# Patient Record
Sex: FEMALE | Race: WHITE | NOT HISPANIC OR LATINO | Employment: UNEMPLOYED | URBAN - METROPOLITAN AREA
[De-identification: names, ages, dates, MRNs, and addresses within clinical notes are randomized per-mention and may not be internally consistent; named-entity substitution may affect disease eponyms.]

---

## 2017-05-02 ENCOUNTER — INITIAL PRENATAL (OUTPATIENT)
Dept: OBSTETRICS AND GYNECOLOGY | Facility: CLINIC | Age: 22
End: 2017-05-02

## 2017-05-02 ENCOUNTER — LAB (OUTPATIENT)
Dept: LAB | Facility: HOSPITAL | Age: 22
End: 2017-05-02

## 2017-05-02 VITALS — WEIGHT: 143 LBS

## 2017-05-02 DIAGNOSIS — Z3A.01 LESS THAN 8 WEEKS GESTATION OF PREGNANCY: Primary | ICD-10-CM

## 2017-05-02 DIAGNOSIS — Z3A.01 LESS THAN 8 WEEKS GESTATION OF PREGNANCY: ICD-10-CM

## 2017-05-02 LAB
ABO GROUP BLD: NORMAL
BASOPHILS # BLD AUTO: 0.05 10*3/MM3 (ref 0–0.2)
BASOPHILS NFR BLD AUTO: 0.5 % (ref 0–1)
BLD GP AB SCN SERPL QL: NEGATIVE
DEPRECATED RDW RBC AUTO: 43.4 FL (ref 37–54)
EOSINOPHIL # BLD AUTO: 0.14 10*3/MM3 (ref 0.1–0.3)
EOSINOPHIL NFR BLD AUTO: 1.3 % (ref 0–3)
ERYTHROCYTE [DISTWIDTH] IN BLOOD BY AUTOMATED COUNT: 13 % (ref 11.3–14.5)
EXTERNAL HEPATITIS B SURFACE ANTIGEN: NEGATIVE
EXTERNAL RUBELLA QUALITATIVE: NORMAL
EXTERNAL SYPHILIS RPR SCREEN: NORMAL
HBV SURFACE AG SERPL QL IA: NORMAL
HCT VFR BLD AUTO: 38.1 % (ref 34.5–44)
HGB BLD-MCNC: 13.1 G/DL (ref 11.5–15.5)
HIV1 P24 AG SERPL QL IA: NEGATIVE
HIV1+2 AB SER QL: NORMAL
IMM GRANULOCYTES # BLD: 0.03 10*3/MM3 (ref 0–0.03)
IMM GRANULOCYTES NFR BLD: 0.3 % (ref 0–0.6)
LYMPHOCYTES # BLD AUTO: 2.23 10*3/MM3 (ref 0.6–4.8)
LYMPHOCYTES NFR BLD AUTO: 20.6 % (ref 24–44)
MCH RBC QN AUTO: 31.6 PG (ref 27–31)
MCHC RBC AUTO-ENTMCNC: 34.4 G/DL (ref 32–36)
MCV RBC AUTO: 92 FL (ref 80–99)
MONOCYTES # BLD AUTO: 0.61 10*3/MM3 (ref 0–1)
MONOCYTES NFR BLD AUTO: 5.6 % (ref 0–12)
NEUTROPHILS # BLD AUTO: 7.74 10*3/MM3 (ref 1.5–8.3)
NEUTROPHILS NFR BLD AUTO: 71.7 % (ref 41–71)
PLATELET # BLD AUTO: 358 10*3/MM3 (ref 150–450)
PMV BLD AUTO: 9.2 FL (ref 6–12)
RBC # BLD AUTO: 4.14 10*6/MM3 (ref 3.89–5.14)
RH BLD: POSITIVE
RUBV IGG SER QL: NORMAL
RUBV IGG SER-ACNC: 123.9 IU/ML
WBC NRBC COR # BLD: 10.8 10*3/MM3 (ref 4.5–13.5)

## 2017-05-02 PROCEDURE — 36415 COLL VENOUS BLD VENIPUNCTURE: CPT

## 2017-05-02 PROCEDURE — 99204 OFFICE O/P NEW MOD 45 MIN: CPT | Performed by: OBSTETRICS & GYNECOLOGY

## 2017-05-02 PROCEDURE — 80081 OBSTETRIC PANEL INC HIV TSTG: CPT | Performed by: OBSTETRICS & GYNECOLOGY

## 2017-05-02 RX ORDER — PROMETHAZINE HYDROCHLORIDE 25 MG/1
25 SUPPOSITORY RECTAL EVERY 6 HOURS PRN
Qty: 12 SUPPOSITORY | Refills: 1 | Status: SHIPPED | OUTPATIENT
Start: 2017-05-02 | End: 2017-10-23

## 2017-05-03 LAB — RPR SER QL: NORMAL

## 2017-06-16 ENCOUNTER — ROUTINE PRENATAL (OUTPATIENT)
Dept: OBSTETRICS AND GYNECOLOGY | Facility: CLINIC | Age: 22
End: 2017-06-16

## 2017-06-16 VITALS — DIASTOLIC BLOOD PRESSURE: 60 MMHG | SYSTOLIC BLOOD PRESSURE: 100 MMHG | WEIGHT: 143.4 LBS

## 2017-06-16 DIAGNOSIS — Z3A.13 13 WEEKS GESTATION OF PREGNANCY: Primary | ICD-10-CM

## 2017-06-16 PROCEDURE — 99213 OFFICE O/P EST LOW 20 MIN: CPT | Performed by: OBSTETRICS & GYNECOLOGY

## 2017-06-16 RX ORDER — VALACYCLOVIR HYDROCHLORIDE 1 G/1
TABLET, FILM COATED ORAL
Refills: 0 | COMMUNITY
Start: 2017-05-10 | End: 2022-05-25

## 2017-06-16 NOTE — PROGRESS NOTES
Chief Complaint   Patient presents with   • Routine Prenatal Visit     No complaints       HPI: Glen is a  currently at 13w2d who today reports the following:  Nausea - No; Vaginal bleeding -  No; Heartburn - No.    ROS:  GI: Constipation - No; Diarrhea - No    Neuro: Headache - No; Visual change - No      EXAM:  Vitals: See prenatal flowsheet   Abdomen: See prenatal flowsheet   Urine glucose/protein: See prenatal flowsheet   Pelvic: See prenatal flowsheet     Prenatal Labs  Lab Results   Component Value Date    HGB 13.1 2017    RUBELLAIGGIN Immune 2017    HEPBSAG Non-Reactive 2017    ABO AB 2017    RH Positive 2017    ABSCRN Negative 2017    [id number] Non-Reactive 2017       MDM:  Impression: 1. Supervision of pregnancy   Tests done today: 1. none   Topics discussed: 1. Continue with PNV's  2. Prenatal labs reviewed  3. none - she had no major complaints,questions or concerns   Tests next visit: none   Next visit: See prenatal flowsheet

## 2017-06-27 RX ORDER — AZITHROMYCIN 500 MG/1
1000 TABLET, FILM COATED ORAL ONCE
Qty: 2 TABLET | Refills: 0 | Status: SHIPPED | OUTPATIENT
Start: 2017-06-27 | End: 2017-06-27

## 2017-06-27 RX ORDER — ONDANSETRON 4 MG/1
4 TABLET, ORALLY DISINTEGRATING ORAL EVERY 6 HOURS PRN
Qty: 20 TABLET | Refills: 2 | Status: SHIPPED | OUTPATIENT
Start: 2017-06-27 | End: 2017-10-23

## 2017-07-21 ENCOUNTER — ROUTINE PRENATAL (OUTPATIENT)
Dept: OBSTETRICS AND GYNECOLOGY | Facility: CLINIC | Age: 22
End: 2017-07-21

## 2017-07-21 VITALS — SYSTOLIC BLOOD PRESSURE: 100 MMHG | DIASTOLIC BLOOD PRESSURE: 66 MMHG | WEIGHT: 144 LBS

## 2017-07-21 DIAGNOSIS — Z34.02 ENCOUNTER FOR SUPERVISION OF NORMAL FIRST PREGNANCY IN SECOND TRIMESTER: Primary | ICD-10-CM

## 2017-07-21 PROCEDURE — 99213 OFFICE O/P EST LOW 20 MIN: CPT | Performed by: OBSTETRICS & GYNECOLOGY

## 2017-07-21 RX ORDER — NITROFURANTOIN 25; 75 MG/1; MG/1
100 CAPSULE ORAL 2 TIMES DAILY
COMMUNITY
End: 2017-07-31

## 2017-07-21 NOTE — PROGRESS NOTES
Chief Complaint   Patient presents with   • Pregnancy Problem     Seen in the ER @ [facility] for cramping and body aches was diagnosed with Bladder infection       HPI: Glen is a  currently at 18w2d who today reports the following:  Contractions - No; Leaking - No; Vaginal bleeding -  No; Heartburn - No.    ROS:  GI: Nausea - No ; Constipation - No; Diarrhea - No    Neuro: Headache - No ; Visual change - No      EXAM:  Vitals: See prenatal flowsheet   Abdomen: See prenatal flowsheet   Urine glucose/protein: See prenatal flowsheet   Pelvic: See prenatal flowsheet     Lab Results   Component Value Date    ABO AB 2017    RH Positive 2017    ABSCRN Negative 2017       MDM:  Impression: 1. Supervision of pregnancy   Tests done today: 1. none   Topics discussed: 1. Continue with PNV's  2. Prenatal labs reviewed  3. UTI in pregnancy   Tests next visit: U/S for anatomic screening  Test of cure for chlamydia   Next visit: See prenatal flowsheet

## 2017-07-31 ENCOUNTER — ROUTINE PRENATAL (OUTPATIENT)
Dept: OBSTETRICS AND GYNECOLOGY | Facility: CLINIC | Age: 22
End: 2017-07-31

## 2017-07-31 VITALS — SYSTOLIC BLOOD PRESSURE: 98 MMHG | WEIGHT: 146.8 LBS | DIASTOLIC BLOOD PRESSURE: 68 MMHG

## 2017-07-31 DIAGNOSIS — O98.312 CHLAMYDIA TRACHOMATIS INFECTION DURING PREGNANCY IN SECOND TRIMESTER: Primary | ICD-10-CM

## 2017-07-31 DIAGNOSIS — B37.31 VAGINAL YEAST INFECTION: ICD-10-CM

## 2017-07-31 DIAGNOSIS — A56.8 CHLAMYDIA TRACHOMATIS INFECTION DURING PREGNANCY IN SECOND TRIMESTER: Primary | ICD-10-CM

## 2017-07-31 DIAGNOSIS — Z3A.19 19 WEEKS GESTATION OF PREGNANCY: ICD-10-CM

## 2017-07-31 LAB
EXTERNAL CHLAMYDIA SCREEN: NEGATIVE
EXTERNAL GONORRHEA SCREEN: NEGATIVE

## 2017-07-31 PROCEDURE — 99213 OFFICE O/P EST LOW 20 MIN: CPT | Performed by: OBSTETRICS & GYNECOLOGY

## 2017-07-31 RX ORDER — TERCONAZOLE 80 MG/1
80 SUPPOSITORY VAGINAL NIGHTLY
Qty: 3 SUPPOSITORY | Refills: 1 | Status: SHIPPED | OUTPATIENT
Start: 2017-07-31 | End: 2017-08-28

## 2017-07-31 NOTE — PROGRESS NOTES
Chief Complaint   Patient presents with   • Routine Prenatal Visit     ADEOLA: Chlamydia       HPI: Glen is a  currently at 19w5d who today reports the following:  Contractions - No; Leaking - No; Vaginal bleeding -  No; Heartburn - No.    ROS:  GI: Nausea - No ; Constipation - No; Diarrhea - No    Neuro: Headache - No ; Visual change - No      EXAM:  Vitals: See prenatal flowsheet   Abdomen: See prenatal flowsheet   Urine glucose/protein: See prenatal flowsheet   Pelvic: See prenatal flowsheet     Lab Results   Component Value Date    ABO AB 2017    RH Positive 2017    ABSCRN Negative 2017       MDM:  Impression: 1. Supervision of pregnancy   Tests done today: 1. GC/Chlamydia testing  2. U/S for anatomic screening - anatomy completely seen today   Topics discussed: 1. Continue with PNV's  2. Prenatal labs reviewed  3. none - she had no major complaints,questions or concerns   Tests next visit: none   Next visit: See prenatal flowsheet

## 2017-08-16 ENCOUNTER — TELEPHONE (OUTPATIENT)
Dept: OBSTETRICS AND GYNECOLOGY | Facility: CLINIC | Age: 22
End: 2017-08-16

## 2017-08-16 NOTE — TELEPHONE ENCOUNTER
----- Message from Ofelia Cano sent at 8/16/2017 12:40 PM EDT -----  Patient is needing something for a cold or sinius.  Left message for patient to try OTC meds that are on her OTC med list.

## 2017-08-28 ENCOUNTER — ROUTINE PRENATAL (OUTPATIENT)
Dept: OBSTETRICS AND GYNECOLOGY | Facility: CLINIC | Age: 22
End: 2017-08-28

## 2017-08-28 VITALS — WEIGHT: 151 LBS | DIASTOLIC BLOOD PRESSURE: 64 MMHG | SYSTOLIC BLOOD PRESSURE: 102 MMHG

## 2017-08-28 DIAGNOSIS — Z34.02 ENCOUNTER FOR SUPERVISION OF NORMAL FIRST PREGNANCY IN SECOND TRIMESTER: Primary | ICD-10-CM

## 2017-08-28 PROCEDURE — 99213 OFFICE O/P EST LOW 20 MIN: CPT | Performed by: OBSTETRICS & GYNECOLOGY

## 2017-08-28 NOTE — PROGRESS NOTES
Chief Complaint   Patient presents with   • Routine Prenatal Visit     No complaints       HPI: Glen is a  currently at 23w5d who today reports the following:  Contractions - No; Leaking - No; Vaginal bleeding -  No; Heartburn - No.    ROS:  GI: Nausea - No ; Constipation - No; Diarrhea - No    Neuro: Headache - No ; Visual change - No      EXAM:  Vitals: See prenatal flowsheet   Abdomen: See prenatal flowsheet   Urine glucose/protein: See prenatal flowsheet   Pelvic: See prenatal flowsheet     Lab Results   Component Value Date    ABO AB 2017    RH Positive 2017    ABSCRN Negative 2017       MDM:  Impression: 1. Supervision of pregnancy   Tests done today: 1. none   Topics discussed: 1. Continue with PNV's  2. Prenatal labs reviewed  3. none - she had no major complaints,questions or concerns   Tests next visit: none   Next visit: See prenatal flowsheet

## 2017-09-11 ENCOUNTER — ROUTINE PRENATAL (OUTPATIENT)
Dept: OBSTETRICS AND GYNECOLOGY | Facility: CLINIC | Age: 22
End: 2017-09-11

## 2017-09-11 VITALS — DIASTOLIC BLOOD PRESSURE: 68 MMHG | SYSTOLIC BLOOD PRESSURE: 110 MMHG | WEIGHT: 158 LBS

## 2017-09-11 DIAGNOSIS — Z34.02 ENCOUNTER FOR SUPERVISION OF NORMAL FIRST PREGNANCY IN SECOND TRIMESTER: Primary | ICD-10-CM

## 2017-09-11 PROCEDURE — 99213 OFFICE O/P EST LOW 20 MIN: CPT | Performed by: OBSTETRICS & GYNECOLOGY

## 2017-09-11 NOTE — PROGRESS NOTES
Chief Complaint   Patient presents with   • Routine Prenatal Visit     No complaints       HPI: Glen is a  currently at 25w5d who today reports the following:  Contractions - No; Leaking - No; Vaginal bleeding -  No; Heartburn - No.    ROS:  GI: Nausea - No ; Constipation - No; Diarrhea - No    Neuro: Headache - No ; Visual change - No      EXAM:  Vitals: See prenatal flowsheet   Abdomen: See prenatal flowsheet   Urine glucose/protein: See prenatal flowsheet   Pelvic: See prenatal flowsheet     Lab Results   Component Value Date    ABO AB 2017    RH Positive 2017    ABSCRN Negative 2017       MDM:  Impression: 1. Supervision of pregnancy   Tests done today: 1. none   Topics discussed: 1. Continue with PNV's  2. Prenatal labs reviewed  3. none - she had no major complaints,questions or concerns   Tests next visit: GCT  HgB  ABS   Next visit: See prenatal flowsheet

## 2017-09-25 ENCOUNTER — LAB (OUTPATIENT)
Dept: LAB | Facility: HOSPITAL | Age: 22
End: 2017-09-25

## 2017-09-25 ENCOUNTER — ROUTINE PRENATAL (OUTPATIENT)
Dept: OBSTETRICS AND GYNECOLOGY | Facility: CLINIC | Age: 22
End: 2017-09-25

## 2017-09-25 VITALS — WEIGHT: 162.2 LBS | SYSTOLIC BLOOD PRESSURE: 102 MMHG | DIASTOLIC BLOOD PRESSURE: 72 MMHG

## 2017-09-25 DIAGNOSIS — Z34.02 ENCOUNTER FOR SUPERVISION OF NORMAL FIRST PREGNANCY IN SECOND TRIMESTER: ICD-10-CM

## 2017-09-25 DIAGNOSIS — Z34.03 ENCOUNTER FOR SUPERVISION OF NORMAL FIRST PREGNANCY IN THIRD TRIMESTER: Primary | ICD-10-CM

## 2017-09-25 LAB
BLD GP AB SCN SERPL QL: NEGATIVE
EXTERNAL GTT 1 HOUR: 73
GLUCOSE 1H P 100 G GLC PO SERPL-MCNC: 73 MG/DL (ref 65–199)
GLUCOSE BLDC-MCNC: 75 MG/DL (ref 75–125)
HCT VFR BLD AUTO: 32.7 % (ref 34.5–44)
HGB BLD-MCNC: 11.2 G/DL (ref 11.5–15.5)
TSH SERPL DL<=0.05 MIU/L-ACNC: 1.47 MIU/ML (ref 0.35–5.35)

## 2017-09-25 PROCEDURE — 84443 ASSAY THYROID STIM HORMONE: CPT | Performed by: OBSTETRICS & GYNECOLOGY

## 2017-09-25 PROCEDURE — 36415 COLL VENOUS BLD VENIPUNCTURE: CPT

## 2017-09-25 PROCEDURE — 82962 GLUCOSE BLOOD TEST: CPT | Performed by: OBSTETRICS & GYNECOLOGY

## 2017-09-25 PROCEDURE — 85018 HEMOGLOBIN: CPT | Performed by: OBSTETRICS & GYNECOLOGY

## 2017-09-25 PROCEDURE — 85014 HEMATOCRIT: CPT | Performed by: OBSTETRICS & GYNECOLOGY

## 2017-09-25 PROCEDURE — 82950 GLUCOSE TEST: CPT | Performed by: OBSTETRICS & GYNECOLOGY

## 2017-09-25 PROCEDURE — 99213 OFFICE O/P EST LOW 20 MIN: CPT | Performed by: OBSTETRICS & GYNECOLOGY

## 2017-09-25 PROCEDURE — 86850 RBC ANTIBODY SCREEN: CPT | Performed by: OBSTETRICS & GYNECOLOGY

## 2017-09-25 NOTE — PROGRESS NOTES
Chief Complaint   Patient presents with   • Routine Prenatal Visit     No complaints       HPI: Glen is a  currently at 27w5d who today reports the following:  Contractions - No; Leaking - No; Vaginal bleeding -  No; Heartburn - No.    ROS:  GI: Nausea - No ; Constipation - No; Diarrhea - No    Neuro: Headache - No ; Visual change - No      EXAM:  Vitals: See prenatal flowsheet   Abdomen: See prenatal flowsheet   Urine glucose/protein: See prenatal flowsheet   Pelvic: See prenatal flowsheet     Lab Results   Component Value Date    ABO AB 2017    RH Positive 2017    ABSCRN Negative 2017       MDM:  Impression: 1. Supervision of low risk pregnancy   Tests done today: 1. antibody screen  2. GCT  3. HgB  4. TSH   Topics discussed: 1. Continue with PNV's  2. Prenatal labs reviewed  3. none - she had no major complaints,questions or concerns   Tests next visit: none   Next visit: See prenatal flowsheet

## 2017-10-09 ENCOUNTER — ROUTINE PRENATAL (OUTPATIENT)
Dept: OBSTETRICS AND GYNECOLOGY | Facility: CLINIC | Age: 22
End: 2017-10-09

## 2017-10-09 VITALS — SYSTOLIC BLOOD PRESSURE: 100 MMHG | DIASTOLIC BLOOD PRESSURE: 74 MMHG | WEIGHT: 154.6 LBS

## 2017-10-09 DIAGNOSIS — Z34.03 ENCOUNTER FOR SUPERVISION OF NORMAL FIRST PREGNANCY IN THIRD TRIMESTER: Primary | ICD-10-CM

## 2017-10-09 PROCEDURE — 99213 OFFICE O/P EST LOW 20 MIN: CPT | Performed by: OBSTETRICS & GYNECOLOGY

## 2017-10-14 ENCOUNTER — HOSPITAL ENCOUNTER (OUTPATIENT)
Facility: HOSPITAL | Age: 22
Setting detail: OBSERVATION
Discharge: HOME | End: 2017-10-14
Attending: OBSTETRICS & GYNECOLOGY | Admitting: OBSTETRICS & GYNECOLOGY

## 2017-10-14 VITALS
BODY MASS INDEX: 25.66 KG/M2 | HEART RATE: 88 BPM | HEIGHT: 65 IN | DIASTOLIC BLOOD PRESSURE: 68 MMHG | SYSTOLIC BLOOD PRESSURE: 113 MMHG | TEMPERATURE: 98.2 F | RESPIRATION RATE: 16 BRPM | WEIGHT: 154 LBS

## 2017-10-14 PROBLEM — Z34.90 PREGNANCY: Status: ACTIVE | Noted: 2017-10-14

## 2017-10-14 LAB
BACTERIA UR QL AUTO: ABNORMAL /HPF
BILIRUB UR QL STRIP: NEGATIVE
CLARITY UR: ABNORMAL
COLOR UR: YELLOW
DEPRECATED RDW RBC AUTO: 40.7 FL (ref 37–54)
ERYTHROCYTE [DISTWIDTH] IN BLOOD BY AUTOMATED COUNT: 12.1 % (ref 11.3–14.5)
GLUCOSE UR STRIP-MCNC: NEGATIVE MG/DL
HCT VFR BLD AUTO: 32.1 % (ref 34.5–44)
HGB BLD-MCNC: 11.5 G/DL (ref 11.5–15.5)
HGB UR QL STRIP.AUTO: NEGATIVE
HYALINE CASTS UR QL AUTO: ABNORMAL /LPF
KETONES UR QL STRIP: ABNORMAL
LEUKOCYTE ESTERASE UR QL STRIP.AUTO: ABNORMAL
MCH RBC QN AUTO: 32.9 PG (ref 27–31)
MCHC RBC AUTO-ENTMCNC: 35.8 G/DL (ref 32–36)
MCV RBC AUTO: 91.7 FL (ref 80–99)
MUCOUS THREADS URNS QL MICRO: ABNORMAL /HPF
NITRITE UR QL STRIP: NEGATIVE
PH UR STRIP.AUTO: 6.5 [PH] (ref 5–8)
PLATELET # BLD AUTO: 297 10*3/MM3 (ref 150–450)
PMV BLD AUTO: 9.5 FL (ref 6–12)
PROT UR QL STRIP: NEGATIVE
RBC # BLD AUTO: 3.5 10*6/MM3 (ref 3.89–5.14)
RBC # UR: ABNORMAL /HPF
REF LAB TEST METHOD: ABNORMAL
SP GR UR STRIP: 1.01 (ref 1–1.03)
SQUAMOUS #/AREA URNS HPF: ABNORMAL /HPF
UROBILINOGEN UR QL STRIP: ABNORMAL
WBC NRBC COR # BLD: 11.76 10*3/MM3 (ref 4.5–13.5)
WBC UR QL AUTO: ABNORMAL /HPF

## 2017-10-14 PROCEDURE — 85027 COMPLETE CBC AUTOMATED: CPT | Performed by: OBSTETRICS & GYNECOLOGY

## 2017-10-14 PROCEDURE — 99218 PR INITIAL OBSERVATION CARE/DAY 30 MINUTES: CPT | Performed by: OBSTETRICS & GYNECOLOGY

## 2017-10-14 PROCEDURE — 59025 FETAL NON-STRESS TEST: CPT | Performed by: OBSTETRICS & GYNECOLOGY

## 2017-10-14 PROCEDURE — 81001 URINALYSIS AUTO W/SCOPE: CPT | Performed by: OBSTETRICS & GYNECOLOGY

## 2017-10-14 PROCEDURE — G0378 HOSPITAL OBSERVATION PER HR: HCPCS

## 2017-10-14 PROCEDURE — 59025 FETAL NON-STRESS TEST: CPT

## 2017-10-14 PROCEDURE — 87086 URINE CULTURE/COLONY COUNT: CPT | Performed by: OBSTETRICS & GYNECOLOGY

## 2017-10-14 NOTE — PROGRESS NOTES
Demetria  Obstetric History and Physical    No chief complaint on file.      Subjective     Patient is a 22 y.o. female  currently at 30w3d, who presents with c/o dizziness.  She states had transient episode of dizziness while doing housework and  without associated shortness of breath or chest pain.  Denied leaking of fluid, vaginal bleeding, contractions, reports normal fetal activity.  Patient denies any other associated symptoms.  Patient's pre-and course has been uncomplicated with Dr. Romero.    none.  Her previous obstetric/gynecological history is noted for is remarkable for .    The following portions of the patients history were reviewed and updated as appropriate: current medications, allergies, past medical history, past surgical history, past family history, past social history and problem list .       Prenatal Information:   Maternal Prenatal Labs  Blood Type No results found for: ABO   Rh Status No results found for: RH   Antibody Screen No results found for: ABSCRN   Gonnorhea No results found for: GCCX   Chlamydia No results found for: CLAMYDCU   RPR No results found for: RPR   Syphilis Antibody No results found for: SYPHILIS   Rubella No results found for: RUBELLAIGGIN   Hepatitis B Surface Antigen No results found for: HEPBSAG   HIV-1 Antibody No results found for: LABHIV1   Hepatitis C Antibody No results found for: HEPCAB   Rapid Urin Drug Screen No results found for: AMPMETHU, BARBITSCNUR, LABBENZSCN, LABMETHSCN, LABOPIASCN, THCURSCR, COCAINEUR, AMPHETSCREEN, PROPOXSCN, BUPRENORSCNU, METAMPSCNUR, OXYCODONESCN, TRICYCLICSCN   Group B Strep Culture No results found for: GBSANTIGEN                 Past OB History:       Obstetric History       T0      L0     SAB0   TAB0   Ectopic0   Multiple0   Live Births0       # Outcome Date GA Lbr Kyle/2nd Weight Sex Delivery Anes PTL Lv   1 Current                   Past Medical History: No past medical history on file.   Past Surgical  History Past Surgical History:   Procedure Laterality Date   • APPENDECTOMY  2005      Family History: Family History   Problem Relation Age of Onset   • Hypertension Mother    • Hypertension Father    • Heart disease Maternal Grandmother    • Diabetes Maternal Grandmother    • Heart disease Maternal Grandfather    • Hypertension Paternal Grandmother    • Hypertension Paternal Grandfather       Social History:  reports that she has never smoked. She has never used smokeless tobacco.   reports that she does not drink alcohol.   reports that she does not use illicit drugs.                   General ROS Negative Findings:Headaches, Visual Changes, Epigastric pain, Anorexia, Nausia/Vomiting, ROM and Vaginal Bleeding    Review of Systems   Constitution: Positive for fever. Negative for decreased appetite.   Eyes: Negative for blurred vision.   Cardiovascular: Negative for dyspnea on exertion and leg swelling.   Genitourinary: Negative for dysuria.         Objective       Vital Signs Range for the last 24 hours  Temperature: Temp:  [98 °F (36.7 °C)-98.2 °F (36.8 °C)] 98.2 °F (36.8 °C)   Temp Source: Temp src: Oral   BP: BP: (110-113)/(66-68) 113/68   Pulse: Heart Rate:  [88-90] 88   Respirations: Resp:  [16] 16   SPO2:     O2 Amount (l/min):     O2 Devices     Weight: Weight:  [69.9 kg (154 lb)] 69.9 kg (154 lb)     Physical Examination:   General:   alert, appears stated age and cooperative   Skin:   normal   HEENT:     Lungs:   clear to auscultation bilaterally   Heart:   regular rate and rhythm, S1, S2 normal, no murmur, click, rub or gallop   Abdomen:  soft,Gravid uterus nontender, no guarding, rebound, negative CVA tenderness.     Lower Extremeties    no edema, no calf tenderness, DTRs 2+ over 4 no clonus    Pelvis:  Exam deferred.         Presentation:    Cervix: Exam by:     Dilation:     Effacement:     Station:         Fetal Heart Rate Assessment   Method: Fetal HR Assessment Method: external, other (see  comments) (poor tracing, US adjusted)   Beats/min: Fetal HR (Beats/Min): 150   Baseline: Fetal HR Baseline: normal range (110-160 bpm)   Varibility: Fetal HR Variability: moderate (amplitude range 6 to 25 bpm)   Accels: Fetal HR Accelerations: greater than/equal to 15 bpm, lasting at least 15 seconds   Decels: Fetal HR Decelerations: other (see comments) (poor tracing, US adjusted)   Tracing Category:     NST ind dizziness, reactive, mod v, onset 1340  Offset 1515  +accel 15x15, occ variable decel, no ctx  Uterine Assessment   Method: Method: TOCO (external toco transducer)   Frequency (min):     Ctx Count in 10 min:     Duration:     Intensity: Contraction Intensity: no contractions   Intensity by IUPC:     Resting Tone:     Resting Tone by IUPC:     Kasbeer Units:       Laboratory Results: @vnajgecs75@ UA dirty catch, CBC WNL  Radiology Review:@lastrad@  Other Studies:    Assessment/Plan     Active Problems:    Pregnancy        Assessment:  1.  Intrauterine pregnancy at 30w3d weeks gestation with reactive fetal status.    2.  Transient episode of dizziness  3.  Normal exam  4.  No evidence of labor or rupture membranes    Plan:  1. D/C to home.f/u ob routine or prn, instructions given  2. Plan of care has been reviewed with patient.  3.  Risks, benefits of treatment plan have been discussed.  4.  All questions have been answered.  5      Nick Beltre DO  10/14/2017  4:53 PM

## 2017-10-16 LAB — BACTERIA SPEC AEROBE CULT: NORMAL

## 2017-10-23 ENCOUNTER — ROUTINE PRENATAL (OUTPATIENT)
Dept: OBSTETRICS AND GYNECOLOGY | Facility: CLINIC | Age: 22
End: 2017-10-23

## 2017-10-23 VITALS — BODY MASS INDEX: 26.63 KG/M2 | DIASTOLIC BLOOD PRESSURE: 66 MMHG | WEIGHT: 160 LBS | SYSTOLIC BLOOD PRESSURE: 100 MMHG

## 2017-10-23 DIAGNOSIS — Z34.03 ENCOUNTER FOR SUPERVISION OF NORMAL FIRST PREGNANCY IN THIRD TRIMESTER: Primary | ICD-10-CM

## 2017-10-23 PROCEDURE — 99213 OFFICE O/P EST LOW 20 MIN: CPT | Performed by: OBSTETRICS & GYNECOLOGY

## 2017-10-23 NOTE — PROGRESS NOTES
Chief Complaint   Patient presents with   • Routine Prenatal Visit     No complaints       HPI: Glen is a  currently at 31w5d who today reports the following:  Contractions - No; Leaking - No; Vaginal bleeding -  No; Swelling of extremities - No.    ROS:  GI: Nausea - No; Constipation - No; Diarrhea - No    Neuro: Headache - No; Visual change - No      EXAM:  Vitals: See prenatal flowsheet   Abdomen: See prenatal flowsheet   Urine glucose/protein: See prenatal flowsheet   Pelvic: See prenatal flowsheet   MDM:  Impression: 1. Supervision of low risk pregnancy   Tests done today: 1. none   Topics discussed: 1. Continue with PNV's  2. Prenatal labs reviewed  3. none - she had no major complaints,questions or concerns   Tests next visit: none   Next visit: See prenatal flowsheet

## 2017-11-06 ENCOUNTER — ROUTINE PRENATAL (OUTPATIENT)
Dept: OBSTETRICS AND GYNECOLOGY | Facility: CLINIC | Age: 22
End: 2017-11-06

## 2017-11-06 VITALS — BODY MASS INDEX: 26.56 KG/M2 | SYSTOLIC BLOOD PRESSURE: 118 MMHG | DIASTOLIC BLOOD PRESSURE: 74 MMHG | WEIGHT: 159.6 LBS

## 2017-11-06 DIAGNOSIS — Z34.03 ENCOUNTER FOR SUPERVISION OF NORMAL FIRST PREGNANCY IN THIRD TRIMESTER: Primary | ICD-10-CM

## 2017-11-06 PROCEDURE — 99213 OFFICE O/P EST LOW 20 MIN: CPT | Performed by: OBSTETRICS & GYNECOLOGY

## 2017-11-06 NOTE — PROGRESS NOTES
Chief Complaint   Patient presents with   • Routine Prenatal Visit     No complaints       HPI: Glen is a  currently at 33w5d who today reports the following:  Contractions - No; Leaking - No; Vaginal bleeding -  No; Swelling of extremities - No.    ROS:  GI: Nausea - No; Constipation - No; Diarrhea - No    Neuro: Headache - No; Visual change - No      EXAM:  Vitals: See prenatal flowsheet   Abdomen: See prenatal flowsheet   Urine glucose/protein: See prenatal flowsheet   Pelvic: See prenatal flowsheet   MDM:  Impression: 1. Supervision of low risk pregnancy   Tests done today: 1. none   Topics discussed: 1. Continue with PNV's  2. Prenatal labs reviewed  3. none - she had no major complaints,questions or concerns   Tests next visit: GBS testing  U/S for EFW   Next visit: See prenatal flowsheet

## 2017-11-20 ENCOUNTER — ROUTINE PRENATAL (OUTPATIENT)
Dept: OBSTETRICS AND GYNECOLOGY | Facility: CLINIC | Age: 22
End: 2017-11-20

## 2017-11-20 VITALS — BODY MASS INDEX: 27.17 KG/M2 | SYSTOLIC BLOOD PRESSURE: 104 MMHG | DIASTOLIC BLOOD PRESSURE: 80 MMHG | WEIGHT: 163.3 LBS

## 2017-11-20 DIAGNOSIS — Z3A.35 35 WEEKS GESTATION OF PREGNANCY: Primary | ICD-10-CM

## 2017-11-20 LAB — EXTERNAL GROUP B STREP ANTIGEN: NORMAL

## 2017-11-20 PROCEDURE — 99213 OFFICE O/P EST LOW 20 MIN: CPT | Performed by: OBSTETRICS & GYNECOLOGY

## 2017-11-20 NOTE — PROGRESS NOTES
Chief Complaint   Patient presents with   • Routine Prenatal Visit     No complaints       HPI: Glen is a  currently at 35w5d who today reports the following:  Contractions - No; Leaking - No; Vaginal bleeding -  No; Swelling of extremities - No.    ROS:  GI: Nausea - No; Constipation - No; Diarrhea - No    Neuro: Headache - No; Visual change - No      EXAM:  Vitals: See prenatal flowsheet   Abdomen: See prenatal flowsheet   Urine glucose/protein: See prenatal flowsheet   Pelvic: See prenatal flowsheet   MDM:  Impression: 1. Supervision of low risk pregnancy   Tests done today: 1. GBS testing  2. U/S for efw   Topics discussed: 1. Continue with PNV's  2. Prenatal labs reviewed  3. none - she had no major complaints,questions or concerns   Tests next visit: none   Next visit: See prenatal flowsheet

## 2017-11-23 ENCOUNTER — HOSPITAL ENCOUNTER (OUTPATIENT)
Facility: HOSPITAL | Age: 22
Discharge: HOME | End: 2017-11-23
Attending: OBSTETRICS & GYNECOLOGY | Admitting: OBSTETRICS & GYNECOLOGY

## 2017-11-23 VITALS
HEART RATE: 114 BPM | TEMPERATURE: 97.5 F | RESPIRATION RATE: 16 BRPM | HEIGHT: 65 IN | WEIGHT: 163 LBS | SYSTOLIC BLOOD PRESSURE: 122 MMHG | BODY MASS INDEX: 27.16 KG/M2 | DIASTOLIC BLOOD PRESSURE: 75 MMHG

## 2017-11-23 PROCEDURE — 99214 OFFICE O/P EST MOD 30 MIN: CPT | Performed by: OBSTETRICS & GYNECOLOGY

## 2017-11-23 PROCEDURE — 59025 FETAL NON-STRESS TEST: CPT

## 2017-11-23 PROCEDURE — G0463 HOSPITAL OUTPT CLINIC VISIT: HCPCS

## 2017-11-23 NOTE — PROGRESS NOTES
"[facility]  Glen Bey  : 1995  MRN: [id number]  CSN: [id number]    History and Physical    Subjective   Glen Bey is a 22 y.o. year old  with an Estimated Date of Delivery: 17 currently at 36w1d presenting with leaking since last pm she had a gush of fluid and again this am - more and watery..    Nursing did nitrazine which was negative..        Prenatal care has been with Dr. Dr. Romero.  It has been benign.    Obstetric History       T0      L0     SAB0   TAB0   Ectopic0   Multiple0   Live Births0       # Outcome Date GA Lbr Kyle/2nd Weight Sex Delivery Anes PTL Lv   1 Current                 Past Medical History:   Diagnosis Date   • Chlamydia     treated for chlamydia in beginning of this pregnancy     Past Surgical History:   Procedure Laterality Date   • APPENDECTOMY       No current facility-administered medications for this encounter.     No Known Allergies  Smoking status: Never Smoker                                                              Smokeless status: Never Used                        Review of Systems      Objective   /75  Pulse 114  Temp 97.5 °F (36.4 °C) (Oral)   Resp 16  Ht 65\" (165.1 cm)  Wt 163 lb (73.9 kg)  LMP 2017 (Exact Date)  BMI 27.12 kg/m2  General: well developed; well nourished  no acute distress  appears stated age  not distressed   Abdomen: no umbilical or inginual hernias are present  no hepato-splenomegaly  gravid   FHT's: reactive and category 1  external monitors used  STERILE SPECULUM: negative valsalva; pH 4.0; ferning negative; slight inc amount of off white discharge   Cervix: was checked (by me): 2 cm / 50 % / -3   Presentation: cephalic   Contractions: none   Back: Not performed today     Prenatal Labs  Lab Results   Component Value Date    HGB 11.5 10/14/2017    HEPBSAG Non-Reactive 2017    ABO AB 2017    RH Positive 2017    ABSCRN Negative 2017    [id number] Non-Reactive 2017 "    URINECX No growth at 2 days 10/14/2017       Current Labs Reviewed   No data reviewed       Assessment   1. IUP at 36w1d  2. False labor  3. Negative sterile speculum exam       Plan   1. Keep scheduled follow-up with Dr. Dr. Romero as previously scheduled.  2. Return if contractions are painful and every 5-7 minutes for one hour OR ruptured membranes.      Craig Howard MD  11/23/2017  11:50 AM

## 2017-11-23 NOTE — NURSING NOTE
D/C instructions provided to pt on when to return to hospital such as vaginal bleeding more than a period (informed pt she may have spotting after SVE), contractions, decreased fetal movement, or if she thinks she is leaking fluid. Keep already scheduled appt. Patient has no other questions.

## 2017-11-27 ENCOUNTER — ROUTINE PRENATAL (OUTPATIENT)
Dept: OBSTETRICS AND GYNECOLOGY | Facility: CLINIC | Age: 22
End: 2017-11-27

## 2017-11-27 VITALS — DIASTOLIC BLOOD PRESSURE: 80 MMHG | SYSTOLIC BLOOD PRESSURE: 118 MMHG | BODY MASS INDEX: 27.49 KG/M2 | WEIGHT: 165.2 LBS

## 2017-11-27 DIAGNOSIS — Z34.03 ENCOUNTER FOR SUPERVISION OF NORMAL FIRST PREGNANCY IN THIRD TRIMESTER: Primary | ICD-10-CM

## 2017-11-27 PROCEDURE — 99213 OFFICE O/P EST LOW 20 MIN: CPT | Performed by: OBSTETRICS & GYNECOLOGY

## 2017-11-27 NOTE — PROGRESS NOTES
Chief Complaint   Patient presents with   • Routine Prenatal Visit     No complaints       HPI: Glen is a  currently at 36w5d who today reports the following:  Contractions - No; Leaking - No; Vaginal bleeding -  No; Swelling of extremities - No.    ROS:  GI: Nausea - No; Constipation - No; Diarrhea - No    Neuro: Headache - No; Visual change - No      EXAM:  Vitals: See prenatal flowsheet   Abdomen: See prenatal flowsheet   Urine glucose/protein: See prenatal flowsheet   Pelvic: See prenatal flowsheet   MDM:  Impression: 1. Supervision of low risk pregnancy   Tests done today: 1. none   Topics discussed: 1. Continue with PNV's  2. Prenatal labs reviewed  3. none - she had no major complaints,questions or concerns   Tests next visit: none   Next visit: See prenatal flowsheet

## 2017-12-01 ENCOUNTER — TELEPHONE (OUTPATIENT)
Dept: OBSTETRICS AND GYNECOLOGY | Facility: CLINIC | Age: 22
End: 2017-12-01

## 2017-12-01 NOTE — TELEPHONE ENCOUNTER
I talked with Robert regarding the patient's request.  She has an appointment on Monday, and Robert recommends that Glen discuss her situation with Dr. Romero at that time.  I attempted to contact Glen with this information.  No answer, I left a message asking her to return my call.

## 2017-12-01 NOTE — TELEPHONE ENCOUNTER
Patient called needing a letter stating she is pregnant, her due date, and also stating she is to be on maternity leave. 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 She says the standing all day makes her feel exhausted, and she cannot do it.    Fax letter to [phone]

## 2017-12-02 ENCOUNTER — HOSPITAL ENCOUNTER (OUTPATIENT)
Facility: HOSPITAL | Age: 22
Discharge: HOME | End: 2017-12-02
Attending: OBSTETRICS & GYNECOLOGY | Admitting: OBSTETRICS & GYNECOLOGY

## 2017-12-02 VITALS
WEIGHT: 163 LBS | SYSTOLIC BLOOD PRESSURE: 118 MMHG | RESPIRATION RATE: 16 BRPM | DIASTOLIC BLOOD PRESSURE: 73 MMHG | BODY MASS INDEX: 26.2 KG/M2 | HEART RATE: 108 BPM | TEMPERATURE: 97.9 F | HEIGHT: 66 IN

## 2017-12-02 LAB
ALBUMIN SERPL-MCNC: 3.9 G/DL (ref 3.2–4.8)
ALBUMIN/GLOB SERPL: 1.4 G/DL (ref 1.5–2.5)
ALP SERPL-CCNC: 148 U/L (ref 25–100)
ALT SERPL W P-5'-P-CCNC: 12 U/L (ref 7–40)
AMPHET+METHAMPHET UR QL: NEGATIVE
AMPHETAMINES UR QL: NEGATIVE
ANION GAP SERPL CALCULATED.3IONS-SCNC: 10 MMOL/L (ref 3–11)
AST SERPL-CCNC: 16 U/L (ref 0–33)
BARBITURATES UR QL SCN: NEGATIVE
BENZODIAZ UR QL SCN: NEGATIVE
BILIRUB SERPL-MCNC: 0.8 MG/DL (ref 0.3–1.2)
BILIRUB UR QL STRIP: ABNORMAL
BUN BLD-MCNC: 12 MG/DL (ref 9–23)
BUN/CREAT SERPL: 24 (ref 7–25)
BUPRENORPHINE SERPL-MCNC: NEGATIVE NG/ML
CALCIUM SPEC-SCNC: 9.2 MG/DL (ref 8.7–10.4)
CANNABINOIDS SERPL QL: NEGATIVE
CHLORIDE SERPL-SCNC: 103 MMOL/L (ref 99–109)
CLARITY UR: CLEAR
CO2 SERPL-SCNC: 19 MMOL/L (ref 20–31)
COCAINE UR QL: NEGATIVE
COLOR UR: YELLOW
CREAT BLD-MCNC: 0.5 MG/DL (ref 0.6–1.3)
DEPRECATED RDW RBC AUTO: 39.4 FL (ref 37–54)
ERYTHROCYTE [DISTWIDTH] IN BLOOD BY AUTOMATED COUNT: 12.3 % (ref 11.3–14.5)
GFR SERPL CREATININE-BSD FRML MDRD: >150 ML/MIN/1.73
GLOBULIN UR ELPH-MCNC: 2.7 GM/DL
GLUCOSE BLD-MCNC: 89 MG/DL (ref 70–100)
GLUCOSE UR STRIP-MCNC: NEGATIVE MG/DL
HCT VFR BLD AUTO: 34.2 % (ref 34.5–44)
HGB BLD-MCNC: 11.7 G/DL (ref 11.5–15.5)
HGB UR QL STRIP.AUTO: NEGATIVE
KETONES UR QL STRIP: ABNORMAL
LEUKOCYTE ESTERASE UR QL STRIP.AUTO: ABNORMAL
MCH RBC QN AUTO: 30.5 PG (ref 27–31)
MCHC RBC AUTO-ENTMCNC: 34.2 G/DL (ref 32–36)
MCV RBC AUTO: 89.1 FL (ref 80–99)
METHADONE UR QL SCN: NEGATIVE
NITRITE UR QL STRIP: NEGATIVE
OPIATES UR QL: NEGATIVE
OXYCODONE UR QL SCN: NEGATIVE
PCP UR QL SCN: NEGATIVE
PH UR STRIP.AUTO: 6 [PH] (ref 5–8)
PLATELET # BLD AUTO: 296 10*3/MM3 (ref 150–450)
PMV BLD AUTO: 10.3 FL (ref 6–12)
POTASSIUM BLD-SCNC: 3.6 MMOL/L (ref 3.5–5.5)
PROPOXYPH UR QL: NEGATIVE
PROT SERPL-MCNC: 6.6 G/DL (ref 5.7–8.2)
PROT UR QL STRIP: ABNORMAL
RBC # BLD AUTO: 3.84 10*6/MM3 (ref 3.89–5.14)
SODIUM BLD-SCNC: 132 MMOL/L (ref 132–146)
SP GR UR STRIP: 1.02 (ref 1–1.03)
TRICYCLICS UR QL SCN: NEGATIVE
UROBILINOGEN UR QL STRIP: ABNORMAL
WBC NRBC COR # BLD: 15.59 10*3/MM3 (ref 4.5–13.5)

## 2017-12-02 PROCEDURE — 99214 OFFICE O/P EST MOD 30 MIN: CPT | Performed by: OBSTETRICS & GYNECOLOGY

## 2017-12-02 PROCEDURE — G0463 HOSPITAL OUTPT CLINIC VISIT: HCPCS

## 2017-12-02 PROCEDURE — 59025 FETAL NON-STRESS TEST: CPT

## 2017-12-02 PROCEDURE — 96374 THER/PROPH/DIAG INJ IV PUSH: CPT

## 2017-12-02 PROCEDURE — 96361 HYDRATE IV INFUSION ADD-ON: CPT

## 2017-12-02 PROCEDURE — 80053 COMPREHEN METABOLIC PANEL: CPT | Performed by: OBSTETRICS & GYNECOLOGY

## 2017-12-02 PROCEDURE — 85027 COMPLETE CBC AUTOMATED: CPT | Performed by: OBSTETRICS & GYNECOLOGY

## 2017-12-02 PROCEDURE — 80306 DRUG TEST PRSMV INSTRMNT: CPT | Performed by: OBSTETRICS & GYNECOLOGY

## 2017-12-02 PROCEDURE — 81003 URINALYSIS AUTO W/O SCOPE: CPT | Performed by: OBSTETRICS & GYNECOLOGY

## 2017-12-02 PROCEDURE — 25010000002 ONDANSETRON PER 1 MG: Performed by: OBSTETRICS & GYNECOLOGY

## 2017-12-02 PROCEDURE — 87086 URINE CULTURE/COLONY COUNT: CPT | Performed by: OBSTETRICS & GYNECOLOGY

## 2017-12-02 RX ORDER — SODIUM CHLORIDE, SODIUM LACTATE, POTASSIUM CHLORIDE, CALCIUM CHLORIDE 600; 310; 30; 20 MG/100ML; MG/100ML; MG/100ML; MG/100ML
125 INJECTION, SOLUTION INTRAVENOUS CONTINUOUS
Status: DISCONTINUED | OUTPATIENT
Start: 2017-12-02 | End: 2017-12-02 | Stop reason: HOSPADM

## 2017-12-02 RX ORDER — DEXTROSE, SODIUM CHLORIDE, SODIUM LACTATE, POTASSIUM CHLORIDE, AND CALCIUM CHLORIDE 5; .6; .31; .03; .02 G/100ML; G/100ML; G/100ML; G/100ML; G/100ML
125 INJECTION, SOLUTION INTRAVENOUS CONTINUOUS
Status: DISCONTINUED | OUTPATIENT
Start: 2017-12-02 | End: 2017-12-02 | Stop reason: HOSPADM

## 2017-12-02 RX ORDER — SODIUM CHLORIDE 0.9 % (FLUSH) 0.9 %
1-10 SYRINGE (ML) INJECTION AS NEEDED
Status: DISCONTINUED | OUTPATIENT
Start: 2017-12-02 | End: 2017-12-02 | Stop reason: HOSPADM

## 2017-12-02 RX ORDER — ONDANSETRON 2 MG/ML
4 INJECTION INTRAMUSCULAR; INTRAVENOUS EVERY 6 HOURS PRN
Status: DISCONTINUED | OUTPATIENT
Start: 2017-12-02 | End: 2017-12-02 | Stop reason: HOSPADM

## 2017-12-02 RX ADMIN — SODIUM CHLORIDE, SODIUM LACTATE, POTASSIUM CHLORIDE, CALCIUM CHLORIDE AND DEXTROSE MONOHYDRATE 125 ML/HR: 5; 600; 310; 30; 20 INJECTION, SOLUTION INTRAVENOUS at 02:00

## 2017-12-02 RX ADMIN — SODIUM CHLORIDE, POTASSIUM CHLORIDE, SODIUM LACTATE AND CALCIUM CHLORIDE 500 ML: 600; 310; 30; 20 INJECTION, SOLUTION INTRAVENOUS at 01:36

## 2017-12-02 RX ADMIN — SODIUM CHLORIDE, POTASSIUM CHLORIDE, SODIUM LACTATE AND CALCIUM CHLORIDE 125 ML/HR: 600; 310; 30; 20 INJECTION, SOLUTION INTRAVENOUS at 03:31

## 2017-12-02 RX ADMIN — ONDANSETRON 4 MG: 2 INJECTION INTRAMUSCULAR; INTRAVENOUS at 01:40

## 2017-12-02 NOTE — DISCHARGE INSTR - DIET
No diet restrictions, but drink plenty of fluids. If you get sick again/are unable to keep anything down, call your physician.

## 2017-12-02 NOTE — H&P
[facility]  Obstetric History and Physical    No chief complaint on file.      Subjective     Patient is a 22 y.o. female  currently at 37w3d, who presents with contractions after having nausea, vomiting and diarrhoea all day.   She reports starting tho feel ill yesterday morning with vomiting and diarrhoea several times.   She was not able to tolerate much in the way of PO.   After several hours of this, she also started campbell.  She denies fever or chills.   She received IV hydration and antiemetics.  After 2 liters, she had improvement of all symptoms and felt well enough to go home.         The following portions of the patients history were reviewed and updated as appropriate: current medications, allergies, past medical history, past surgical history, past family history, past social history and problem list .       Prenatal Information:   Prenatal Labs  Lab Results   Component Value Date    RUBELLASCRN Immune 2017    HEPBSAG Non-Reactive 2017    ABO AB 2017    RH Positive 2017    ABSCRN Negative 2017    MKMZXCM1GY 73 2017    GBSANTIGEN NEG 2017         External Prenatal Results         Pregnancy Outside Results - these were transcribed from office records.  See scanned records for details. Date Time   Hgb      Hct      ABO      Rh      Antibody Screen      Glucose Fasting GTT      Glucose Tolerance Test 1 hour ^ 73  17    Glucose Tolerance Test 3 hour      Gonorrhea (discrete) ^ Negative  17    Chlamydia (discrete) ^ Negative  17    RPR ^ Non-Reactive  17    VDRL      Syphillis Antibody      Rubella ^ Immune  17    HBsAg ^ Negative  17    Herpes Simplex Virus PCR      Herpes Simplex VIrus Culture      HIV ^ Negative  17    Hep C RNA Quant PCR      Hep C Antibody      Urine Drug Screen      AFP      Group B Strep ^ NEG  17    GBS Susceptibility to Clindamycin      GBS Susceptibility to Eythromycin      Fetal  Fibronectin      Genetic Testing, Maternal Blood             Legend: ^: Historical            Past OB History:       Obstetric History       T0      L0     SAB0   TAB0   Ectopic0   Multiple0   Live Births0       # Outcome Date GA Lbr Kyle/2nd Weight Sex Delivery Anes PTL Lv   1 Current                   Past Medical History: Past Medical History:   Diagnosis Date   • Chlamydia     treated for chlamydia in beginning of this pregnancy      Past Surgical History Past Surgical History:   Procedure Laterality Date   • APPENDECTOMY        Family History: Family History   Problem Relation Age of Onset   • Hypertension Mother    • Hypertension Father    • Heart disease Maternal Grandmother    • Diabetes Maternal Grandmother    • Heart disease Maternal Grandfather    • Hypertension Paternal Grandmother    • Hypertension Paternal Grandfather       Social History:  reports that she has never smoked. She has never used smokeless tobacco.   reports that she does not drink alcohol.   reports that she does not use illicit drugs.        General ROS: Reviewed within EPIC.  All pertinent negative except as noted    Objective       Vital Signs Range for the last 24 hours  Temperature: Temp:  [97.9 °F (36.6 °C)] 97.9 °F (36.6 °C)   Temp Source: Temp src: Axillary   BP: BP: (118)/(73) 118/73   Pulse: Heart Rate:  [108] 108   Respirations: Resp:  [16] 16   SPO2:     O2 Amount (l/min):     O2 Devices     Weight: Weight:  [163 lb (73.9 kg)] 163 lb (73.9 kg)     Physical Examination: Alert and oriented X 3  Chest - clear to auscultation, no wheezes, rales or rhonchi, symmetric air entry  Heart - normal rate, S1, S2, no murmurs  Abdomen - no rebound tenderness noted  bowel sounds normal  Gravid uterus, No RUQ pain, No guarding  Extremities - Non-tender bilaterally        Cervix: Exam by: Method: sterile exam per RN   Dilation: Dilation: 2   Effacement: Cervical Effacement: 60%   Station: Station: -2       Fetal Heart Rate  Assessment   Method: Fetal HR Assessment Method: external   Beats/min: Fetal HR (Beats/Min): 150   Baseline: Fetal HR Baseline: normal range (110-160 bpm)   Varibility: Fetal HR Variability: moderate (amplitude range 6 to 25 bpm)   Accels: Fetal HR Accelerations: greater than/equal to 15 bpm   Decels: Fetal HR Decelerations: absent   Tracing Category:       Uterine Assessment   Method: Method: TOCO (external toco transducer)   Frequency (min): Contraction Frequency (min): 7-9   Ctx Count in 10 min: Contraction Frequency in 10min: 2   Duration: Contraction Duration (sec): 70-90   Intensity: Contraction Intensity:  (pt states she feels better, less painful contractions)   Intensity by IUPC: Contraction Intensity (mm Hg) by IUPC:     Resting Tone: Uterine Resting Tone: soft by palpation   Resting Tone by IUPC:     [place] Units:           Laboratory Results:  Lab Results   Component Value Date     12/02/2017    HGB 11.7 12/02/2017    HCT 34.2 (L) 12/02/2017    WBC 15.59 (H) 12/02/2017     Lab Results   Component Value Date     12/02/2017    K 3.6 12/02/2017     12/02/2017    CO2 19.0 (L) 12/02/2017    BUN 12 12/02/2017    CREATININE 0.50 (L) 12/02/2017    GLUCOSE 89 12/02/2017    ALBUMIN 3.90 12/02/2017    CALCIUM 9.2 12/02/2017    AST 16 12/02/2017    ALT 12 12/02/2017    BILITOT 0.8 12/02/2017     Lab Results   Component Value Date    SQUAMEPIUA 21-30 (A) 10/14/2017    SPECGRAVUR 1.025 12/02/2017    KETONESU >=80 mg/dL (3+) (A) 12/02/2017    BLOODU Negative 12/02/2017    LEUKOCYTESUR Small (1+) (A) 12/02/2017    NITRITEU Negative 12/02/2017    RBCUA 3-6 (A) 10/14/2017    WBCUA Too Numerous to Count (A) 10/14/2017    BACTERIA 3+ (A) 10/14/2017             Assessment:  1.  Intrauterine pregnancy at 37w3d weeks gestation with reassuring fetal status.    2.  False labour not ruptured  3.  Dehydration due to gastritis  Plan:  1. FHTs  Reactive NST  2. No cervical change or signs and symptoms of SROM  or labour  3. Reviewed labour guidelines  4. All questions have been answered  5. Discharge home with routine OB follow-up      Raul Omer MD  12/2/2017  5:31 AM

## 2017-12-04 ENCOUNTER — TELEPHONE (OUTPATIENT)
Dept: OBSTETRICS AND GYNECOLOGY | Facility: CLINIC | Age: 22
End: 2017-12-04

## 2017-12-04 ENCOUNTER — HOSPITAL ENCOUNTER (OUTPATIENT)
Facility: HOSPITAL | Age: 22
Setting detail: OBSERVATION
Discharge: HOME | End: 2017-12-04
Attending: OBSTETRICS & GYNECOLOGY | Admitting: OBSTETRICS & GYNECOLOGY

## 2017-12-04 ENCOUNTER — ROUTINE PRENATAL (OUTPATIENT)
Dept: OBSTETRICS AND GYNECOLOGY | Facility: CLINIC | Age: 22
End: 2017-12-04

## 2017-12-04 VITALS
BODY MASS INDEX: 26.52 KG/M2 | HEART RATE: 81 BPM | WEIGHT: 165 LBS | DIASTOLIC BLOOD PRESSURE: 74 MMHG | SYSTOLIC BLOOD PRESSURE: 104 MMHG | TEMPERATURE: 98.3 F | RESPIRATION RATE: 16 BRPM | HEIGHT: 66 IN

## 2017-12-04 VITALS — SYSTOLIC BLOOD PRESSURE: 106 MMHG | WEIGHT: 164.8 LBS | DIASTOLIC BLOOD PRESSURE: 78 MMHG | BODY MASS INDEX: 26.6 KG/M2

## 2017-12-04 DIAGNOSIS — Z34.03 ENCOUNTER FOR SUPERVISION OF NORMAL FIRST PREGNANCY IN THIRD TRIMESTER: Primary | ICD-10-CM

## 2017-12-04 LAB
BACTERIA SPEC AEROBE CULT: NORMAL
BACTERIA SPEC AEROBE CULT: NORMAL

## 2017-12-04 PROCEDURE — G0378 HOSPITAL OBSERVATION PER HR: HCPCS

## 2017-12-04 PROCEDURE — 59025 FETAL NON-STRESS TEST: CPT | Performed by: OBSTETRICS & GYNECOLOGY

## 2017-12-04 PROCEDURE — 59025 FETAL NON-STRESS TEST: CPT

## 2017-12-04 PROCEDURE — 99213 OFFICE O/P EST LOW 20 MIN: CPT | Performed by: OBSTETRICS & GYNECOLOGY

## 2017-12-04 NOTE — PROGRESS NOTES
Laborist    Patient sent from office  NST for fetal tachycardia   NST reactive  Mod V, 150s   Reactive, no decels, 15 x 15 accels onset   1600 offset 1737.  D/C to home, f/u ob routine or prn

## 2017-12-04 NOTE — PROGRESS NOTES
Chief Complaint   Patient presents with   • Routine Prenatal Visit     No complaints       HPI: Glen is a  currently at 37w5d who today reports the following:  Contractions - No; Leaking - No; Vaginal bleeding -  No; Swelling of extremities - No.    ROS:  GI: Nausea - No; Constipation - No; Diarrhea - No    Neuro: Headache - No; Visual change - No      EXAM:  Vitals: See prenatal flowsheet   Abdomen: See prenatal flowsheet   Urine glucose/protein: See prenatal flowsheet   Pelvic: See prenatal flowsheet   MDM:  Impression: 1. Supervision of low risk pregnancy   Tests done today: 1. none   Topics discussed: 1. Continue with PNV's  2. Prenatal labs reviewed  3. none - she had no major complaints,questions or concerns   Tests next visit: none   Next visit: See prenatal flowsheet

## 2017-12-04 NOTE — TELEPHONE ENCOUNTER
Dr Romero pt,  letter already written but it needs to be revised to say she has started maternity leave on November 23, she is not waiting until she delivers.   Fax to [phone]

## 2017-12-09 ENCOUNTER — ANESTHESIA EVENT (OUTPATIENT)
Dept: LABOR AND DELIVERY | Facility: HOSPITAL | Age: 22
End: 2017-12-09

## 2017-12-09 ENCOUNTER — HOSPITAL ENCOUNTER (INPATIENT)
Facility: HOSPITAL | Age: 22
LOS: 2 days | Discharge: HOME | End: 2017-12-11
Attending: OBSTETRICS & GYNECOLOGY | Admitting: OBSTETRICS & GYNECOLOGY

## 2017-12-09 ENCOUNTER — ANESTHESIA (OUTPATIENT)
Dept: LABOR AND DELIVERY | Facility: HOSPITAL | Age: 22
End: 2017-12-09

## 2017-12-09 DIAGNOSIS — Z37.9 NORMAL LABOR: ICD-10-CM

## 2017-12-09 DIAGNOSIS — Z3A.38 38 WEEKS GESTATION OF PREGNANCY: Primary | ICD-10-CM

## 2017-12-09 LAB
ABO GROUP BLD: NORMAL
BLD GP AB SCN SERPL QL: NEGATIVE
DEPRECATED RDW RBC AUTO: 40.1 FL (ref 37–54)
ERYTHROCYTE [DISTWIDTH] IN BLOOD BY AUTOMATED COUNT: 12.3 % (ref 11.3–14.5)
HCT VFR BLD AUTO: 32.7 % (ref 34.5–44)
HGB BLD-MCNC: 11.2 G/DL (ref 11.5–15.5)
MCH RBC QN AUTO: 30.6 PG (ref 27–31)
MCHC RBC AUTO-ENTMCNC: 34.3 G/DL (ref 32–36)
MCV RBC AUTO: 89.3 FL (ref 80–99)
PLATELET # BLD AUTO: 332 10*3/MM3 (ref 150–450)
PMV BLD AUTO: 10.2 FL (ref 6–12)
RBC # BLD AUTO: 3.66 10*6/MM3 (ref 3.89–5.14)
RH BLD: POSITIVE
WBC NRBC COR # BLD: 15.09 10*3/MM3 (ref 4.5–13.5)

## 2017-12-09 PROCEDURE — 25010000002 FENTANYL CITRATE (PF) 100 MCG/2ML SOLUTION: Performed by: ANESTHESIOLOGY

## 2017-12-09 PROCEDURE — 4A0HX4Z MEASUREMENT OF PRODUCTS OF CONCEPTION, CARDIAC ELECTRICAL ACTIVITY, EXTERNAL APPROACH: ICD-10-PCS | Performed by: OBSTETRICS & GYNECOLOGY

## 2017-12-09 PROCEDURE — C1755 CATHETER, INTRASPINAL: HCPCS

## 2017-12-09 PROCEDURE — C1755 CATHETER, INTRASPINAL: HCPCS | Performed by: ANESTHESIOLOGY

## 2017-12-09 PROCEDURE — 86901 BLOOD TYPING SEROLOGIC RH(D): CPT | Performed by: OBSTETRICS & GYNECOLOGY

## 2017-12-09 PROCEDURE — 59410 OBSTETRICAL CARE: CPT | Performed by: OBSTETRICS & GYNECOLOGY

## 2017-12-09 PROCEDURE — 59025 FETAL NON-STRESS TEST: CPT

## 2017-12-09 PROCEDURE — 86850 RBC ANTIBODY SCREEN: CPT | Performed by: OBSTETRICS & GYNECOLOGY

## 2017-12-09 PROCEDURE — 86900 BLOOD TYPING SEROLOGIC ABO: CPT | Performed by: OBSTETRICS & GYNECOLOGY

## 2017-12-09 PROCEDURE — 25010000002 ROPIVACAINE PER 1 MG: Performed by: ANESTHESIOLOGY

## 2017-12-09 PROCEDURE — 85027 COMPLETE CBC AUTOMATED: CPT | Performed by: OBSTETRICS & GYNECOLOGY

## 2017-12-09 PROCEDURE — 25010000002 BUTORPHANOL PER 1 MG: Performed by: OBSTETRICS & GYNECOLOGY

## 2017-12-09 RX ORDER — CARBOPROST TROMETHAMINE 250 UG/ML
250 INJECTION, SOLUTION INTRAMUSCULAR AS NEEDED
Status: DISCONTINUED | OUTPATIENT
Start: 2017-12-09 | End: 2017-12-09 | Stop reason: HOSPADM

## 2017-12-09 RX ORDER — PRENATAL VIT/IRON FUM/FOLIC AC 27MG-0.8MG
1 TABLET ORAL DAILY
Status: DISCONTINUED | OUTPATIENT
Start: 2017-12-09 | End: 2017-12-11 | Stop reason: HOSPADM

## 2017-12-09 RX ORDER — DIPHENHYDRAMINE HYDROCHLORIDE 50 MG/ML
12.5 INJECTION INTRAMUSCULAR; INTRAVENOUS EVERY 8 HOURS PRN
Status: DISCONTINUED | OUTPATIENT
Start: 2017-12-09 | End: 2017-12-09 | Stop reason: HOSPADM

## 2017-12-09 RX ORDER — BISACODYL 10 MG
10 SUPPOSITORY, RECTAL RECTAL DAILY PRN
Status: DISCONTINUED | OUTPATIENT
Start: 2017-12-10 | End: 2017-12-11 | Stop reason: HOSPADM

## 2017-12-09 RX ORDER — HYDROCODONE BITARTRATE AND ACETAMINOPHEN 5; 325 MG/1; MG/1
1 TABLET ORAL EVERY 4 HOURS PRN
Status: DISCONTINUED | OUTPATIENT
Start: 2017-12-09 | End: 2017-12-11 | Stop reason: HOSPADM

## 2017-12-09 RX ORDER — ONDANSETRON 2 MG/ML
4 INJECTION INTRAMUSCULAR; INTRAVENOUS ONCE AS NEEDED
Status: DISCONTINUED | OUTPATIENT
Start: 2017-12-09 | End: 2017-12-09 | Stop reason: HOSPADM

## 2017-12-09 RX ORDER — SODIUM CHLORIDE 0.9 % (FLUSH) 0.9 %
1-10 SYRINGE (ML) INJECTION AS NEEDED
Status: DISCONTINUED | OUTPATIENT
Start: 2017-12-09 | End: 2017-12-09 | Stop reason: HOSPADM

## 2017-12-09 RX ORDER — OXYTOCIN/RINGER'S LACTATE 20/1000 ML
125 PLASTIC BAG, INJECTION (ML) INTRAVENOUS CONTINUOUS PRN
Status: DISCONTINUED | OUTPATIENT
Start: 2017-12-09 | End: 2017-12-11 | Stop reason: HOSPADM

## 2017-12-09 RX ORDER — ZOLPIDEM TARTRATE 5 MG/1
5 TABLET ORAL NIGHTLY PRN
Status: DISCONTINUED | OUTPATIENT
Start: 2017-12-09 | End: 2017-12-11 | Stop reason: HOSPADM

## 2017-12-09 RX ORDER — LIDOCAINE HYDROCHLORIDE 10 MG/ML
5 INJECTION, SOLUTION EPIDURAL; INFILTRATION; INTRACAUDAL; PERINEURAL AS NEEDED
Status: DISCONTINUED | OUTPATIENT
Start: 2017-12-09 | End: 2017-12-09 | Stop reason: HOSPADM

## 2017-12-09 RX ORDER — EPHEDRINE SULFATE/0.9% NACL/PF 50 MG/10ML
10 SYRINGE (ML) INTRAVENOUS
Status: DISCONTINUED | OUTPATIENT
Start: 2017-12-09 | End: 2017-12-09 | Stop reason: HOSPADM

## 2017-12-09 RX ORDER — METOCLOPRAMIDE HYDROCHLORIDE 5 MG/ML
10 INJECTION INTRAMUSCULAR; INTRAVENOUS ONCE AS NEEDED
Status: DISCONTINUED | OUTPATIENT
Start: 2017-12-09 | End: 2017-12-09 | Stop reason: HOSPADM

## 2017-12-09 RX ORDER — LANOLIN 100 %
OINTMENT (GRAM) TOPICAL
Status: DISCONTINUED | OUTPATIENT
Start: 2017-12-09 | End: 2017-12-11 | Stop reason: HOSPADM

## 2017-12-09 RX ORDER — MAGNESIUM CARB/ALUMINUM HYDROX 105-160MG
30 TABLET,CHEWABLE ORAL ONCE
Status: DISCONTINUED | OUTPATIENT
Start: 2017-12-09 | End: 2017-12-09 | Stop reason: HOSPADM

## 2017-12-09 RX ORDER — ROPIVACAINE HYDROCHLORIDE 2 MG/ML
15 INJECTION, SOLUTION EPIDURAL; INFILTRATION; PERINEURAL CONTINUOUS
Status: DISCONTINUED | OUTPATIENT
Start: 2017-12-09 | End: 2017-12-09

## 2017-12-09 RX ORDER — SODIUM CHLORIDE 0.9 % (FLUSH) 0.9 %
1-10 SYRINGE (ML) INJECTION AS NEEDED
Status: DISCONTINUED | OUTPATIENT
Start: 2017-12-09 | End: 2017-12-11 | Stop reason: HOSPADM

## 2017-12-09 RX ORDER — ONDANSETRON 4 MG/1
4 TABLET, FILM COATED ORAL EVERY 6 HOURS PRN
Status: DISCONTINUED | OUTPATIENT
Start: 2017-12-09 | End: 2017-12-09 | Stop reason: HOSPADM

## 2017-12-09 RX ORDER — FAMOTIDINE 10 MG/ML
20 INJECTION, SOLUTION INTRAVENOUS ONCE AS NEEDED
Status: DISCONTINUED | OUTPATIENT
Start: 2017-12-09 | End: 2017-12-09 | Stop reason: HOSPADM

## 2017-12-09 RX ORDER — OXYTOCIN/RINGER'S LACTATE 20/1000 ML
125 PLASTIC BAG, INJECTION (ML) INTRAVENOUS CONTINUOUS PRN
Status: DISPENSED | OUTPATIENT
Start: 2017-12-09 | End: 2017-12-10

## 2017-12-09 RX ORDER — METHYLERGONOVINE MALEATE 0.2 MG/ML
200 INJECTION INTRAVENOUS ONCE AS NEEDED
Status: DISCONTINUED | OUTPATIENT
Start: 2017-12-09 | End: 2017-12-09 | Stop reason: HOSPADM

## 2017-12-09 RX ORDER — FENTANYL CITRATE 50 UG/ML
INJECTION, SOLUTION INTRAMUSCULAR; INTRAVENOUS AS NEEDED
Status: DISCONTINUED | OUTPATIENT
Start: 2017-12-09 | End: 2017-12-09 | Stop reason: SURG

## 2017-12-09 RX ORDER — OXYTOCIN/RINGER'S LACTATE 20/1000 ML
999 PLASTIC BAG, INJECTION (ML) INTRAVENOUS CONTINUOUS PRN
Status: DISCONTINUED | OUTPATIENT
Start: 2017-12-09 | End: 2017-12-11 | Stop reason: HOSPADM

## 2017-12-09 RX ORDER — ONDANSETRON 2 MG/ML
4 INJECTION INTRAMUSCULAR; INTRAVENOUS EVERY 6 HOURS PRN
Status: DISCONTINUED | OUTPATIENT
Start: 2017-12-09 | End: 2017-12-09 | Stop reason: HOSPADM

## 2017-12-09 RX ORDER — TRISODIUM CITRATE DIHYDRATE AND CITRIC ACID MONOHYDRATE 500; 334 MG/5ML; MG/5ML
30 SOLUTION ORAL ONCE
Status: DISCONTINUED | OUTPATIENT
Start: 2017-12-09 | End: 2017-12-09 | Stop reason: HOSPADM

## 2017-12-09 RX ORDER — DOCUSATE SODIUM 100 MG/1
100 CAPSULE, LIQUID FILLED ORAL 2 TIMES DAILY
Status: DISCONTINUED | OUTPATIENT
Start: 2017-12-09 | End: 2017-12-11 | Stop reason: HOSPADM

## 2017-12-09 RX ORDER — LIDOCAINE HYDROCHLORIDE AND EPINEPHRINE 20; 5 MG/ML; UG/ML
INJECTION, SOLUTION EPIDURAL; INFILTRATION; INTRACAUDAL; PERINEURAL AS NEEDED
Status: DISCONTINUED | OUTPATIENT
Start: 2017-12-09 | End: 2017-12-09 | Stop reason: SURG

## 2017-12-09 RX ORDER — OXYTOCIN/RINGER'S LACTATE 30/500 ML
2-24 PLASTIC BAG, INJECTION (ML) INTRAVENOUS
Status: DISCONTINUED | OUTPATIENT
Start: 2017-12-09 | End: 2017-12-09

## 2017-12-09 RX ORDER — OXYTOCIN/RINGER'S LACTATE 20/1000 ML
999 PLASTIC BAG, INJECTION (ML) INTRAVENOUS ONCE
Status: DISCONTINUED | OUTPATIENT
Start: 2017-12-09 | End: 2017-12-11 | Stop reason: HOSPADM

## 2017-12-09 RX ORDER — SODIUM CHLORIDE, SODIUM LACTATE, POTASSIUM CHLORIDE, CALCIUM CHLORIDE 600; 310; 30; 20 MG/100ML; MG/100ML; MG/100ML; MG/100ML
125 INJECTION, SOLUTION INTRAVENOUS CONTINUOUS
Status: DISCONTINUED | OUTPATIENT
Start: 2017-12-09 | End: 2017-12-09

## 2017-12-09 RX ORDER — ACETAMINOPHEN 325 MG/1
650 TABLET ORAL EVERY 4 HOURS PRN
Status: DISCONTINUED | OUTPATIENT
Start: 2017-12-09 | End: 2017-12-09 | Stop reason: HOSPADM

## 2017-12-09 RX ORDER — IBUPROFEN 600 MG/1
600 TABLET ORAL EVERY 6 HOURS PRN
Status: DISCONTINUED | OUTPATIENT
Start: 2017-12-09 | End: 2017-12-11 | Stop reason: HOSPADM

## 2017-12-09 RX ORDER — MISOPROSTOL 200 UG/1
800 TABLET ORAL AS NEEDED
Status: DISCONTINUED | OUTPATIENT
Start: 2017-12-09 | End: 2017-12-09 | Stop reason: HOSPADM

## 2017-12-09 RX ORDER — LIDOCAINE HYDROCHLORIDE AND EPINEPHRINE 15; 5 MG/ML; UG/ML
INJECTION, SOLUTION EPIDURAL AS NEEDED
Status: DISCONTINUED | OUTPATIENT
Start: 2017-12-09 | End: 2017-12-09 | Stop reason: SURG

## 2017-12-09 RX ADMIN — ROPIVACAINE HYDROCHLORIDE 15 ML/HR: 2 INJECTION, SOLUTION EPIDURAL; INFILTRATION at 09:49

## 2017-12-09 RX ADMIN — IBUPROFEN 600 MG: 600 TABLET, FILM COATED ORAL at 17:55

## 2017-12-09 RX ADMIN — SODIUM CHLORIDE, POTASSIUM CHLORIDE, SODIUM LACTATE AND CALCIUM CHLORIDE 125 ML/HR: 600; 310; 30; 20 INJECTION, SOLUTION INTRAVENOUS at 09:33

## 2017-12-09 RX ADMIN — FENTANYL CITRATE 100 MCG: 50 INJECTION, SOLUTION INTRAMUSCULAR; INTRAVENOUS at 09:48

## 2017-12-09 RX ADMIN — BUTORPHANOL TARTRATE 1 MG: 2 INJECTION, SOLUTION INTRAMUSCULAR; INTRAVENOUS at 05:09

## 2017-12-09 RX ADMIN — HYDROCODONE BITARTRATE AND ACETAMINOPHEN 1 TABLET: 5; 325 TABLET ORAL at 22:03

## 2017-12-09 RX ADMIN — LIDOCAINE HYDROCHLORIDE AND EPINEPHRINE 3 ML: 15; 5 INJECTION, SOLUTION EPIDURAL at 09:41

## 2017-12-09 RX ADMIN — LIDOCAINE HYDROCHLORIDE AND EPINEPHRINE 2 ML: 15; 5 INJECTION, SOLUTION EPIDURAL at 09:43

## 2017-12-09 RX ADMIN — LIDOCAINE HYDROCHLORIDE,EPINEPHRINE BITARTRATE 5 ML: 20; .005 INJECTION, SOLUTION EPIDURAL; INFILTRATION; INTRACAUDAL; PERINEURAL at 14:28

## 2017-12-09 RX ADMIN — ROPIVACAINE HYDROCHLORIDE 12 ML: 5 INJECTION, SOLUTION EPIDURAL; INFILTRATION; PERINEURAL at 09:45

## 2017-12-09 RX ADMIN — SODIUM CHLORIDE, POTASSIUM CHLORIDE, SODIUM LACTATE AND CALCIUM CHLORIDE 1000 ML/HR: 600; 310; 30; 20 INJECTION, SOLUTION INTRAVENOUS at 09:05

## 2017-12-09 RX ADMIN — SODIUM CHLORIDE, POTASSIUM CHLORIDE, SODIUM LACTATE AND CALCIUM CHLORIDE 125 ML/HR: 600; 310; 30; 20 INJECTION, SOLUTION INTRAVENOUS at 13:13

## 2017-12-09 RX ADMIN — OXYTOCIN 2 MILLI-UNITS/MIN: 10 INJECTION INTRAVENOUS at 13:45

## 2017-12-09 RX ADMIN — HYDROCODONE BITARTRATE AND ACETAMINOPHEN 1 TABLET: 5; 325 TABLET ORAL at 17:55

## 2017-12-09 RX ADMIN — SODIUM CHLORIDE, POTASSIUM CHLORIDE, SODIUM LACTATE AND CALCIUM CHLORIDE 125 ML/HR: 600; 310; 30; 20 INJECTION, SOLUTION INTRAVENOUS at 03:56

## 2017-12-09 NOTE — PLAN OF CARE
Problem: Patient Care Overview (Adult)  Goal: Plan of Care Review  Outcome: Ongoing (interventions implemented as appropriate)    12/09/17 1822   Coping/Psychosocial Response Interventions   Plan Of Care Reviewed With patient;significant other   Patient Care Overview   Progress progress toward functional goals as expected       Goal: Adult Individualization and Mutuality  Outcome: Ongoing (interventions implemented as appropriate)  Goal: Discharge Needs Assessment  Outcome: Ongoing (interventions implemented as appropriate)    Problem: Labor (Cervical Ripen, Induct, Augment) (Adult,Obstetrics,Pediatric)  Goal: Signs and Symptoms of Listed Potential Problems Will be Absent or Manageable (Labor)  Outcome: Outcome(s) achieved Date Met:  12/09/17

## 2017-12-09 NOTE — PROGRESS NOTES
Demetria Bey  : 1995  MRN: [id number]  CSN: [id number]    Labor progress note    Subjective   She reports pain is well contolled and comfortable with epidural     Objective   Min/max vitals past 24 hours:  Temp  Min: 97.7 °F (36.5 °C)  Max: 98.6 °F (37 °C)   BP  Min: 99/54  Max: 132/82   Pulse  Min: 72  Max: 116   Resp  Min: 16  Max: 18        FHT's: reassuring, reactive, variable decelerations are present and category 1   Cervix: was checked (by me): 7 cm / 90 % / 0   Contractions: irregular      Assessment   1. IUP at 38w3d  2. Progressing in labor  3. Fetal status reassuring     Plan   1. Expectant management  2. Augment with pitocin  3. IUPC placed  4. FSE placed  5. Recheck cervix in 2 hours    Maynor Romero MD  2017  2:00 PM

## 2017-12-09 NOTE — ANESTHESIA PREPROCEDURE EVALUATION
Anesthesia Evaluation     Patient summary reviewed and Nursing notes reviewed   NPO Solid Status: > 8 hours  NPO Liquid Status: > 8 hours     Airway   Mallampati: II  TM distance: >3 FB  Neck ROM: full  no difficulty expected  Dental      Pulmonary - negative pulmonary ROS   Cardiovascular - negative cardio ROS        Neuro/Psych- negative ROS  GI/Hepatic/Renal/Endo - negative ROS     Musculoskeletal (-) negative ROS    Abdominal    Substance History - negative use     OB/GYN    (+) Pregnant,         Other - negative ROS                                       Anesthesia Plan    ASA 2     epidural     Anesthetic plan and risks discussed with patient and spouse/significant other.

## 2017-12-09 NOTE — ANESTHESIA PROCEDURE NOTES
Labor Epidural    Patient location during procedure: OB  Performed By  Anesthesiologist: RAIN MUNOZ  Preanesthetic Checklist  Completed: patient identified, surgical consent, pre-op evaluation, timeout performed, IV checked, risks and benefits discussed and monitors and equipment checked  Prep:  Pt Position:sitting  Sterile Tech:cap, gloves, mask and sterile barrier  Prep:DuraPrep  Monitoring:blood pressure monitoring  Epidural Block Procedure:  Approach:midline  Guidance:palpation technique  Location:L3-L4  Needle Type:Tuohy  Needle Gauge:17 G  Loss of Resistance Medium: air  Loss of Resistance: 4cm  Cath Depth at skin:11 cm  Paresthesia: none  Aspiration:negative  Test Dose:negative  Number of Attempts: 1  Post Assessment:  Dressing:occlusive dressing applied and secured with tape  Pt Tolerance:patient tolerated the procedure well with no apparent complications  Complications:no

## 2017-12-10 LAB
BASOPHILS # BLD AUTO: 0.03 10*3/MM3 (ref 0–0.2)
BASOPHILS NFR BLD AUTO: 0.2 % (ref 0–1)
DEPRECATED RDW RBC AUTO: 41.9 FL (ref 37–54)
EOSINOPHIL # BLD AUTO: 0.17 10*3/MM3 (ref 0–0.3)
EOSINOPHIL NFR BLD AUTO: 1.1 % (ref 0–3)
ERYTHROCYTE [DISTWIDTH] IN BLOOD BY AUTOMATED COUNT: 12.7 % (ref 11.3–14.5)
HCT VFR BLD AUTO: 31.6 % (ref 34.5–44)
HGB BLD-MCNC: 10.6 G/DL (ref 11.5–15.5)
IMM GRANULOCYTES # BLD: 0.07 10*3/MM3 (ref 0–0.03)
IMM GRANULOCYTES NFR BLD: 0.5 % (ref 0–0.6)
LYMPHOCYTES # BLD AUTO: 2.91 10*3/MM3 (ref 0.6–4.8)
LYMPHOCYTES NFR BLD AUTO: 18.9 % (ref 24–44)
MCH RBC QN AUTO: 30.4 PG (ref 27–31)
MCHC RBC AUTO-ENTMCNC: 33.5 G/DL (ref 32–36)
MCV RBC AUTO: 90.5 FL (ref 80–99)
MONOCYTES # BLD AUTO: 0.68 10*3/MM3 (ref 0–1)
MONOCYTES NFR BLD AUTO: 4.4 % (ref 0–12)
NEUTROPHILS # BLD AUTO: 11.54 10*3/MM3 (ref 1.5–8.3)
NEUTROPHILS NFR BLD AUTO: 74.9 % (ref 41–71)
PLATELET # BLD AUTO: 291 10*3/MM3 (ref 150–450)
PMV BLD AUTO: 10.3 FL (ref 6–12)
RBC # BLD AUTO: 3.49 10*6/MM3 (ref 3.89–5.14)
WBC NRBC COR # BLD: 15.4 10*3/MM3 (ref 4.5–13.5)

## 2017-12-10 PROCEDURE — 85025 COMPLETE CBC W/AUTO DIFF WBC: CPT | Performed by: OBSTETRICS & GYNECOLOGY

## 2017-12-10 PROCEDURE — 99231 SBSQ HOSP IP/OBS SF/LOW 25: CPT | Performed by: OBSTETRICS & GYNECOLOGY

## 2017-12-10 RX ADMIN — IBUPROFEN 600 MG: 600 TABLET, FILM COATED ORAL at 19:50

## 2017-12-10 RX ADMIN — IBUPROFEN 600 MG: 600 TABLET, FILM COATED ORAL at 06:16

## 2017-12-10 NOTE — PROGRESS NOTES
12/10/2017  PPD #1    Subjective   Glen feels good.  Patient describes her lochia less than menses.  Pain is well controlled       Objective   Temp: Temp:  [97.6 °F (36.4 °C)-98.9 °F (37.2 °C)] 98.1 °F (36.7 °C) Temp src: Oral   BP: BP: ()/(52-96) 101/60        Pulse: Heart Rate:  [] 68  RR: Resp:  [14-18] 14    General:  No acute distress   Abdomen: Fundus firm and beneath umbilicus   Pelvis: deferred     Lab Results   Component Value Date    WBC 15.40 (H) 12/10/2017    HGB 10.6 (L) 12/10/2017    HCT 31.6 (L) 12/10/2017    MCV 90.5 12/10/2017     12/10/2017    HEPBSAG Non-Reactive 05/02/2017       Assessment  1. Stable after vaginal delivery.    Plan  1. Routine postpartum care.      This note has been electronically signed.    Craig Howard MD  December 10, 2017

## 2017-12-10 NOTE — PLAN OF CARE
Problem: Patient Care Overview (Adult)  Goal: Plan of Care Review  Outcome: Ongoing (interventions implemented as appropriate)    12/10/17 0429   Coping/Psychosocial Response Interventions   Plan Of Care Reviewed With patient   Patient Care Overview   Progress improving       Goal: Adult Individualization and Mutuality  Outcome: Ongoing (interventions implemented as appropriate)  Goal: Discharge Needs Assessment  Outcome: Ongoing (interventions implemented as appropriate)    Problem: Postpartum, Vaginal Delivery (Adult)  Goal: Signs and Symptoms of Listed Potential Problems Will be Absent or Manageable (Postpartum, Vaginal Delivery)  Outcome: Ongoing (interventions implemented as appropriate)

## 2017-12-11 VITALS
BODY MASS INDEX: 26.52 KG/M2 | WEIGHT: 165 LBS | HEIGHT: 66 IN | TEMPERATURE: 98.2 F | DIASTOLIC BLOOD PRESSURE: 77 MMHG | HEART RATE: 73 BPM | RESPIRATION RATE: 16 BRPM | SYSTOLIC BLOOD PRESSURE: 113 MMHG

## 2017-12-11 PROBLEM — Z3A.01 LESS THAN 8 WEEKS GESTATION OF PREGNANCY: Status: RESOLVED | Noted: 2017-05-02 | Resolved: 2017-12-11

## 2017-12-11 PROBLEM — A56.8 CHLAMYDIA TRACHOMATIS INFECTION DURING PREGNANCY IN SECOND TRIMESTER: Status: RESOLVED | Noted: 2017-07-31 | Resolved: 2017-12-11

## 2017-12-11 PROBLEM — Z37.9 NORMAL LABOR: Status: RESOLVED | Noted: 2017-12-09 | Resolved: 2017-12-11

## 2017-12-11 PROBLEM — O98.312 CHLAMYDIA TRACHOMATIS INFECTION DURING PREGNANCY IN SECOND TRIMESTER: Status: RESOLVED | Noted: 2017-07-31 | Resolved: 2017-12-11

## 2017-12-11 PROBLEM — Z34.90 PREGNANCY: Status: RESOLVED | Noted: 2017-10-14 | Resolved: 2017-12-11

## 2017-12-11 PROBLEM — Z34.03 ENCOUNTER FOR SUPERVISION OF NORMAL FIRST PREGNANCY IN THIRD TRIMESTER: Status: RESOLVED | Noted: 2017-07-21 | Resolved: 2017-12-11

## 2017-12-11 PROCEDURE — 99238 HOSP IP/OBS DSCHRG MGMT 30/<: CPT | Performed by: OBSTETRICS & GYNECOLOGY

## 2017-12-11 RX ORDER — IBUPROFEN 800 MG/1
800 TABLET ORAL EVERY 8 HOURS PRN
Qty: 90 TABLET | Refills: 3 | Status: SHIPPED | OUTPATIENT
Start: 2017-12-11 | End: 2020-01-31

## 2017-12-11 RX ORDER — PSEUDOEPHEDRINE HCL 30 MG
100 TABLET ORAL 2 TIMES DAILY
Qty: 60 CAPSULE | Refills: 3 | Status: SHIPPED | OUTPATIENT
Start: 2017-12-11 | End: 2020-01-31

## 2017-12-11 NOTE — DISCHARGE SUMMARY
Discharge Summary     Demetria Bey  : 1995  MRN: [id number]  CSN: [id number]    Date of Admission: 2017   Date of Discharge:  2017   Delivering Physician: Maynor Romero        Admission Diagnosis: 1. Normal labor [O80, Z37.9]   Discharge Diagnosis: 1. Same as above plus  2. Pregnancy at 38w3d - delivered       Procedures: 2017  - Vaginal, Spontaneous Delivery       Hospital Course  Patient is a 22 y.o.  who at 38w3d had a vaginal birth.  Her postpartum course was without complications.  On PPD #2 she was ready for discharge.  She had normal lochia and pain well controlled with oral medications.    Infant  female  fetus weighing 3280 g (7 lb 3.7 oz)   Apgars -  9  @ 1 minute /  9  @ 5 minutes.    Discharge labs  Lab Results   Component Value Date    WBC 15.40 (H) 12/10/2017    HGB 10.6 (L) 12/10/2017    HCT 31.6 (L) 12/10/2017     12/10/2017       Discharge Medications   Glen Bey   Home Medication Instructions JOHAN:[id number]    Printed on:17 1220   Medication Information                      benzocaine-lanolin-aloe vera (DERMOPLAST) 20-0.5 % aerosol topical spray  Apply  topically As Needed for Mild Pain  (perineal pain).             docusate sodium 100 MG capsule  Take 100 mg by mouth 2 (Two) Times a Day.             ibuprofen (ADVIL,MOTRIN) 800 MG tablet  Take 1 tablet by mouth Every 8 (Eight) Hours As Needed for Mild Pain , Moderate Pain  or Headache.             Prenatal Vit-Fe Fumarate-FA (PRENATAL VITAMIN PO)  Take  by mouth.             valACYclovir (VALTREX) 1000 MG tablet  TAKE 2 TABLETS BY MOUTH AS SOON AS SYMPOMS APPEAR TWICE A DAY                 Discharge Disposition Home or Self Care   Condition on Discharge: good   Follow-up: 6 weeks with Dr. Nick Romero MD  2017

## 2019-10-18 ENCOUNTER — INITIAL PRENATAL (OUTPATIENT)
Dept: OBSTETRICS AND GYNECOLOGY | Facility: CLINIC | Age: 24
End: 2019-10-18

## 2019-10-18 VITALS — SYSTOLIC BLOOD PRESSURE: 108 MMHG | BODY MASS INDEX: 22.61 KG/M2 | WEIGHT: 140 LBS | DIASTOLIC BLOOD PRESSURE: 68 MMHG

## 2019-10-18 DIAGNOSIS — Z36.87 UNSURE OF LMP (LAST MENSTRUAL PERIOD) AS REASON FOR ULTRASOUND SCAN: ICD-10-CM

## 2019-10-18 DIAGNOSIS — Z34.81 PRENATAL CARE, SUBSEQUENT PREGNANCY, FIRST TRIMESTER: Primary | ICD-10-CM

## 2019-10-18 PROCEDURE — 99214 OFFICE O/P EST MOD 30 MIN: CPT | Performed by: OBSTETRICS & GYNECOLOGY

## 2019-10-18 NOTE — PROGRESS NOTES
@SUBJECTIVEBEGIN  Chief Complaint   Patient presents with   • Initial Prenatal Visit     New OB. Dr. Romero Pt. no complaints       Glen Bey is a 24 y.o. year old .  No LMP recorded. Patient is pregnant..  She presents to be seen to initiate prenatal care.  She complains of breast tenderness, fatigue, nausea and positive home pregnancy test. These symptoms have been occurring for approximately 2 months.  She states that nothing helps to alleviate her symptoms.  Pre-existing conditions that could possibly affect the pregnancy are none.  Patient is unsure of her last menstrual period but is had 3+ home pregnancy test.  She presents today for initial OB work-up.  She is having minimal symptoms.  She needs an ultrasound to establish her EDC.    Past Medical History:   Diagnosis Date   • Chlamydia     treated for chlamydia in beginning of this pregnancy   , Past Surgical History:   Procedure Laterality Date   • APPENDECTOMY     , Family History   Problem Relation Age of Onset   • Hypertension Mother    • Hypertension Father    • Heart disease Maternal Grandmother    • Diabetes Maternal Grandmother    • Heart disease Maternal Grandfather    • Hypertension Paternal Grandmother    • Hypertension Paternal Grandfather    , Social History     Tobacco Use   • Smoking status: Never Smoker   • Smokeless tobacco: Never Used   Substance Use Topics   • Alcohol use: No   • Drug use: No   ,   (Not in a hospital admission) and Allergies:  Patient has no known allergies.    Social History    Tobacco Use      Smoking status: Never Smoker      Smokeless tobacco: Never Used      The following portions of the patient's history were reviewed and updated as appropriate:vital signs, allergies, current medications, past medical history, past social history, past surgical history and problem list.    Objective     Imaging   No data reviewed    Review of Systems - History obtained from the patient  General ROS:  negative  Psychological ROS: positive for - anxiety  ENT ROS: negative  Allergy and Immunology ROS: negative  Hematological and Lymphatic ROS: positive for - bruising  Endocrine ROS: negative  Breast ROS: negative for breast lumps  Respiratory ROS: no cough, shortness of breath, or wheezing  Cardiovascular ROS: no chest pain or dyspnea on exertion  Gastrointestinal ROS: positive for - nausea/vomiting  Genito-Urinary ROS: no dysuria, trouble voiding, or hematuria  Musculoskeletal ROS: negative  Neurological ROS: no TIA or stroke symptoms  Dermatological ROS: negative     Physical Exam:  See Episode    Assessment/Plan   ASSESSMENT  Glen was seen today for initial prenatal visit.    Diagnoses and all orders for this visit:    Prenatal care, subsequent pregnancy, first trimester  -     US Ob Transvaginal    Unsure of LMP (last menstrual period) as reason for ultrasound scan  -     US Ob Transvaginal        PLAN  1. Tests ordered today:  No orders of the defined types were placed in this encounter.    2. Medications prescribed today:  No orders of the defined types were placed in this encounter.    3. Information reviewed: exercise in pregnancy, nutrition in pregnancy, weight gain in pregnancy, work and travel restrictions during pregnancy, list of OTC medications acceptable in pregnancy and call coverage groups  4. Genetic testing reviewed: she will consider the information and make a decision at a later date.  5. The problem list for pregnancy was initiated today    Follow up: 1 week(s), ultrasound shows a gestational sac questionable fetal pole and no yolk sac.  This is possible missed .  Return in 1 week for repeat ultrasound.       This note was electronically signed.    Merrick Barry MD   2019

## 2019-10-25 ENCOUNTER — LAB (OUTPATIENT)
Dept: LAB | Facility: HOSPITAL | Age: 24
End: 2019-10-25

## 2019-10-25 ENCOUNTER — ROUTINE PRENATAL (OUTPATIENT)
Dept: OBSTETRICS AND GYNECOLOGY | Facility: CLINIC | Age: 24
End: 2019-10-25

## 2019-10-25 VITALS — WEIGHT: 141 LBS | SYSTOLIC BLOOD PRESSURE: 110 MMHG | DIASTOLIC BLOOD PRESSURE: 66 MMHG | BODY MASS INDEX: 22.77 KG/M2

## 2019-10-25 DIAGNOSIS — O21.9 NAUSEA AND VOMITING DURING PREGNANCY PRIOR TO 22 WEEKS GESTATION: ICD-10-CM

## 2019-10-25 DIAGNOSIS — O36.80X0 PREGNANCY WITH INCONCLUSIVE FETAL VIABILITY, SINGLE OR UNSPECIFIED FETUS: ICD-10-CM

## 2019-10-25 DIAGNOSIS — Z34.81 PRENATAL CARE, SUBSEQUENT PREGNANCY, FIRST TRIMESTER: Primary | ICD-10-CM

## 2019-10-25 DIAGNOSIS — Z36.87 UNSURE OF LMP (LAST MENSTRUAL PERIOD) AS REASON FOR ULTRASOUND SCAN: ICD-10-CM

## 2019-10-25 DIAGNOSIS — Z34.81 PRENATAL CARE, SUBSEQUENT PREGNANCY, FIRST TRIMESTER: ICD-10-CM

## 2019-10-25 LAB
ABO GROUP BLD: NORMAL
BASOPHILS # BLD AUTO: 0.09 10*3/MM3 (ref 0–0.2)
BASOPHILS NFR BLD AUTO: 1.1 % (ref 0–1.5)
BLD GP AB SCN SERPL QL: NEGATIVE
DEPRECATED RDW RBC AUTO: 39.6 FL (ref 37–54)
EOSINOPHIL # BLD AUTO: 0.29 10*3/MM3 (ref 0–0.4)
EOSINOPHIL NFR BLD AUTO: 3.5 % (ref 0.3–6.2)
ERYTHROCYTE [DISTWIDTH] IN BLOOD BY AUTOMATED COUNT: 12 % (ref 12.3–15.4)
HBA1C MFR BLD: 4.88 % (ref 4.8–5.6)
HBV SURFACE AG SERPL QL IA: NORMAL
HCG INTACT+B SERPL-ACNC: NORMAL MIU/ML
HCT VFR BLD AUTO: 37.8 % (ref 34–46.6)
HCV AB SER DONR QL: NORMAL
HGB BLD-MCNC: 13.1 G/DL (ref 12–15.9)
HIV1+2 AB SER QL: NORMAL
IMM GRANULOCYTES # BLD AUTO: 0.03 10*3/MM3 (ref 0–0.05)
IMM GRANULOCYTES NFR BLD AUTO: 0.4 % (ref 0–0.5)
LYMPHOCYTES # BLD AUTO: 2.24 10*3/MM3 (ref 0.7–3.1)
LYMPHOCYTES NFR BLD AUTO: 26.9 % (ref 19.6–45.3)
MCH RBC QN AUTO: 31.9 PG (ref 26.6–33)
MCHC RBC AUTO-ENTMCNC: 34.7 G/DL (ref 31.5–35.7)
MCV RBC AUTO: 92 FL (ref 79–97)
MONOCYTES # BLD AUTO: 0.43 10*3/MM3 (ref 0.1–0.9)
MONOCYTES NFR BLD AUTO: 5.2 % (ref 5–12)
NEUTROPHILS # BLD AUTO: 5.26 10*3/MM3 (ref 1.7–7)
NEUTROPHILS NFR BLD AUTO: 62.9 % (ref 42.7–76)
NRBC BLD AUTO-RTO: 0 /100 WBC (ref 0–0.2)
PLATELET # BLD AUTO: 341 10*3/MM3 (ref 140–450)
PMV BLD AUTO: 10.6 FL (ref 6–12)
PROGEST SERPL-MCNC: 14.2 NG/ML
RBC # BLD AUTO: 4.11 10*6/MM3 (ref 3.77–5.28)
RH BLD: POSITIVE
TSH SERPL DL<=0.05 MIU/L-ACNC: 1.85 UIU/ML (ref 0.27–4.2)
WBC NRBC COR # BLD: 8.34 10*3/MM3 (ref 3.4–10.8)

## 2019-10-25 PROCEDURE — 80081 OBSTETRIC PANEL INC HIV TSTG: CPT

## 2019-10-25 PROCEDURE — 86803 HEPATITIS C AB TEST: CPT

## 2019-10-25 PROCEDURE — 99213 OFFICE O/P EST LOW 20 MIN: CPT | Performed by: OBSTETRICS & GYNECOLOGY

## 2019-10-25 PROCEDURE — 83036 HEMOGLOBIN GLYCOSYLATED A1C: CPT

## 2019-10-25 PROCEDURE — 36415 COLL VENOUS BLD VENIPUNCTURE: CPT

## 2019-10-25 PROCEDURE — 84702 CHORIONIC GONADOTROPIN TEST: CPT | Performed by: OBSTETRICS & GYNECOLOGY

## 2019-10-25 PROCEDURE — 84144 ASSAY OF PROGESTERONE: CPT | Performed by: OBSTETRICS & GYNECOLOGY

## 2019-10-25 PROCEDURE — 84443 ASSAY THYROID STIM HORMONE: CPT

## 2019-10-25 RX ORDER — PROMETHAZINE HYDROCHLORIDE 25 MG/1
25 TABLET ORAL EVERY 6 HOURS PRN
Qty: 30 TABLET | Refills: 3 | Status: SHIPPED | OUTPATIENT
Start: 2019-10-25 | End: 2020-05-01

## 2019-10-25 NOTE — PROGRESS NOTES
No results found for: ABORH, LABANTI, ABID    Chief Complaint   Patient presents with   • Routine Prenatal Visit     ob visit with follow up ultrasound    • Morning Sickness     increased nausea and vomiting       HPI: Glen is a  currently at Unknown who today reports the following: Nausea-  YES; Contractions: No,   Vaginal bleeding- No; Heartburn- No    Today Glen complains of nausea with vomiting daily  See ob flowsheet for physical exam.    Review of Systems - Negative except for vomiting    Prenatal Vitals  BP: 110/66  Weight: 64 kg (141 lb)  Urine Glucose/Protein  Urine Glucose: Negative  Urine Protein: Negative  Vaginal Drainage  Draining Fluid: No  Edema  LLE Edema: None  RLE Edema: None  Facial Edema: None     Tests done today: U/S for dating  At the time of the next visit, she will need to have none    Impression:   Encounter Diagnoses   Name Primary?   • Prenatal care, subsequent pregnancy, first trimester Yes   • Unsure of LMP (last menstrual period) as reason for ultrasound scan    • Nausea and vomiting during pregnancy prior to 22 weeks gestation    • Pregnancy with inconclusive fetal viability, single or unspecified fetus        Plan: Return in 1 weeks,    This note was electronically signed.    Merrick Barry MD

## 2019-10-26 LAB
RPR SER QL: NORMAL
RUBV IGG SERPL IA-ACNC: POSITIVE

## 2019-11-01 ENCOUNTER — ROUTINE PRENATAL (OUTPATIENT)
Dept: OBSTETRICS AND GYNECOLOGY | Facility: CLINIC | Age: 24
End: 2019-11-01

## 2019-11-01 VITALS — SYSTOLIC BLOOD PRESSURE: 112 MMHG | BODY MASS INDEX: 21.96 KG/M2 | WEIGHT: 136 LBS | DIASTOLIC BLOOD PRESSURE: 72 MMHG

## 2019-11-01 DIAGNOSIS — Z34.81 PRENATAL CARE, SUBSEQUENT PREGNANCY, FIRST TRIMESTER: Primary | ICD-10-CM

## 2019-11-01 DIAGNOSIS — O21.9 NAUSEA AND VOMITING DURING PREGNANCY PRIOR TO 22 WEEKS GESTATION: ICD-10-CM

## 2019-11-01 PROCEDURE — 99212 OFFICE O/P EST SF 10 MIN: CPT | Performed by: OBSTETRICS & GYNECOLOGY

## 2019-11-15 ENCOUNTER — ROUTINE PRENATAL (OUTPATIENT)
Dept: OBSTETRICS AND GYNECOLOGY | Facility: CLINIC | Age: 24
End: 2019-11-15

## 2019-11-15 VITALS — SYSTOLIC BLOOD PRESSURE: 104 MMHG | BODY MASS INDEX: 23.03 KG/M2 | WEIGHT: 142.6 LBS | DIASTOLIC BLOOD PRESSURE: 64 MMHG

## 2019-11-15 DIAGNOSIS — Z34.81 PRENATAL CARE, SUBSEQUENT PREGNANCY, FIRST TRIMESTER: Primary | ICD-10-CM

## 2019-11-15 DIAGNOSIS — O21.9 NAUSEA AND VOMITING DURING PREGNANCY PRIOR TO 22 WEEKS GESTATION: ICD-10-CM

## 2019-11-15 PROCEDURE — 99212 OFFICE O/P EST SF 10 MIN: CPT | Performed by: OBSTETRICS & GYNECOLOGY

## 2019-11-15 NOTE — PROGRESS NOTES
No results found for: ABORH, LABANTI, ABID    Chief Complaint   Patient presents with   • Routine Prenatal Visit     No complaints       HPI: Glen is a  currently at 8w4d who today reports the following: Nausea-  No; Contractions: No,   Vaginal bleeding- No; Heartburn- No    Today Glen has no specific complaints  See ob flowsheet for physical exam.    Review of Systems - Negative     Prenatal Assessment  Fetal Heart Rate: 138  Prenatal Vitals  BP: 104/64  Weight: 64.7 kg (142 lb 9.6 oz)  Urine Glucose/Protein  Urine Glucose: Negative  Urine Protein: Negative  Vaginal Drainage  Draining Fluid: No  Edema  LLE Edema: None  RLE Edema: None  Facial Edema: None     Tests done today: none  At the time of the next visit, she will need to have none    Impression:   Encounter Diagnoses   Name Primary?   • Prenatal care, subsequent pregnancy, first trimester Yes   • Nausea and vomiting during pregnancy prior to 22 weeks gestation        Plan: Return in 3 weeks    This note was electronically signed.    Merrick Barry MD

## 2019-12-06 ENCOUNTER — LAB REQUISITION (OUTPATIENT)
Dept: LAB | Facility: HOSPITAL | Age: 24
End: 2019-12-06

## 2019-12-06 ENCOUNTER — ROUTINE PRENATAL (OUTPATIENT)
Dept: OBSTETRICS AND GYNECOLOGY | Facility: CLINIC | Age: 24
End: 2019-12-06

## 2019-12-06 VITALS — BODY MASS INDEX: 23 KG/M2 | DIASTOLIC BLOOD PRESSURE: 66 MMHG | WEIGHT: 142.4 LBS | SYSTOLIC BLOOD PRESSURE: 104 MMHG

## 2019-12-06 DIAGNOSIS — Z34.81 PRENATAL CARE, SUBSEQUENT PREGNANCY, FIRST TRIMESTER: Primary | ICD-10-CM

## 2019-12-06 DIAGNOSIS — Z00.00 ROUTINE GENERAL MEDICAL EXAMINATION AT A HEALTH CARE FACILITY: ICD-10-CM

## 2019-12-06 LAB — EXTERNAL NIPT: NORMAL

## 2019-12-06 PROCEDURE — 36415 COLL VENOUS BLD VENIPUNCTURE: CPT

## 2019-12-06 PROCEDURE — 99212 OFFICE O/P EST SF 10 MIN: CPT | Performed by: OBSTETRICS & GYNECOLOGY

## 2019-12-06 NOTE — PROGRESS NOTES
No results found for: ABORH, LABANTI, ABID    Chief Complaint   Patient presents with   • Routine Prenatal Visit     No complaints       HPI: Glen is a  currently at 11w4d who today reports the following: Nausea-  YES; Contractions: No,   Vaginal bleeding- No; Heartburn- No    Today Glen complains of nausea without vomiting   See ob flowsheet for physical exam.    Review of Systems - Negative except for present illness    Prenatal Assessment  Fetal Heart Rate: 162  Prenatal Vitals  BP: 104/66  Weight: 64.6 kg (142 lb 6.4 oz)  Vaginal Drainage  Draining Fluid: No  Edema  LLE Edema: None  RLE Edema: None  Facial Edema: None     Tests done today: cell free DNA  At the time of the next visit, she will need to have none    Impression:   Encounter Diagnoses   Name Primary?   • Prenatal care, subsequent pregnancy, first trimester Yes       Plan: Return in 4 weeks    This note was electronically signed.    Merrick Barry MD

## 2020-01-02 ENCOUNTER — TELEPHONE (OUTPATIENT)
Dept: OBSTETRICS AND GYNECOLOGY | Facility: CLINIC | Age: 25
End: 2020-01-02

## 2020-01-02 NOTE — TELEPHONE ENCOUNTER
Patient returned to telephone call and was told the results of the panorama testing.    Merrick Barry MD

## 2020-01-03 ENCOUNTER — ROUTINE PRENATAL (OUTPATIENT)
Dept: OBSTETRICS AND GYNECOLOGY | Facility: CLINIC | Age: 25
End: 2020-01-03

## 2020-01-03 VITALS — DIASTOLIC BLOOD PRESSURE: 62 MMHG | WEIGHT: 145.6 LBS | SYSTOLIC BLOOD PRESSURE: 100 MMHG | BODY MASS INDEX: 23.51 KG/M2

## 2020-01-03 DIAGNOSIS — R51.9 HEADACHE IN PREGNANCY, ANTEPARTUM, SECOND TRIMESTER: ICD-10-CM

## 2020-01-03 DIAGNOSIS — Z34.81 PRENATAL CARE, SUBSEQUENT PREGNANCY, FIRST TRIMESTER: Primary | ICD-10-CM

## 2020-01-03 DIAGNOSIS — O26.892 HEADACHE IN PREGNANCY, ANTEPARTUM, SECOND TRIMESTER: ICD-10-CM

## 2020-01-03 DIAGNOSIS — Z36.3 ENCOUNTER FOR ANTENATAL SCREENING FOR MALFORMATION USING ULTRASOUND: ICD-10-CM

## 2020-01-03 LAB
GLUCOSE UR STRIP-MCNC: NEGATIVE MG/DL
PROT UR STRIP-MCNC: NEGATIVE MG/DL

## 2020-01-03 PROCEDURE — 99213 OFFICE O/P EST LOW 20 MIN: CPT | Performed by: OBSTETRICS & GYNECOLOGY

## 2020-01-03 NOTE — PROGRESS NOTES
No results found for: ABORH, LABANTI, ABID    Chief Complaint   Patient presents with   • Routine Prenatal Visit     No complaints       HPI: Glen is a  currently at 15w4d who today reports the following: Nausea-  No; Contractions: No,   Vaginal bleeding- No; Heartburn- No    Today Glen has no specific complaints except headaches   See ob flowsheet for physical exam.    Review of Systems - Negative except headaches     Prenatal Assessment  Fetal Heart Rate: 146  Movement: Present  Prenatal Vitals  BP: 100/62  Weight: 66 kg (145 lb 9.6 oz)  Vaginal Drainage  Draining Fluid: No  Edema  LLE Edema: None  RLE Edema: None  Facial Edema: None     Tests done today: none  At the time of the next visit, she will need to have U/S for anatomic screening - at [facility]    Impression:   Encounter Diagnoses   Name Primary?   • Prenatal care, subsequent pregnancy, first trimester Yes   • Headache in pregnancy, antepartum, second trimester    • Encounter for  screening for malformation using ultrasound        Plan: Return in 4 weeks, Tylenol for headaches, may take Motrin if needed    This note was electronically signed.    Merrick Barry MD

## 2020-01-31 ENCOUNTER — OFFICE VISIT (OUTPATIENT)
Dept: OBSTETRICS AND GYNECOLOGY | Facility: HOSPITAL | Age: 25
End: 2020-01-31

## 2020-01-31 ENCOUNTER — HOSPITAL ENCOUNTER (OUTPATIENT)
Dept: WOMENS IMAGING | Facility: HOSPITAL | Age: 25
Discharge: HOME | End: 2020-01-31
Admitting: OBSTETRICS & GYNECOLOGY

## 2020-01-31 VITALS
BODY MASS INDEX: 24.62 KG/M2 | DIASTOLIC BLOOD PRESSURE: 66 MMHG | SYSTOLIC BLOOD PRESSURE: 111 MMHG | HEIGHT: 64 IN | WEIGHT: 144.2 LBS

## 2020-01-31 DIAGNOSIS — Z34.90 PREGNANCY, UNSPECIFIED GESTATIONAL AGE: Primary | ICD-10-CM

## 2020-01-31 DIAGNOSIS — Z82.79 FAMILY HISTORY OF DOWN SYNDROME: ICD-10-CM

## 2020-01-31 DIAGNOSIS — O35.BXX0 ECHOGENIC FOCUS OF HEART OF FETUS AFFECTING ANTEPARTUM CARE OF MOTHER, SINGLE OR UNSPECIFIED FETUS: ICD-10-CM

## 2020-01-31 DIAGNOSIS — Z36.3 ENCOUNTER FOR ANTENATAL SCREENING FOR MALFORMATION USING ULTRASOUND: ICD-10-CM

## 2020-01-31 PROCEDURE — 76805 OB US >/= 14 WKS SNGL FETUS: CPT

## 2020-01-31 PROCEDURE — 76805 OB US >/= 14 WKS SNGL FETUS: CPT | Performed by: OBSTETRICS & GYNECOLOGY

## 2020-01-31 PROCEDURE — 99241 PR OFFICE CONSULTATION NEW/ESTAB PATIENT 15 MIN: CPT | Performed by: OBSTETRICS & GYNECOLOGY

## 2020-02-14 ENCOUNTER — ROUTINE PRENATAL (OUTPATIENT)
Dept: OBSTETRICS AND GYNECOLOGY | Facility: CLINIC | Age: 25
End: 2020-02-14

## 2020-02-14 VITALS — SYSTOLIC BLOOD PRESSURE: 102 MMHG | WEIGHT: 152 LBS | BODY MASS INDEX: 25.89 KG/M2 | DIASTOLIC BLOOD PRESSURE: 76 MMHG

## 2020-02-14 DIAGNOSIS — Z34.81 PRENATAL CARE, SUBSEQUENT PREGNANCY, FIRST TRIMESTER: Primary | ICD-10-CM

## 2020-02-14 PROCEDURE — 99212 OFFICE O/P EST SF 10 MIN: CPT | Performed by: OBSTETRICS & GYNECOLOGY

## 2020-02-14 NOTE — PROGRESS NOTES
No results found for: ABORH, LABANTI, ABID    Chief Complaint   Patient presents with   • Routine Prenatal Visit     No complaints       HPI: Glen is a  currently at 21w4d who today reports the following: Nausea-  No; Contractions: No,   Vaginal bleeding- No; Heartburn- No    Today Glen has no specific complaints  See ob flowsheet for physical exam.    Review of Systems - Negative     Prenatal Assessment  Fetal Heart Rate: 140  Fundal Height (cm): 24 cm  Movement: Present  Prenatal Vitals  BP: 102/76  Weight: 68.9 kg (152 lb)  Vaginal Drainage  Draining Fluid: Yes  Edema  LLE Edema: None  RLE Edema: None  Facial Edema: None     Tests done today: none  At the time of the next visit, she will need to have none    Impression:   Encounter Diagnoses   Name Primary?   • Prenatal care, subsequent pregnancy, first trimester Yes       Plan: Return in 4 weeks    This note was electronically signed.    Merrick Barry MD

## 2020-03-13 ENCOUNTER — ROUTINE PRENATAL (OUTPATIENT)
Dept: OBSTETRICS AND GYNECOLOGY | Facility: CLINIC | Age: 25
End: 2020-03-13

## 2020-03-13 VITALS — BODY MASS INDEX: 26.23 KG/M2 | SYSTOLIC BLOOD PRESSURE: 118 MMHG | WEIGHT: 154 LBS | DIASTOLIC BLOOD PRESSURE: 68 MMHG

## 2020-03-13 DIAGNOSIS — Z34.81 PRENATAL CARE, SUBSEQUENT PREGNANCY, FIRST TRIMESTER: Primary | ICD-10-CM

## 2020-03-13 PROCEDURE — 99212 OFFICE O/P EST SF 10 MIN: CPT | Performed by: OBSTETRICS & GYNECOLOGY

## 2020-03-13 NOTE — PROGRESS NOTES
No results found for: ABORH, LABANTI, ABID    Chief Complaint   Patient presents with   • Routine Prenatal Visit     ob visit, no problems       HPI: Glen is a  currently at 25w4d who today reports the following: Nausea-  No; Contractions: No,   Vaginal bleeding- No; Heartburn- No    Today Glen has no specific complaints  See ob flowsheet for physical exam.    Review of Systems - Negative     Prenatal Assessment  Fetal Heart Rate: 146  Fundal Height (cm): 24 cm  Movement: Present  Prenatal Vitals  BP: 118/68  Weight: 69.9 kg (154 lb)  Vaginal Drainage  Draining Fluid: No  Edema  LLE Edema: None  RLE Edema: None  Facial Edema: None     Tests done today: none  At the time of the next visit, she will need to have GCT    Impression:   Encounter Diagnoses   Name Primary?   • Prenatal care, subsequent pregnancy, first trimester Yes       Plan: Return in 2 weeks    This note was electronically signed.    Merrick Barry MD

## 2020-03-27 ENCOUNTER — LAB (OUTPATIENT)
Dept: LAB | Facility: HOSPITAL | Age: 25
End: 2020-03-27

## 2020-03-27 ENCOUNTER — ROUTINE PRENATAL (OUTPATIENT)
Dept: OBSTETRICS AND GYNECOLOGY | Facility: CLINIC | Age: 25
End: 2020-03-27

## 2020-03-27 VITALS — DIASTOLIC BLOOD PRESSURE: 64 MMHG | BODY MASS INDEX: 26.84 KG/M2 | SYSTOLIC BLOOD PRESSURE: 104 MMHG | WEIGHT: 157.6 LBS

## 2020-03-27 DIAGNOSIS — R10.2 VAGINAL PAIN: ICD-10-CM

## 2020-03-27 DIAGNOSIS — Z34.82 PRENATAL CARE, SUBSEQUENT PREGNANCY, SECOND TRIMESTER: Primary | ICD-10-CM

## 2020-03-27 DIAGNOSIS — Z34.81 PRENATAL CARE, SUBSEQUENT PREGNANCY, FIRST TRIMESTER: ICD-10-CM

## 2020-03-27 LAB
GLUCOSE 1H P 100 G GLC PO SERPL-MCNC: 78 MG/DL (ref 65–140)
GLUCOSE UR STRIP-MCNC: NEGATIVE MG/DL
PROT UR STRIP-MCNC: NEGATIVE MG/DL

## 2020-03-27 PROCEDURE — 99212 OFFICE O/P EST SF 10 MIN: CPT | Performed by: OBSTETRICS & GYNECOLOGY

## 2020-03-27 PROCEDURE — 82950 GLUCOSE TEST: CPT

## 2020-03-27 PROCEDURE — 36415 COLL VENOUS BLD VENIPUNCTURE: CPT

## 2020-03-27 NOTE — PROGRESS NOTES
No results found for: ABORH, LABANTI, ABID    Chief Complaint   Patient presents with   • Routine Prenatal Visit     Patient states she's been feeling a shocking pain in her vagina for the past 2 weeks       HPI: lGen is a  currently at 27w4d who today reports the following: Nausea-  No; Contractions: No,   Vaginal bleeding- No; Heartburn- No    Today Glen complains of vaginal pain  See ob flowsheet for physical exam.    Review of Systems - Negative except for present illness     Prenatal Assessment  Fetal Heart Rate: 152  Movement: Present  Prenatal Vitals  BP: 104/64  Weight: 71.5 kg (157 lb 9.6 oz)  Vaginal Drainage  Draining Fluid: No  Edema  LLE Edema: None  RLE Edema: None  Facial Edema: None     Tests done today: GCT  At the time of the next visit, she will need to have none    Impression:   Encounter Diagnoses   Name Primary?   • Prenatal care, subsequent pregnancy, second trimester Yes   • Vaginal pain        Plan: Return in 3 weeks    This note was electronically signed.    Merrick Barry MD

## 2020-03-28 ENCOUNTER — TELEPHONE (OUTPATIENT)
Dept: OBSTETRICS AND GYNECOLOGY | Facility: CLINIC | Age: 25
End: 2020-03-28

## 2020-04-17 ENCOUNTER — ROUTINE PRENATAL (OUTPATIENT)
Dept: OBSTETRICS AND GYNECOLOGY | Facility: CLINIC | Age: 25
End: 2020-04-17

## 2020-04-17 VITALS — DIASTOLIC BLOOD PRESSURE: 64 MMHG | BODY MASS INDEX: 27.15 KG/M2 | WEIGHT: 159.4 LBS | SYSTOLIC BLOOD PRESSURE: 100 MMHG

## 2020-04-17 DIAGNOSIS — Z34.83 PRENATAL CARE, SUBSEQUENT PREGNANCY, THIRD TRIMESTER: Primary | ICD-10-CM

## 2020-04-17 PROCEDURE — 99212 OFFICE O/P EST SF 10 MIN: CPT | Performed by: OBSTETRICS & GYNECOLOGY

## 2020-04-17 NOTE — PROGRESS NOTES
No results found for: ABORH, LABANTI, ABID    Chief Complaint   Patient presents with   • Routine Prenatal Visit     No complaints       HPI: Glen is a  currently at 30w4d who today reports the following: Nausea-  No; Contractions: YES,   Vaginal bleeding- No; Heartburn- No    Today Glen complains of irregular contractions   See ob flowsheet for physical exam.    Review of Systems - Negative except for occasional contraction    Prenatal Assessment  Fetal Heart Rate: 147  Movement: Present  Prenatal Vitals  BP: 100/64  Weight: 72.3 kg (159 lb 6.4 oz)  Vaginal Drainage  Draining Fluid: No  Edema  LLE Edema: None  RLE Edema: None  Facial Edema: None     Tests done today: none  At the time of the next visit, she will need to have none    Impression:   Encounter Diagnoses   Name Primary?   • Prenatal care, subsequent pregnancy, third trimester Yes       Plan: Return in 2 weeks    This note was electronically signed.    Merrick Barry MD

## 2020-05-01 ENCOUNTER — APPOINTMENT (OUTPATIENT)
Dept: WOMENS IMAGING | Facility: HOSPITAL | Age: 25
End: 2020-05-01

## 2020-05-01 ENCOUNTER — HOSPITAL ENCOUNTER (OUTPATIENT)
Dept: WOMENS IMAGING | Facility: HOSPITAL | Age: 25
Discharge: HOME | End: 2020-05-01
Admitting: OBSTETRICS & GYNECOLOGY

## 2020-05-01 ENCOUNTER — ROUTINE PRENATAL (OUTPATIENT)
Dept: OBSTETRICS AND GYNECOLOGY | Facility: CLINIC | Age: 25
End: 2020-05-01

## 2020-05-01 ENCOUNTER — OFFICE VISIT (OUTPATIENT)
Dept: OBSTETRICS AND GYNECOLOGY | Facility: HOSPITAL | Age: 25
End: 2020-05-01

## 2020-05-01 VITALS — SYSTOLIC BLOOD PRESSURE: 108 MMHG | WEIGHT: 161.4 LBS | BODY MASS INDEX: 27.49 KG/M2 | DIASTOLIC BLOOD PRESSURE: 65 MMHG

## 2020-05-01 VITALS — WEIGHT: 161 LBS | SYSTOLIC BLOOD PRESSURE: 102 MMHG | BODY MASS INDEX: 27.42 KG/M2 | DIASTOLIC BLOOD PRESSURE: 66 MMHG

## 2020-05-01 DIAGNOSIS — Z82.79 FAMILY HISTORY OF DOWN SYNDROME: ICD-10-CM

## 2020-05-01 DIAGNOSIS — O35.BXX0 ECHOGENIC FOCUS OF HEART OF FETUS AFFECTING ANTEPARTUM CARE OF MOTHER, SINGLE OR UNSPECIFIED FETUS: ICD-10-CM

## 2020-05-01 DIAGNOSIS — Z34.83 PRENATAL CARE, SUBSEQUENT PREGNANCY, THIRD TRIMESTER: Primary | ICD-10-CM

## 2020-05-01 DIAGNOSIS — Z82.79 FAMILY HISTORY OF DOWN SYNDROME: Primary | ICD-10-CM

## 2020-05-01 DIAGNOSIS — Z3A.32 32 WEEKS GESTATION OF PREGNANCY: ICD-10-CM

## 2020-05-01 DIAGNOSIS — Z34.90 PREGNANCY, UNSPECIFIED GESTATIONAL AGE: ICD-10-CM

## 2020-05-01 LAB
GLUCOSE UR STRIP-MCNC: NEGATIVE MG/DL
PROT UR STRIP-MCNC: NEGATIVE MG/DL

## 2020-05-01 PROCEDURE — 76816 OB US FOLLOW-UP PER FETUS: CPT

## 2020-05-01 PROCEDURE — 76816 OB US FOLLOW-UP PER FETUS: CPT | Performed by: OBSTETRICS & GYNECOLOGY

## 2020-05-01 PROCEDURE — 99212 OFFICE O/P EST SF 10 MIN: CPT | Performed by: OBSTETRICS & GYNECOLOGY

## 2020-05-01 NOTE — PROGRESS NOTES
No results found for: ABORH, LABANTI, ABID    Chief Complaint   Patient presents with   • Routine Prenatal Visit     No other complaints       HPI: Glen is a  currently at 32w4d who today reports the following: Nausea-  No; Contractions: No,   Vaginal bleeding- No; Heartburn- No    Today Glen has no specific complaints  See ob flowsheet for physical exam.    Review of Systems - Negative     Prenatal Assessment  Fetal Heart Rate: PDC-140  Movement: Present  Prenatal Vitals  BP: 102/66  Weight: 73 kg (161 lb)  Vaginal Drainage  Draining Fluid: Yes  Edema  LLE Edema: None  RLE Edema: None  Facial Edema: None     Tests done today: U/S for EFW - at PDC  At the time of the next visit, she will need to have none    Impression:   Encounter Diagnoses   Name Primary?   • Prenatal care, subsequent pregnancy, third trimester Yes       Plan: Return in 2 weeks    This note was electronically signed.    Merrick Barry MD

## 2020-05-15 ENCOUNTER — ROUTINE PRENATAL (OUTPATIENT)
Dept: OBSTETRICS AND GYNECOLOGY | Facility: CLINIC | Age: 25
End: 2020-05-15

## 2020-05-15 VITALS — BODY MASS INDEX: 27.9 KG/M2 | WEIGHT: 163.8 LBS | SYSTOLIC BLOOD PRESSURE: 104 MMHG | DIASTOLIC BLOOD PRESSURE: 70 MMHG

## 2020-05-15 DIAGNOSIS — Z34.83 PRENATAL CARE, SUBSEQUENT PREGNANCY, THIRD TRIMESTER: Primary | ICD-10-CM

## 2020-05-15 LAB
GLUCOSE UR STRIP-MCNC: NEGATIVE MG/DL
PROT UR STRIP-MCNC: NEGATIVE MG/DL

## 2020-05-15 PROCEDURE — 99212 OFFICE O/P EST SF 10 MIN: CPT | Performed by: OBSTETRICS & GYNECOLOGY

## 2020-05-15 NOTE — PROGRESS NOTES
No results found for: ABORH, LABANTI, ABID    Chief Complaint   Patient presents with   • Routine Prenatal Visit     No complaints       HPI: Glen is a  currently at 34w4d who today reports the following: Nausea-  YES; Contractions: YES,   Vaginal bleeding- No; Heartburn- YES    Today Glen complains of irregular contractions , heartburn and nausea without vomiting   See ob flowsheet for physical exam.    Review of Systems - Negative except for present illness     Prenatal Assessment  Fetal Heart Rate: 138  Movement: Present  Prenatal Vitals  BP: 104/70  Weight: 74.3 kg (163 lb 12.8 oz)  Vaginal Drainage  Draining Fluid: Yes  Edema  LLE Edema: None  RLE Edema: None  Facial Edema: None     Tests done today: none  At the time of the next visit, she will need to have GBS testing    Impression:   Encounter Diagnoses   Name Primary?   • Prenatal care, subsequent pregnancy, third trimester Yes       Plan: Return in 2 weeks    This note was electronically signed.    Merrick Barry MD

## 2020-05-29 ENCOUNTER — ROUTINE PRENATAL (OUTPATIENT)
Dept: OBSTETRICS AND GYNECOLOGY | Facility: CLINIC | Age: 25
End: 2020-05-29

## 2020-05-29 VITALS — WEIGHT: 165 LBS | BODY MASS INDEX: 28.1 KG/M2 | SYSTOLIC BLOOD PRESSURE: 124 MMHG | DIASTOLIC BLOOD PRESSURE: 78 MMHG

## 2020-05-29 DIAGNOSIS — Z36.85 ANTENATAL SCREENING FOR STREPTOCOCCUS B: ICD-10-CM

## 2020-05-29 DIAGNOSIS — Z34.83 PRENATAL CARE, SUBSEQUENT PREGNANCY, THIRD TRIMESTER: Primary | ICD-10-CM

## 2020-05-29 LAB — GP B STREP RRNA SPEC QL PROBE: NEGATIVE

## 2020-05-29 PROCEDURE — 99213 OFFICE O/P EST LOW 20 MIN: CPT | Performed by: OBSTETRICS & GYNECOLOGY

## 2020-06-05 ENCOUNTER — ROUTINE PRENATAL (OUTPATIENT)
Dept: OBSTETRICS AND GYNECOLOGY | Facility: CLINIC | Age: 25
End: 2020-06-05

## 2020-06-05 VITALS — WEIGHT: 167.6 LBS | BODY MASS INDEX: 28.55 KG/M2 | DIASTOLIC BLOOD PRESSURE: 76 MMHG | SYSTOLIC BLOOD PRESSURE: 106 MMHG

## 2020-06-05 DIAGNOSIS — Z34.83 PRENATAL CARE, SUBSEQUENT PREGNANCY, THIRD TRIMESTER: Primary | ICD-10-CM

## 2020-06-05 PROCEDURE — 99212 OFFICE O/P EST SF 10 MIN: CPT | Performed by: OBSTETRICS & GYNECOLOGY

## 2020-06-05 NOTE — PROGRESS NOTES
No results found for: ABORH, LABANTI, ABID    Chief Complaint   Patient presents with   • Routine Prenatal Visit     Having contractions but they're sporadic       HPI: Glen is a  currently at 37w4d who today reports the following: Nausea-  No; Contractions: YES,   Vaginal bleeding- YES; Heartburn- YES    Today Glen complains of irregular contractions , heartburn and vaginal bleeding  See ob flowsheet for physical exam.    Review of Systems - Negative except for present illness     Prenatal Assessment  Fetal Heart Rate: 143  Movement: Present  Presentation: Vertex  Prenatal Vitals  BP: 106/76  Weight: 76 kg (167 lb 9.6 oz)  Dilation/Effacement/Station  Dilation: 1  Effacement (%): 30  Station: -3  Vaginal Drainage  Draining Fluid: Yes  Edema  LLE Edema: Mild pitting, slight indentation  RLE Edema: Mild pitting, slight indentation  Facial Edema: None     Tests done today: none  At the time of the next visit, she will need to have none    Impression:   Encounter Diagnoses   Name Primary?   • Prenatal care, subsequent pregnancy, third trimester Yes       Plan: Return in 1 week, induction at 39 weeks    This note was electronically signed.    Merrick Barry MD

## 2020-06-11 ENCOUNTER — TELEPHONE (OUTPATIENT)
Dept: OBSTETRICS AND GYNECOLOGY | Facility: CLINIC | Age: 25
End: 2020-06-11

## 2020-06-11 ENCOUNTER — HOSPITAL ENCOUNTER (INPATIENT)
Facility: HOSPITAL | Age: 25
LOS: 2 days | Discharge: HOME | End: 2020-06-13
Attending: OBSTETRICS & GYNECOLOGY | Admitting: OBSTETRICS & GYNECOLOGY

## 2020-06-11 PROBLEM — Z37.9 NORMAL LABOR: Status: ACTIVE | Noted: 2020-06-11

## 2020-06-11 LAB
ABO GROUP BLD: NORMAL
ALP SERPL-CCNC: 129 U/L (ref 39–117)
ALT SERPL W P-5'-P-CCNC: 12 U/L (ref 1–33)
AMPHET+METHAMPHET UR QL: NEGATIVE
AMPHETAMINES UR QL: NEGATIVE
AST SERPL-CCNC: 17 U/L (ref 1–32)
BARBITURATES UR QL SCN: NEGATIVE
BENZODIAZ UR QL SCN: NEGATIVE
BILIRUB SERPL-MCNC: 0.2 MG/DL (ref 0.2–1.2)
BLD GP AB SCN SERPL QL: NEGATIVE
BUPRENORPHINE SERPL-MCNC: NEGATIVE NG/ML
CANNABINOIDS SERPL QL: NEGATIVE
COCAINE UR QL: NEGATIVE
CREAT BLD-MCNC: 0.46 MG/DL (ref 0.57–1)
DEPRECATED RDW RBC AUTO: 39.7 FL (ref 37–54)
ERYTHROCYTE [DISTWIDTH] IN BLOOD BY AUTOMATED COUNT: 12.1 % (ref 12.3–15.4)
HCT VFR BLD AUTO: 34 % (ref 34–46.6)
HGB BLD-MCNC: 11.3 G/DL (ref 12–15.9)
LDH SERPL-CCNC: 187 U/L (ref 135–214)
MCH RBC QN AUTO: 30 PG (ref 26.6–33)
MCHC RBC AUTO-ENTMCNC: 33.2 G/DL (ref 31.5–35.7)
MCV RBC AUTO: 90.2 FL (ref 79–97)
METHADONE UR QL SCN: NEGATIVE
OPIATES UR QL: NEGATIVE
OXYCODONE UR QL SCN: NEGATIVE
PCP UR QL SCN: NEGATIVE
PLATELET # BLD AUTO: 328 10*3/MM3 (ref 140–450)
PMV BLD AUTO: 10.6 FL (ref 6–12)
PROPOXYPH UR QL: NEGATIVE
RBC # BLD AUTO: 3.77 10*6/MM3 (ref 3.77–5.28)
RH BLD: POSITIVE
T&S EXPIRATION DATE: NORMAL
TRICYCLICS UR QL SCN: NEGATIVE
URATE SERPL-MCNC: 4.4 MG/DL (ref 2.4–5.7)
WBC NRBC COR # BLD: 11.38 10*3/MM3 (ref 3.4–10.8)

## 2020-06-11 PROCEDURE — 86850 RBC ANTIBODY SCREEN: CPT | Performed by: OBSTETRICS & GYNECOLOGY

## 2020-06-11 PROCEDURE — S0260 H&P FOR SURGERY: HCPCS | Performed by: OBSTETRICS & GYNECOLOGY

## 2020-06-11 PROCEDURE — 83615 LACTATE (LD) (LDH) ENZYME: CPT | Performed by: OBSTETRICS & GYNECOLOGY

## 2020-06-11 PROCEDURE — 82247 BILIRUBIN TOTAL: CPT | Performed by: OBSTETRICS & GYNECOLOGY

## 2020-06-11 PROCEDURE — 84450 TRANSFERASE (AST) (SGOT): CPT | Performed by: OBSTETRICS & GYNECOLOGY

## 2020-06-11 PROCEDURE — 84112 EVAL AMNIOTIC FLUID PROTEIN: CPT | Performed by: OBSTETRICS & GYNECOLOGY

## 2020-06-11 PROCEDURE — 59025 FETAL NON-STRESS TEST: CPT

## 2020-06-11 PROCEDURE — 84075 ASSAY ALKALINE PHOSPHATASE: CPT | Performed by: OBSTETRICS & GYNECOLOGY

## 2020-06-11 PROCEDURE — 82565 ASSAY OF CREATININE: CPT | Performed by: OBSTETRICS & GYNECOLOGY

## 2020-06-11 PROCEDURE — 84460 ALANINE AMINO (ALT) (SGPT): CPT | Performed by: OBSTETRICS & GYNECOLOGY

## 2020-06-11 PROCEDURE — 85027 COMPLETE CBC AUTOMATED: CPT | Performed by: OBSTETRICS & GYNECOLOGY

## 2020-06-11 PROCEDURE — 84550 ASSAY OF BLOOD/URIC ACID: CPT | Performed by: OBSTETRICS & GYNECOLOGY

## 2020-06-11 PROCEDURE — 80306 DRUG TEST PRSMV INSTRMNT: CPT | Performed by: OBSTETRICS & GYNECOLOGY

## 2020-06-11 PROCEDURE — 86901 BLOOD TYPING SEROLOGIC RH(D): CPT | Performed by: OBSTETRICS & GYNECOLOGY

## 2020-06-11 PROCEDURE — 86900 BLOOD TYPING SEROLOGIC ABO: CPT | Performed by: OBSTETRICS & GYNECOLOGY

## 2020-06-11 RX ORDER — SODIUM CHLORIDE, SODIUM LACTATE, POTASSIUM CHLORIDE, CALCIUM CHLORIDE 600; 310; 30; 20 MG/100ML; MG/100ML; MG/100ML; MG/100ML
125 INJECTION, SOLUTION INTRAVENOUS CONTINUOUS
Status: DISCONTINUED | OUTPATIENT
Start: 2020-06-11 | End: 2020-06-13 | Stop reason: HOSPADM

## 2020-06-11 RX ORDER — MAGNESIUM CARB/ALUMINUM HYDROX 105-160MG
30 TABLET,CHEWABLE ORAL ONCE
Status: DISCONTINUED | OUTPATIENT
Start: 2020-06-11 | End: 2020-06-12 | Stop reason: HOSPADM

## 2020-06-11 RX ORDER — SODIUM CHLORIDE 0.9 % (FLUSH) 0.9 %
10 SYRINGE (ML) INJECTION AS NEEDED
Status: DISCONTINUED | OUTPATIENT
Start: 2020-06-11 | End: 2020-06-12 | Stop reason: HOSPADM

## 2020-06-11 RX ORDER — OXYTOCIN-SODIUM CHLORIDE 0.9% IV SOLN 30 UNIT/500ML 30-0.9/5 UT/ML-%
2-20 SOLUTION INTRAVENOUS
Status: DISCONTINUED | OUTPATIENT
Start: 2020-06-12 | End: 2020-06-12 | Stop reason: HOSPADM

## 2020-06-11 RX ORDER — LIDOCAINE HYDROCHLORIDE 10 MG/ML
5 INJECTION, SOLUTION EPIDURAL; INFILTRATION; INTRACAUDAL; PERINEURAL AS NEEDED
Status: DISCONTINUED | OUTPATIENT
Start: 2020-06-11 | End: 2020-06-12 | Stop reason: HOSPADM

## 2020-06-11 RX ORDER — SODIUM CHLORIDE 0.9 % (FLUSH) 0.9 %
3 SYRINGE (ML) INJECTION EVERY 12 HOURS SCHEDULED
Status: DISCONTINUED | OUTPATIENT
Start: 2020-06-11 | End: 2020-06-12 | Stop reason: HOSPADM

## 2020-06-11 RX ADMIN — SODIUM CHLORIDE, POTASSIUM CHLORIDE, SODIUM LACTATE AND CALCIUM CHLORIDE 125 ML/HR: 600; 310; 30; 20 INJECTION, SOLUTION INTRAVENOUS at 23:55

## 2020-06-11 RX ADMIN — OXYTOCIN 2 MILLI-UNITS/MIN: 10 INJECTION, SOLUTION INTRAMUSCULAR; INTRAVENOUS at 23:55

## 2020-06-11 NOTE — PLAN OF CARE
Problem: Patient Care Overview  Goal: Plan of Care Review  Outcome: Ongoing (interventions implemented as appropriate)  Flowsheets  Taken 6/11/2020 1918  Progress: improving  Taken 6/11/2020 1736  Plan of Care Reviewed With: patient  Goal: Individualization and Mutuality  Outcome: Ongoing (interventions implemented as appropriate)  Flowsheets (Taken 6/11/2020 1918)  Patient Specific Goals (Include Timeframe): Healthy mom, healthy baby by the weekend  Patient Specific Preferences: Mirror while pushing  Goal: Discharge Needs Assessment  Outcome: Ongoing (interventions implemented as appropriate)  Flowsheets (Taken 6/11/2020 1918)  Equipment Needed After Discharge: none  Equipment Currently Used at Home: none  Anticipated Changes Related to Illness: none  Transportation Anticipated: family or friend will provide  Transportation Concerns: car, none  Patient/Family Anticipated Services at Transition: none  Patient/Family Anticipates Transition to: home with family  Goal: Interprofessional Rounds/Family Conf  Outcome: Ongoing (interventions implemented as appropriate)     Problem: Labor (Cervical Ripen, Induct, Augment) (Adult,Obstetrics,Pediatric)  Goal: Signs and Symptoms of Listed Potential Problems Will be Absent, Minimized or Managed (Labor)  Outcome: Ongoing (interventions implemented as appropriate)

## 2020-06-11 NOTE — H&P
[facility]  Obstetric History and Physical    Referring Provider: Merrick Barry MD      CC: Rupture membranes    Subjective     Patient is a 25 y.o. female  currently at 38w3d, who presents with c/o rupture membranes.  She reports a gush of clear fluid earlier today with any leaking of fluid.  Patient states irregular mild contractions.  She reports normal fetal activity.  Patient denies any shortness of breath, chest pain, fever, any other associated symptoms or concerns.  Prenatal care by Dr. Barry without complications to date.  GBS negative    .    The following portions of the patients history were reviewed and updated as appropriate: current medications, allergies, past medical history, past surgical history, past family history, past social history and problem list .       Prenatal Information:   Maternal Prenatal Labs  Blood Type No results found for: ABO   Rh Status No results found for: RH   Antibody Screen No results found for: ABSCRN   Gonnorhea No results found for: GCCX   Chlamydia No results found for: CLAMYDCU   RPR No results found for: RPR   Syphilis Antibody No results found for: SYPHILIS   Rubella No results found for: RUBELLAIGGIN   Hepatitis B Surface Antigen No results found for: HEPBSAG   HIV-1 Antibody No results found for: LABHIV1   Hepatitis C Antibody No results found for: HEPCAB   Rapid Urin Drug Screen No results found for: AMPMETHU, BARBITSCNUR, LABBENZSCN, LABMETHSCN, LABOPIASCN, THCURSCR, COCAINEUR, AMPHETSCREEN, PROPOXSCN, BUPRENORSCNU, METAMPSCNUR, OXYCODONESCN, TRICYCLICSCN   Group B Strep Culture No results found for: GBSANTIGEN           External Prenatal Results     Pregnancy Outside Results - Transcribed From Office Records - See Scanned Records For Details     Test Value Date Time    Hgb 13.1 g/dL 10/25/19 0902    Hct 37.8 % 10/25/19 0902    ABO AB  10/25/19 0902    Rh Positive  10/25/19 0902    Antibody Screen Negative  10/25/19 0902    Glucose Fasting GTT        Glucose Tolerance Test 1 hour 73  17     Glucose Tolerance Test 3 hour       Gonorrhea (discrete) Negative  17     Chlamydia (discrete) Negative  17     RPR Non-Reactive  10/25/19 09    VDRL       Syphilis Antibody       Rubella Positive  10/25/19 09    HBsAg Non-Reactive  10/25/19 09    Herpes Simplex Virus PCR       Herpes Simplex VIrus Culture       HIV Non-Reactive  10/25/19 09    Hep C RNA Quant PCR       Hep C Antibody Non-Reactive  10/25/19 09    AFP       Group B Strep Negative  20     GBS Susceptibility to Clindamycin       GBS Susceptibility to Erythromycin       Fetal Fibronectin       Genetic Testing, Maternal Blood             Drug Screening     Test Value Date Time    Urine Drug Screen       Amphetamine Screen Negative  17    Barbiturate Screen Negative  17 010    Benzodiazepine Screen Negative  17 010    Methadone Screen Negative  17 010    Phencyclidine Screen Negative  17 010    Opiates Screen Negative  17 010    THC Screen Negative  17 010    Cocaine Screen       Propoxyphene Screen Negative  17 0105    Buprenorphine Screen Negative  17 0105    Methamphetamine Screen       Oxycodone Screen Negative  17 0105    Tricyclic Antidepressants Screen Negative  17 0105                  Past OB History:       OB History    Para Term  AB Living   2 1 1 0 0 1   SAB TAB Ectopic Molar Multiple Live Births   0 0 0 0 0 1      # Outcome Date GA Lbr Kyle/2nd Weight Sex Delivery Anes PTL Lv   2 Current            1 Term 17 38w3d / 00:22 3280 g (7 lb 3.7 oz) F Vag-Spont EPI N BOBBY      Name: RBADLEY MORA      Apgar1: 9  Apgar5: 9      Obstetric Comments   FOB #1 : Pregnancy #1-2   2017-Rain       Past Medical History: Past Medical History:   Diagnosis Date   • Anxiety     stopped med in 2019   • Chlamydia     treated for chlamydia in beginning of this pregnancy      Past Surgical  History Past Surgical History:   Procedure Laterality Date   • APPENDECTOMY  2005      Family History: Family History   Problem Relation Age of Onset   • Hypertension Mother    • Hypertension Father    • Heart disease Maternal Grandmother    • Diabetes Maternal Grandmother    • Heart disease Maternal Grandfather    • Hypertension Paternal Grandmother    • Hypertension Paternal Grandfather       Social History:  reports that she has never smoked. She has never used smokeless tobacco.   reports that she does not drink alcohol.   reports that she does not use drugs.                   General ROS Negative Findings:Headaches, Visual Changes, Epigastric pain, Anorexia, Nausia/Vomiting and Vaginal Bleeding    ROS     All other systems have been reviewed and are neg  Objective       Vital Signs Range for the last 24 hours  Temperature: Temp:  [98.6 °F (37 °C)] 98.6 °F (37 °C)   Temp Source: Temp src: Oral   BP: BP: (118)/(69) 118/69   Pulse: Heart Rate:  [105] 105   Respirations: Resp:  [18] 18   SPO2:     O2 Amount (l/min):     O2 Devices     Weight: Weight:  [76.2 kg (168 lb)] 76.2 kg (168 lb)     Physical Examination:   General:   alert, appears stated age and cooperative   Skin:   normal   HEENT:  Clear clear   Lungs:   clear to auscultation bilaterally   Heart:   regular rate and rhythm, S1, S2 normal, no murmur, click, rub or gallop   Abdomen:  Soft, gravid uterus, no guarding, rebound benign exam   Lower Extremities  edema, no calf transfer range of motion   Pelvis:  External genitalia: normal general appearance  Uterus: enlarged         Presentation: vtx   Cervix: Exam by: Method: sterile exam per physician   Dilation:  2+   Effacement: Cervical Effacement: 70%   Station:  -2       Fetal Heart Rate Assessment   Method: Fetal HR Assessment Method: external   Beats/min: Fetal HR (beats/min): 135   Baseline: Fetal Heart Baseline Rate: normal range   Varibility: Fetal HR Variability: moderate (amplitude range 6 to 25  bpm)   Accels: Fetal HR Accelerations: greater than/equal to 15 bpm, lasting at least 15 seconds   Decels: Fetal HR Decelerations: absent   Tracing Category:       Uterine Assessment   Method: Method: external tocotransducer   Frequency (min): Contraction Frequency (Minutes): x1   Ctx Count in 10 min:     Duration:     Intensity: Contraction Intensity: mild by palpation   Intensity by IUPC:     Resting Tone: Uterine Resting Tone: soft by palpation   Resting Tone by IUPC:     [place] Units:       Laboratory Results:   Lab Results (last 24 hours)     ** No results found for the last 24 hours. **        Radiology Review:   Imaging Results (Last 24 Hours)     ** No results found for the last 24 hours. **        Other Studies:    Assessment/Plan       Pregnancy    Normal labor        Assessment:  1.  Intrauterine pregnancy at 38w3d weeks gestation with reactive fetal status.    2.  Early labor with rupture membranes  (positive AmniSure)  3.  GBS negative  4.      Plan:  1. fetal and uterine monitoring  continuously, labor augmentation  Pitocin and analgesia with  epidural  2. Plan of care has been reviewed with patient.  3.  Risks, benefits of treatment plan have been discussed.  4.  All questions have been answered.  5   Dr. Barry at bedside discussed with patient findings and recommendations      Nick Beltre DO  6/11/2020  16:32

## 2020-06-11 NOTE — TELEPHONE ENCOUNTER
DELATORRE PT HAVING A LOT OF WATERING DISCHARGE SOME CONTRACTS BUT NOT HEAVY STARTED THIS MORNING. THE DISCHARGE IS RUNNING DOWN HER LEG. SPOKE TO NANDINI HE TOLD PT TO GO TO L/D

## 2020-06-12 ENCOUNTER — ANESTHESIA EVENT (OUTPATIENT)
Dept: LABOR AND DELIVERY | Facility: HOSPITAL | Age: 25
End: 2020-06-12

## 2020-06-12 ENCOUNTER — ANESTHESIA (OUTPATIENT)
Dept: LABOR AND DELIVERY | Facility: HOSPITAL | Age: 25
End: 2020-06-12

## 2020-06-12 PROBLEM — Z37.9 NORMAL LABOR: Status: RESOLVED | Noted: 2020-06-11 | Resolved: 2020-06-12

## 2020-06-12 PROBLEM — Z34.90 PREGNANCY: Status: RESOLVED | Noted: 2017-10-14 | Resolved: 2020-06-12

## 2020-06-12 PROBLEM — Z82.79 FAMILY HISTORY OF DOWN SYNDROME: Status: RESOLVED | Noted: 2020-01-31 | Resolved: 2020-06-12

## 2020-06-12 PROBLEM — O35.BXX0 ECHOGENIC FOCUS OF HEART OF FETUS AFFECTING ANTEPARTUM CARE OF MOTHER: Status: RESOLVED | Noted: 2020-01-31 | Resolved: 2020-06-12

## 2020-06-12 LAB — POC AMNISURE: POSITIVE

## 2020-06-12 PROCEDURE — 25010000002 ROPIVACAINE PER 1 MG: Performed by: NURSE ANESTHETIST, CERTIFIED REGISTERED

## 2020-06-12 PROCEDURE — 4A1HX4Z MONITORING OF PRODUCTS OF CONCEPTION, CARDIAC ELECTRICAL ACTIVITY, EXTERNAL APPROACH: ICD-10-PCS | Performed by: OBSTETRICS & GYNECOLOGY

## 2020-06-12 PROCEDURE — C1755 CATHETER, INTRASPINAL: HCPCS | Performed by: ANESTHESIOLOGY

## 2020-06-12 PROCEDURE — C1755 CATHETER, INTRASPINAL: HCPCS

## 2020-06-12 PROCEDURE — 25010000002 FENTANYL CITRATE (PF) 100 MCG/2ML SOLUTION: Performed by: NURSE ANESTHETIST, CERTIFIED REGISTERED

## 2020-06-12 PROCEDURE — 59410 OBSTETRICAL CARE: CPT | Performed by: OBSTETRICS & GYNECOLOGY

## 2020-06-12 PROCEDURE — 51703 INSERT BLADDER CATH COMPLEX: CPT

## 2020-06-12 RX ORDER — ZOLPIDEM TARTRATE 5 MG/1
5 TABLET ORAL NIGHTLY PRN
Status: DISCONTINUED | OUTPATIENT
Start: 2020-06-12 | End: 2020-06-13 | Stop reason: HOSPADM

## 2020-06-12 RX ORDER — MISOPROSTOL 200 UG/1
800 TABLET ORAL AS NEEDED
Status: DISCONTINUED | OUTPATIENT
Start: 2020-06-12 | End: 2020-06-12 | Stop reason: HOSPADM

## 2020-06-12 RX ORDER — IBUPROFEN 600 MG/1
600 TABLET ORAL EVERY 6 HOURS PRN
Status: DISCONTINUED | OUTPATIENT
Start: 2020-06-12 | End: 2020-06-13 | Stop reason: HOSPADM

## 2020-06-12 RX ORDER — SODIUM CHLORIDE, SODIUM LACTATE, POTASSIUM CHLORIDE, CALCIUM CHLORIDE 600; 310; 30; 20 MG/100ML; MG/100ML; MG/100ML; MG/100ML
125 INJECTION, SOLUTION INTRAVENOUS CONTINUOUS
Status: DISCONTINUED | OUTPATIENT
Start: 2020-06-12 | End: 2020-06-13 | Stop reason: HOSPADM

## 2020-06-12 RX ORDER — LIDOCAINE HYDROCHLORIDE AND EPINEPHRINE 15; 5 MG/ML; UG/ML
INJECTION, SOLUTION EPIDURAL AS NEEDED
Status: DISCONTINUED | OUTPATIENT
Start: 2020-06-12 | End: 2020-06-12 | Stop reason: SURG

## 2020-06-12 RX ORDER — CARBOPROST TROMETHAMINE 250 UG/ML
250 INJECTION, SOLUTION INTRAMUSCULAR AS NEEDED
Status: DISCONTINUED | OUTPATIENT
Start: 2020-06-12 | End: 2020-06-12 | Stop reason: HOSPADM

## 2020-06-12 RX ORDER — EPHEDRINE SULFATE/0.9% NACL/PF 25 MG/5 ML
10 SYRINGE (ML) INTRAVENOUS
Status: DISCONTINUED | OUTPATIENT
Start: 2020-06-12 | End: 2020-06-12 | Stop reason: HOSPADM

## 2020-06-12 RX ORDER — PROMETHAZINE HYDROCHLORIDE 25 MG/1
25 TABLET ORAL EVERY 6 HOURS PRN
Status: DISCONTINUED | OUTPATIENT
Start: 2020-06-12 | End: 2020-06-13 | Stop reason: HOSPADM

## 2020-06-12 RX ORDER — FAMOTIDINE 10 MG/ML
20 INJECTION, SOLUTION INTRAVENOUS ONCE AS NEEDED
Status: DISCONTINUED | OUTPATIENT
Start: 2020-06-12 | End: 2020-06-12 | Stop reason: HOSPADM

## 2020-06-12 RX ORDER — LANOLIN
CREAM (ML) TOPICAL
Status: DISCONTINUED | OUTPATIENT
Start: 2020-06-12 | End: 2020-06-13 | Stop reason: HOSPADM

## 2020-06-12 RX ORDER — OXYTOCIN-SODIUM CHLORIDE 0.9% IV SOLN 30 UNIT/500ML 30-0.9/5 UT/ML-%
85 SOLUTION INTRAVENOUS ONCE
Status: DISCONTINUED | OUTPATIENT
Start: 2020-06-12 | End: 2020-06-12 | Stop reason: HOSPADM

## 2020-06-12 RX ORDER — OXYTOCIN-SODIUM CHLORIDE 0.9% IV SOLN 30 UNIT/500ML 30-0.9/5 UT/ML-%
650 SOLUTION INTRAVENOUS ONCE
Status: DISCONTINUED | OUTPATIENT
Start: 2020-06-12 | End: 2020-06-12 | Stop reason: HOSPADM

## 2020-06-12 RX ORDER — METHYLERGONOVINE MALEATE 0.2 MG/ML
200 INJECTION INTRAVENOUS ONCE AS NEEDED
Status: DISCONTINUED | OUTPATIENT
Start: 2020-06-12 | End: 2020-06-12 | Stop reason: HOSPADM

## 2020-06-12 RX ORDER — FENTANYL CITRATE 50 UG/ML
INJECTION, SOLUTION INTRAMUSCULAR; INTRAVENOUS AS NEEDED
Status: DISCONTINUED | OUTPATIENT
Start: 2020-06-12 | End: 2020-06-12 | Stop reason: SURG

## 2020-06-12 RX ORDER — PRENATAL VIT/IRON FUM/FOLIC AC 27MG-0.8MG
1 TABLET ORAL DAILY
Status: DISCONTINUED | OUTPATIENT
Start: 2020-06-12 | End: 2020-06-13 | Stop reason: HOSPADM

## 2020-06-12 RX ORDER — DIPHENHYDRAMINE HYDROCHLORIDE 50 MG/ML
12.5 INJECTION INTRAMUSCULAR; INTRAVENOUS EVERY 8 HOURS PRN
Status: DISCONTINUED | OUTPATIENT
Start: 2020-06-12 | End: 2020-06-12 | Stop reason: HOSPADM

## 2020-06-12 RX ORDER — OXYCODONE HYDROCHLORIDE AND ACETAMINOPHEN 5; 325 MG/1; MG/1
1 TABLET ORAL EVERY 4 HOURS PRN
Status: DISCONTINUED | OUTPATIENT
Start: 2020-06-12 | End: 2020-06-13 | Stop reason: HOSPADM

## 2020-06-12 RX ORDER — DOCUSATE SODIUM 100 MG/1
100 CAPSULE, LIQUID FILLED ORAL 2 TIMES DAILY
Status: DISCONTINUED | OUTPATIENT
Start: 2020-06-12 | End: 2020-06-13 | Stop reason: HOSPADM

## 2020-06-12 RX ORDER — BISACODYL 10 MG
10 SUPPOSITORY, RECTAL RECTAL DAILY PRN
Status: DISCONTINUED | OUTPATIENT
Start: 2020-06-13 | End: 2020-06-13 | Stop reason: HOSPADM

## 2020-06-12 RX ORDER — HYDROCODONE BITARTRATE AND ACETAMINOPHEN 5; 325 MG/1; MG/1
1 TABLET ORAL EVERY 4 HOURS PRN
Status: DISCONTINUED | OUTPATIENT
Start: 2020-06-12 | End: 2020-06-13 | Stop reason: HOSPADM

## 2020-06-12 RX ORDER — ONDANSETRON 2 MG/ML
4 INJECTION INTRAMUSCULAR; INTRAVENOUS ONCE AS NEEDED
Status: DISCONTINUED | OUTPATIENT
Start: 2020-06-12 | End: 2020-06-12 | Stop reason: HOSPADM

## 2020-06-12 RX ORDER — METOCLOPRAMIDE HYDROCHLORIDE 5 MG/ML
10 INJECTION INTRAMUSCULAR; INTRAVENOUS ONCE AS NEEDED
Status: DISCONTINUED | OUTPATIENT
Start: 2020-06-12 | End: 2020-06-12 | Stop reason: HOSPADM

## 2020-06-12 RX ORDER — HYDROCORTISONE 25 MG/G
1 CREAM TOPICAL AS NEEDED
Status: DISCONTINUED | OUTPATIENT
Start: 2020-06-12 | End: 2020-06-13 | Stop reason: HOSPADM

## 2020-06-12 RX ORDER — TRISODIUM CITRATE DIHYDRATE AND CITRIC ACID MONOHYDRATE 500; 334 MG/5ML; MG/5ML
30 SOLUTION ORAL ONCE
Status: DISCONTINUED | OUTPATIENT
Start: 2020-06-12 | End: 2020-06-12 | Stop reason: HOSPADM

## 2020-06-12 RX ADMIN — LIDOCAINE HYDROCHLORIDE AND EPINEPHRINE 3 ML: 15; 5 INJECTION, SOLUTION EPIDURAL at 08:53

## 2020-06-12 RX ADMIN — LIDOCAINE HYDROCHLORIDE AND EPINEPHRINE 1 ML: 15; 5 INJECTION, SOLUTION EPIDURAL at 08:56

## 2020-06-12 RX ADMIN — Medication 15 ML/HR: at 09:00

## 2020-06-12 RX ADMIN — IBUPROFEN 600 MG: 600 TABLET, FILM COATED ORAL at 23:54

## 2020-06-12 RX ADMIN — WITCH HAZEL 1 PAD: 500 SOLUTION RECTAL; TOPICAL at 16:17

## 2020-06-12 RX ADMIN — IBUPROFEN 600 MG: 600 TABLET, FILM COATED ORAL at 16:17

## 2020-06-12 RX ADMIN — Medication: at 16:17

## 2020-06-12 RX ADMIN — FENTANYL CITRATE 100 MCG: 50 INJECTION, SOLUTION INTRAMUSCULAR; INTRAVENOUS at 08:56

## 2020-06-12 RX ADMIN — PRENATAL VITAMINS-IRON FUMARATE 27 MG IRON-FOLIC ACID 0.8 MG TABLET 1 TABLET: at 14:49

## 2020-06-12 RX ADMIN — ROPIVACAINE HYDROCHLORIDE 10 ML: 5 INJECTION, SOLUTION EPIDURAL; INFILTRATION; PERINEURAL at 08:58

## 2020-06-12 NOTE — L&D DELIVERY NOTE
[facility]  Vaginal Delivery Note    Delivery     Delivery: Vaginal, Spontaneous     YOB: 2019    Time of Birth:  Gestational Age 11:18 AM   38w4d     Anesthesia: Epidural     Delivering clinician: Merrick Brary    Forceps?   No   Vacuum? No    Shoulder dystocia present: No        Delivery narrative: Patient experienced a spontaneous vaginal delivery under epidural anesthesia of a viable male infant without episiotomy or lacerations.  The placenta was expressed.  Uterus was not explored.  No lacerations were noted.  Estimated blood loss was 100 mL.  Patient tolerated the procedure well.    Infant    Findings: male  infant     Infant observations: Weight: 3630 g (8 lb)   Length: 20.5  in  Observations/Comments:         Apgars: 8   @ 1 minute /    9   @ 5 minutes   Infant Name:      Placenta, Cord, and Fluid    Placenta delivered  Spontaneous  at   6/12/2020 11:22 AM     Cord: 3 vessels  present.   Nuchal Cord?  no   Cord blood obtained: Yes    Cord gases obtained:  No    Cord gas results: Venous:  No results found for: PHCVEN    Arterial:  No results found for: PHCART     Repair    Episiotomy: None     No    Lacerations: No   Estimated Blood Loss: Est. Blood Loss (mL): 100 mL(Filed from Delivery Summary) (06/12/20 1118)           Complications  none    Disposition  Mother to Mother Baby/Postpartum  in stable condition currently.  Baby to NBN  in stable condition currently.      Merrick Barry MD  06/12/20  13:01

## 2020-06-12 NOTE — PROGRESS NOTES
Patient is having mild to moderate contractions every 2 to 3 minutes.  Fetal heart tones are reassuring.  Pelvic exam reveals the cervix to be 3 to 4 cm, 60% effaced, and -1-2 station.  Membranes were noted over the fetal head and these were ruptured.  Clear fluid was noted.  Fetal heart tones were good pre-and post procedure.  Patient is slowly progressing.    Merrick Barry MD

## 2020-06-12 NOTE — ANESTHESIA PROCEDURE NOTES
Labor Epidural      Patient reassessed immediately prior to procedure    Patient location during procedure: OB  Performed By  CRNA: Amanda Grajeda CRNA  Preanesthetic Checklist  Completed: patient identified, surgical consent, pre-op evaluation, timeout performed, IV checked, risks and benefits discussed and monitors and equipment checked  Prep:  Pt Position:sitting  Sterile Tech:cap, gloves, mask and sterile barrier  Prep:DuraPrep  Monitoring:blood pressure monitoring  Epidural Block Procedure:  Approach:midline  Guidance:palpation technique  Location:L3-L4  Needle Type:Tuohy  Needle Gauge:17 G  Loss of Resistance Medium: saline  Loss of Resistance: 6cm  Cath Depth at skin:10 cm  Paresthesia: none  Aspiration:negative  Test Dose:negative  Number of Attempts: 2  Post Assessment:  Dressing:occlusive dressing applied and secured with tape  Pt Tolerance:patient tolerated the procedure well with no apparent complications  Complications:no

## 2020-06-12 NOTE — LACTATION NOTE
06/12/20 1300   Maternal Information   Person Making Referral other (see comments)  (Courtesy visit, Teaching done)   Maternal Reason for Referral other (see comments)  (Dis not nurse 1st child)   Equipment Type   Breast Pump Type double electric, personal  (Reports she has a breast pump gotten through insurance)

## 2020-06-12 NOTE — ANESTHESIA PREPROCEDURE EVALUATION
Anesthesia Evaluation     Patient summary reviewed and Nursing notes reviewed   NPO Solid Status: > 6 hours  NPO Liquid Status: > 6 hours           Airway   Mallampati: I  TM distance: >3 FB  Neck ROM: full  No difficulty expected  Dental      Pulmonary - negative pulmonary ROS   Cardiovascular - negative cardio ROS        Neuro/Psych  (+) psychiatric history Anxiety,     GI/Hepatic/Renal/Endo - negative ROS     Musculoskeletal (-) negative ROS    Abdominal    Substance History - negative use     OB/GYN    (+) Pregnant,         Other                        Anesthesia Plan    ASA 2     epidural       Anesthetic plan, all risks, benefits, and alternatives have been provided, discussed and informed consent has been obtained with: patient.

## 2020-06-13 VITALS
RESPIRATION RATE: 16 BRPM | DIASTOLIC BLOOD PRESSURE: 67 MMHG | HEIGHT: 64 IN | OXYGEN SATURATION: 100 % | TEMPERATURE: 98 F | SYSTOLIC BLOOD PRESSURE: 116 MMHG | HEART RATE: 67 BPM | BODY MASS INDEX: 28.68 KG/M2 | WEIGHT: 168 LBS

## 2020-06-13 LAB
BASOPHILS # BLD AUTO: 0.04 10*3/MM3 (ref 0–0.2)
BASOPHILS NFR BLD AUTO: 0.4 % (ref 0–1.5)
DEPRECATED RDW RBC AUTO: 40.4 FL (ref 37–54)
EOSINOPHIL # BLD AUTO: 0.34 10*3/MM3 (ref 0–0.4)
EOSINOPHIL NFR BLD AUTO: 3 % (ref 0.3–6.2)
ERYTHROCYTE [DISTWIDTH] IN BLOOD BY AUTOMATED COUNT: 12.3 % (ref 12.3–15.4)
HCT VFR BLD AUTO: 30.9 % (ref 34–46.6)
HGB BLD-MCNC: 10 G/DL (ref 12–15.9)
IMM GRANULOCYTES # BLD AUTO: 0.08 10*3/MM3 (ref 0–0.05)
IMM GRANULOCYTES NFR BLD AUTO: 0.7 % (ref 0–0.5)
LYMPHOCYTES # BLD AUTO: 2.88 10*3/MM3 (ref 0.7–3.1)
LYMPHOCYTES NFR BLD AUTO: 25.4 % (ref 19.6–45.3)
MCH RBC QN AUTO: 29.4 PG (ref 26.6–33)
MCHC RBC AUTO-ENTMCNC: 32.4 G/DL (ref 31.5–35.7)
MCV RBC AUTO: 90.9 FL (ref 79–97)
MONOCYTES # BLD AUTO: 0.58 10*3/MM3 (ref 0.1–0.9)
MONOCYTES NFR BLD AUTO: 5.1 % (ref 5–12)
NEUTROPHILS # BLD AUTO: 7.44 10*3/MM3 (ref 1.7–7)
NEUTROPHILS NFR BLD AUTO: 65.4 % (ref 42.7–76)
NRBC BLD AUTO-RTO: 0 /100 WBC (ref 0–0.2)
PLATELET # BLD AUTO: 257 10*3/MM3 (ref 140–450)
PMV BLD AUTO: 10.6 FL (ref 6–12)
RBC # BLD AUTO: 3.4 10*6/MM3 (ref 3.77–5.28)
WBC NRBC COR # BLD: 11.36 10*3/MM3 (ref 3.4–10.8)

## 2020-06-13 PROCEDURE — 99024 POSTOP FOLLOW-UP VISIT: CPT | Performed by: OBSTETRICS & GYNECOLOGY

## 2020-06-13 PROCEDURE — 85025 COMPLETE CBC W/AUTO DIFF WBC: CPT | Performed by: OBSTETRICS & GYNECOLOGY

## 2020-06-13 RX ADMIN — DOCUSATE SODIUM 100 MG: 100 CAPSULE, LIQUID FILLED ORAL at 08:39

## 2020-06-13 RX ADMIN — IBUPROFEN 600 MG: 600 TABLET, FILM COATED ORAL at 06:50

## 2020-06-13 RX ADMIN — PRENATAL VITAMINS-IRON FUMARATE 27 MG IRON-FOLIC ACID 0.8 MG TABLET 1 TABLET: at 08:39

## 2020-06-13 NOTE — DISCHARGE SUMMARY
[facility] [place]  Vaginal delivery Discharge Summary      Date of Admission: 2020    Date of Discharge:  2020    Patient: Glen Bey      MR#:[id number]    Surgeon/OB: Merrick Barry     Discharge Diagnosis: Vaginal Delivery at 38w4d, uncomplicated recovery    Procedures:  Vaginal, Spontaneous     2020    11:18 AM      Anesthesia:  Epidural     Presenting Problem/History of Present Illness  Pregnancy [Z34.90]  Normal labor [O80, Z37.9]     Patient Active Problem List   Diagnosis   • Postpartum care following vaginal delivery       Hospital Course  Patient is a 25 y.o. female  at 38w4d status post vaginal delivery.    Uneventful recovery.  Patient is ambulating, tolerating a regular diet.  Perineum is intact.  Patient is breast-feeding and this is going well.  She will discuss birth control at her 2-week checkup.    Infant:   male  fetus 3630 g (8 lb)  with Apgar scores of 8  , 9   at five minutes.    Condition on Discharge:  Stable    Vital Signs  Temp:  [97.9 °F (36.6 °C)-98.9 °F (37.2 °C)] 98 °F (36.7 °C)  Heart Rate:  [] 67  Resp:  [16-18] 16  BP: ()/(50-72) 116/67    Lab Results   Component Value Date    WBC 11.36 (H) 2020    HGB 10.0 (L) 2020    HCT 30.9 (L) 2020    MCV 90.9 2020     2020     The patient is AB+, rubella immune, and hepatitis B, C nonreactive    Discharge Disposition  Home or Self Care    Discharge Medications     Discharge Medications      Continue These Medications      Instructions Start Date   PRENATAL VITAMIN PO   Oral      valACYclovir 1000 MG tablet  Commonly known as:  VALTREX   TAKE 2 TABLETS BY MOUTH AS SOON AS SYMPOMS APPEAR TWICE A DAY             Discharge Diet: Regular    Activity at Discharge:   Activity Instructions     Activity as Tolerated      Bathing Restrictions      Type of Restriction:  Bathing    Bathing Restrictions:  No Tub Bath    Driving  Restrictions      Type of Restriction:  Driving    Driving Restrictions:  No Driving Until Next Appointment    Housework Restrictions      Type of Restriction:  Housework    Explain Housework Restrictions:  Patient may do light housework    Sexual Activity Restrictions      Type of Restriction:  Sex    Explain Sexual Activity Restrictions:  No intercourse for 4 weeks    Work Restrictions      Type of Restriction:  Work    May Return to Work:  Other    Return to Work Instructions:  Patient may return to work in 6 weeks          Follow-up Appointments  Future Appointments   Date Time Provider Department Center   6/14/2020 12:45 PM COVID-19 [facility]     Additional Instructions for the Follow-ups that You Need to Schedule     Discharge Follow-up with Specified Provider: Dr. Barry; 2 Weeks   As directed      To:  Dr. Barry    Follow Up:  2 Weeks                 Merrick Barry MD  06/13/20  10:06

## 2020-06-13 NOTE — PROGRESS NOTES
[facility] [place]    2020    Name:Glen Bey   MR#:[id number]    Vaginal Delivery Progress Note    HD#2    Chief Complaint:   Patient feels well with no complaints    Subjective   Postpartum Day 1: 25 y.o. yo Female  delivered at 38w4d  delivered a male  infant.     The patient feels well.  Her pain is controlled.    She is ambulating well.  Patient describes her bleeding as thin lochia.    Breastfeeding: infant latching without difficulty without pain.     Patient Active Problem List   Diagnosis   • Postpartum care following vaginal delivery       Objective   Vital Signs Range for the last 24 hours  Temp: Temp:  [97.9 °F (36.6 °C)-98.9 °F (37.2 °C)] 98 °F (36.7 °C) Temp src: Oral   BP: BP: ()/(50-94) 116/67        Pulse: Heart Rate:  [] 67  RR: Resp:  [16-18] 16    Lab Results   Component Value Date    WBC 11.36 (H) 2020    HGB 10.0 (L) 2020    HCT 30.9 (L) 2020    MCV 90.9 2020     2020       Physical Exam  General:  no acute distresss.  Abdomen: Fundus: appropriate, firm, non tender Fundus: Firm with scant lochia  Extremities: no cyanosis, and 1+ edema, no CT    Perineum:  Intact    Assessment/Plan   1.  PPD# 1      Plan:  Routine Postpartum care      Merrick Barry MD  2020 08:44

## 2020-06-13 NOTE — PLAN OF CARE
VSS, Small lochia.  Pain well controlled with PO meds.  Breast feeding well.  Good bonding with baby noted.  Pt. is ready for discharge today.

## 2020-06-13 NOTE — LACTATION NOTE
Patient said infant has been breastfeeding well, but he has just gone to be circumcised.  Patient was encouraged to feed him as soon as he gets back from circumcision and to hold him skin-to-skin as much as possible.  She was advised to pump her breasts if he missed more than one feeding.  She was provided oral feeding syringes.

## 2020-06-13 NOTE — PLAN OF CARE
Problem: Patient Care Overview  Goal: Plan of Care Review  Outcome: Ongoing (interventions implemented as appropriate)  Flowsheets (Taken 6/13/2020 6029)  Progress: improving  Plan of Care Reviewed With: patient  Note:   Pain controlled well this shift, voiding, lochia light, V/S WDL

## 2020-06-14 ENCOUNTER — APPOINTMENT (OUTPATIENT)
Dept: PREADMISSION TESTING | Facility: HOSPITAL | Age: 25
End: 2020-06-14

## 2020-07-06 ENCOUNTER — POSTPARTUM VISIT (OUTPATIENT)
Dept: OBSTETRICS AND GYNECOLOGY | Facility: CLINIC | Age: 25
End: 2020-07-06

## 2020-07-06 VITALS
BODY MASS INDEX: 24.92 KG/M2 | WEIGHT: 146 LBS | HEIGHT: 64 IN | RESPIRATION RATE: 14 BRPM | SYSTOLIC BLOOD PRESSURE: 100 MMHG | DIASTOLIC BLOOD PRESSURE: 68 MMHG

## 2020-07-06 DIAGNOSIS — K62.5 RECTAL BLEEDING: ICD-10-CM

## 2020-07-06 DIAGNOSIS — Z30.09 COUNSELING FOR INITIATION OF BIRTH CONTROL METHOD: ICD-10-CM

## 2020-07-06 PROCEDURE — 0503F POSTPARTUM CARE VISIT: CPT | Performed by: OBSTETRICS & GYNECOLOGY

## 2020-07-06 RX ORDER — DOCUSATE SODIUM 100 MG/1
100 CAPSULE, LIQUID FILLED ORAL 2 TIMES DAILY
Qty: 60 CAPSULE | Refills: 1 | Status: SHIPPED | OUTPATIENT
Start: 2020-07-06 | End: 2022-05-25

## 2020-07-06 RX ORDER — ACETAMINOPHEN AND CODEINE PHOSPHATE 120; 12 MG/5ML; MG/5ML
1 SOLUTION ORAL DAILY
Qty: 28 TABLET | Refills: 12 | Status: SHIPPED | OUTPATIENT
Start: 2020-07-06 | End: 2020-12-14

## 2020-07-06 NOTE — PROGRESS NOTES
Subjective   Glen Bey is a 25 y.o. female is here today for follow-up.    Chief Complaint   Patient presents with   • Postpartum Care     Patient complains of hemorrhoids. She wants birth control        History of Present Illness  Patient is about 4 weeks post vaginal delivery.  She is doing well.  She is having no postpartum depression.  She is breast-feeding.  She wants to use progesterone only pill for birth control.  Patient is complaining of painful bowel movements with rectal bleeding.  We will start the patient on Colace and have her return in 3 weeks.  The following portions of the patient's history were reviewed and updated as appropriate: allergies, current medications, past family history, past medical history, past social history, past surgical history and problem list.    Review of Systems - History obtained from the patient  General ROS: negative  Psychological ROS: negative  ENT ROS: negative  Allergy and Immunology ROS: negative  Hematological and Lymphatic ROS: negative  Endocrine ROS: negative  Breast ROS: negative for breast lumps  Respiratory ROS: no cough, shortness of breath, or wheezing  Cardiovascular ROS: no chest pain or dyspnea on exertion  Gastrointestinal ROS: no abdominal pain, change in bowel habits, or black or bloody stools  Genito-Urinary ROS: no dysuria, trouble voiding, or hematuria  Musculoskeletal ROS: negative  Neurological ROS: no TIA or stroke symptoms  Dermatological ROS: negative     Objective   General:  well developed; well nourished  no acute distress   Skin:  Not performed.   Thyroid: not examined   Breasts:  Not performed.   Abdomen: Not performed.   Heart: regular rate and rhythm, S1, S2 normal, no murmur, click, rub or gallop   Lungs: clear to auscultation   Pelvis: Clinical staff was present for exam  no hemorrhoids seen         Assessment/Plan   Glen was seen today for postpartum care.    Diagnoses and all orders for this visit:    Postpartum care following  vaginal delivery    Counseling for initiation of birth control method  -     norethindrone (MICRONOR) 0.35 MG tablet; Take 1 tablet by mouth Daily.    Rectal bleeding  -     docusate sodium (Colace) 100 MG capsule; Take 1 capsule by mouth 2 (Two) Times a Day.      Return in 3 weeks    Merrick Barry MD

## 2020-08-04 ENCOUNTER — TELEPHONE (OUTPATIENT)
Dept: OBSTETRICS AND GYNECOLOGY | Facility: CLINIC | Age: 25
End: 2020-08-04

## 2020-08-05 ENCOUNTER — TELEPHONE (OUTPATIENT)
Dept: OBSTETRICS AND GYNECOLOGY | Facility: CLINIC | Age: 25
End: 2020-08-05

## 2020-09-29 NOTE — PROGRESS NOTES
No results found for: ABORH, LABANTI, ABID    Chief Complaint   Patient presents with   • Routine Prenatal Visit     ob follow up no complaints       HPI: Glen is a  currently at [facility] who today reports the following: Nausea-  YES; Contractions: No,   Vaginal bleeding- No; Heartburn- No    Today Glen complains of nausea with vomiting for 6 days  See ob flowsheet for physical exam.    Review of Systems - Negative except for present illness     Prenatal Vitals  BP: 112/72  Weight: 61.7 kg (136 lb)  Urine Glucose/Protein  Urine Glucose: Negative  Urine Protein: Negative  Vaginal Drainage  Draining Fluid: No  Edema  LLE Edema: None  RLE Edema: None  Facial Edema: None     Tests done today: none  At the time of the next visit, she will need to have none    Impression:   Encounter Diagnoses   Name Primary?   • Prenatal care, subsequent pregnancy, first trimester Yes   • Nausea and vomiting during pregnancy prior to 22 weeks gestation        Plan: Return in 2 weeks    This note was electronically signed.    Merrick Barry MD    Please forward PET-CT to her PCP, may need ENT referral for parotid gland finding.      CT chest in 3 months and follow up after

## 2020-12-13 DIAGNOSIS — Z30.09 COUNSELING FOR INITIATION OF BIRTH CONTROL METHOD: ICD-10-CM

## 2020-12-14 NOTE — TELEPHONE ENCOUNTER
Dr. Barry's patient is requesting a refill on her birth control Micronor.   [phone] called patient to make sure she is still breast feeding before renewing her Rx for Micronor. Left message for patient to return my call.

## 2020-12-15 RX ORDER — ACETAMINOPHEN AND CODEINE PHOSPHATE 120; 12 MG/5ML; MG/5ML
SOLUTION ORAL
Qty: 28 TABLET | Refills: 0 | Status: SHIPPED | OUTPATIENT
Start: 2020-12-15 | End: 2021-03-04

## 2021-03-04 DIAGNOSIS — Z30.09 COUNSELING FOR INITIATION OF BIRTH CONTROL METHOD: ICD-10-CM

## 2021-03-04 RX ORDER — ACETAMINOPHEN AND CODEINE PHOSPHATE 120; 12 MG/5ML; MG/5ML
SOLUTION ORAL
Qty: 28 TABLET | Refills: 0 | Status: SHIPPED | OUTPATIENT
Start: 2021-03-04 | End: 2021-05-05 | Stop reason: ALTCHOICE

## 2021-05-04 NOTE — TELEPHONE ENCOUNTER
Dr. Barry's patient called requesting a refill on her birth control. I will call patient to make sure she is still breast feeding if not she will need to be switched to another OCP.   [phone] voicemail box not setup yet.   [phone] no answer, no voicemail unable to leave a message.

## 2021-05-04 NOTE — TELEPHONE ENCOUNTER
DR REYES PATIENT CALLED TO REQUEST REFILL FOR HER BIRTH CONTROL. SHE USES THE [facility] IN [place]

## 2021-05-05 RX ORDER — NORGESTIMATE AND ETHINYL ESTRADIOL 0.25-0.035
1 KIT ORAL DAILY
Qty: 28 TABLET | Refills: 1 | Status: SHIPPED | OUTPATIENT
Start: 2021-05-05 | End: 2022-05-25

## 2021-05-05 NOTE — TELEPHONE ENCOUNTER
[phone] spoke with patient and she is no longer breast feeding. I advised her she will need to be put on a different OCP. We will send in a prescription for Sprintec until her appointment 6/14/2021. Patient verbalized understanding.

## 2021-07-12 NOTE — TELEPHONE ENCOUNTER
Patient was a no-show for her last appointment. She will need to be seen in the office for additional refills.

## 2021-08-31 NOTE — TELEPHONE ENCOUNTER
Patient was called to discuss lab results there was no answer but a message was left to return the call.    Merrick Barry MD     Closed fracture of right hip

## 2022-05-25 ENCOUNTER — OFFICE VISIT (OUTPATIENT)
Dept: FAMILY MEDICINE CLINIC | Facility: CLINIC | Age: 27
End: 2022-05-25

## 2022-05-25 ENCOUNTER — TELEPHONE (OUTPATIENT)
Dept: FAMILY MEDICINE CLINIC | Facility: CLINIC | Age: 27
End: 2022-05-25

## 2022-05-25 VITALS
OXYGEN SATURATION: 98 % | WEIGHT: 132.5 LBS | HEIGHT: 65 IN | HEART RATE: 79 BPM | DIASTOLIC BLOOD PRESSURE: 54 MMHG | TEMPERATURE: 98.1 F | RESPIRATION RATE: 16 BRPM | BODY MASS INDEX: 22.08 KG/M2 | SYSTOLIC BLOOD PRESSURE: 96 MMHG

## 2022-05-25 DIAGNOSIS — Z32.01 POSITIVE URINE PREGNANCY TEST: ICD-10-CM

## 2022-05-25 DIAGNOSIS — F41.8 DEPRESSION WITH ANXIETY: Primary | ICD-10-CM

## 2022-05-25 DIAGNOSIS — Z30.41 ENCOUNTER FOR SURVEILLANCE OF CONTRACEPTIVE PILLS: ICD-10-CM

## 2022-05-25 LAB
B-HCG UR QL: POSITIVE
EXPIRATION DATE: ABNORMAL
INTERNAL NEGATIVE CONTROL: NEGATIVE
INTERNAL POSITIVE CONTROL: POSITIVE
Lab: ABNORMAL

## 2022-05-25 PROCEDURE — 81025 URINE PREGNANCY TEST: CPT | Performed by: PHYSICIAN ASSISTANT

## 2022-05-25 PROCEDURE — 99203 OFFICE O/P NEW LOW 30 MIN: CPT | Performed by: PHYSICIAN ASSISTANT

## 2022-05-25 RX ORDER — NORGESTIMATE AND ETHINYL ESTRADIOL 0.25-0.035
1 KIT ORAL DAILY
Qty: 28 TABLET | Refills: 11 | Status: SHIPPED | OUTPATIENT
Start: 2022-05-25 | End: 2022-05-26

## 2022-05-25 RX ORDER — PROPRANOLOL HYDROCHLORIDE 20 MG/1
20 TABLET ORAL DAILY PRN
COMMUNITY
Start: 2022-04-20 | End: 2022-09-26 | Stop reason: SDDI

## 2022-05-25 RX ORDER — FLUOXETINE HYDROCHLORIDE 20 MG/1
20 CAPSULE ORAL DAILY
Qty: 30 CAPSULE | Refills: 2 | Status: SHIPPED | OUTPATIENT
Start: 2022-05-25 | End: 2022-09-26 | Stop reason: SDDI

## 2022-05-25 RX ORDER — BUPROPION HYDROCHLORIDE 75 MG/1
75 TABLET ORAL 2 TIMES DAILY
Qty: 60 TABLET | Refills: 1 | Status: SHIPPED | OUTPATIENT
Start: 2022-05-25 | End: 2022-05-26

## 2022-05-25 RX ORDER — FLUOXETINE HYDROCHLORIDE 20 MG/1
20 CAPSULE ORAL DAILY
COMMUNITY
Start: 2022-04-27 | End: 2022-05-25 | Stop reason: SDUPTHER

## 2022-05-25 NOTE — PROGRESS NOTES
"Subjective   Glen Bey is a 27 y.o. female.     History of Present Illness   Pt presents to establish care   transferring care from  office in [place]   Has been diagnosed with depression and anxiety   Has 2 children. No issues with pregnancies. Both delivered vaginally. UTD on pap smears. Needs refill on birth control until she gets in with GYN. Notes she is on cycle now. Notes it is a little heavier than normal. Last cycle prior to this was one month ago. Does not prevent pregnancy.   Depressed mood worse after pregnancy   Low moods, depressed mood, little motivation, hard time concentration   Working as  and serving   No recent blood work   Felt like prozac helped at first. Been on for 5-6 months   Had been on paxil in the past. Did not help     The following portions of the patient's history were reviewed and updated as appropriate: allergies, current medications, past family history, past medical history, past social history, past surgical history and problem list.    Review of Systems  As noted per HPI     Objective    Blood pressure 96/54, pulse 79, temperature 98.1 °F (36.7 °C), resp. rate 16, height 165.1 cm (65\"), weight 60.1 kg (132 lb 8 oz), SpO2 98 %, not currently breastfeeding.     Physical Exam  Vitals and nursing note reviewed.   Constitutional:       Appearance: Normal appearance.   HENT:      Head: Normocephalic.      Right Ear: External ear normal.      Left Ear: External ear normal.      Nose: Nose normal.   Eyes:      Conjunctiva/sclera: Conjunctivae normal.   Cardiovascular:      Rate and Rhythm: Normal rate and regular rhythm.      Heart sounds: Normal heart sounds.   Pulmonary:      Effort: Pulmonary effort is normal. No respiratory distress.      Breath sounds: Normal breath sounds. No wheezing or rhonchi.   Skin:     General: Skin is warm and dry.   Neurological:      Mental Status: She is alert and oriented to person, place, and time.   Psychiatric:         Mood and " Affect: Mood normal.         Behavior: Behavior normal.         Thought Content: Thought content normal.         Judgment: Judgment normal.         Assessment & Plan   Diagnoses and all orders for this visit:    1. Depression with anxiety (Primary)  -     Ambulatory Referral to Behavioral Health  -     Discontinue: buPROPion (WELLBUTRIN) 75 MG tablet; Take 1 tablet by mouth 2 (Two) Times a Day.  Dispense: 60 tablet; Refill: 1  -     FLUoxetine (PROzac) 20 MG capsule; Take 1 capsule by mouth Daily.  Dispense: 30 capsule; Refill: 2    2. Encounter for surveillance of contraceptive pills  -     Discontinue: norgestimate-ethinyl estradiol (Sprintec 28) 0.25-35 MG-MCG per tablet; Take 1 tablet by mouth Daily.  Dispense: 28 tablet; Refill: 11  -     POCT pregnancy, urine    3. Positive urine pregnancy test    was going to initiate low dose wellbutrin in addition to her prozac, however while screening for pregnancy to re-initiate OCP- she was found to have a positive urine pregnancy test. Pt directed to follow up with her OBGYN as soon as possible due to current vaginal bleeding. Possible early miscarriage. Pt voiced understanding  Any medication changes pending follow up with OB.

## 2022-05-26 PROBLEM — F41.8 DEPRESSION WITH ANXIETY: Status: ACTIVE | Noted: 2022-05-26

## 2022-05-26 PROBLEM — Z32.01 POSITIVE URINE PREGNANCY TEST: Status: ACTIVE | Noted: 2022-05-26

## 2022-06-20 ENCOUNTER — OFFICE VISIT (OUTPATIENT)
Dept: OBSTETRICS AND GYNECOLOGY | Facility: CLINIC | Age: 27
End: 2022-06-20

## 2022-06-20 VITALS
WEIGHT: 131.2 LBS | SYSTOLIC BLOOD PRESSURE: 112 MMHG | BODY MASS INDEX: 21.86 KG/M2 | HEIGHT: 65 IN | DIASTOLIC BLOOD PRESSURE: 60 MMHG

## 2022-06-20 DIAGNOSIS — Z32.00 UNCONFIRMED PREGNANCY: Primary | ICD-10-CM

## 2022-06-20 LAB
B-HCG UR QL: POSITIVE
EXPIRATION DATE: ABNORMAL
INTERNAL NEGATIVE CONTROL: NEGATIVE
INTERNAL POSITIVE CONTROL: ABNORMAL
Lab: ABNORMAL

## 2022-06-20 PROCEDURE — 99214 OFFICE O/P EST MOD 30 MIN: CPT | Performed by: OBSTETRICS & GYNECOLOGY

## 2022-06-20 PROCEDURE — 81025 URINE PREGNANCY TEST: CPT | Performed by: OBSTETRICS & GYNECOLOGY

## 2022-06-20 RX ORDER — BUPROPION HYDROCHLORIDE 75 MG/1
75 TABLET ORAL 2 TIMES DAILY
COMMUNITY
End: 2022-09-13 | Stop reason: SDUPTHER

## 2022-06-20 NOTE — PROGRESS NOTES
"Subjective   Chief Complaint   Patient presents with   • TAB     Glen Bey is a 27 y.o. year old .   Patient's last menstrual period was 2022 (exact date)..   Her menses are regular.  She presents with bleeding, intermittently x 3 weeks.  She was seen with her PCP 2022 to begin OCP.  She had a +UPT at time time.  She admits to having bleeding at that time, with clotting. She states that bleeding resolved the next week, but has since returned.  Bleeding was light/spotting last week, and has been heavy, without clots this week.  She had a second +UPT today.  She denies cramping.     She is not aware of when her last pap smear was completed, and denies h/o abnormal paps.     · Pregnancy testing to date: UCG positive on 2022 at PCP office. Home +UPT 2022.  · Pregnancy imaging to date: transvaginal ultrasound done on today. Result no IUP or evidence of ectopic. Discussed findings and plan discussed.     Her blood type is No results found for: ABORH.    OTHER THINGS SHE WANTS TO DISCUSS TODAY:  Nothing else    The following portions of the patient's history were reviewed and updated as appropriate:problem list, current medications, allergies, past family history, past medical history, past social history and past surgical history    Social History    Tobacco Use      Smoking status: Never Smoker      Smokeless tobacco: Never Used    Review of Systems   Constitutional: Negative.    HENT: Negative.    Respiratory: Negative.    Cardiovascular: Negative.    Gastrointestinal: Negative.    Genitourinary: Positive for menstrual problem. Negative for pelvic pain.   Musculoskeletal: Negative.    Skin: Negative.    Neurological: Negative.    Hematological: Negative.    Psychiatric/Behavioral: Negative.           Objective   /60   Ht 165.1 cm (65\")   Wt 59.5 kg (131 lb 3.2 oz)   LMP 2022 (Exact Date)   Breastfeeding No   BMI 21.83 kg/m²     Physical Exam  Vitals reviewed. "   Constitutional:       Appearance: Normal appearance. She is normal weight.   HENT:      Head: Normocephalic and atraumatic.   Abdominal:      General: Abdomen is flat. Bowel sounds are normal.      Palpations: Abdomen is soft.   Skin:     General: Skin is warm and dry.   Neurological:      Mental Status: She is alert and oriented to person, place, and time.   Psychiatric:         Mood and Affect: Mood normal.         Behavior: Behavior normal.       Lab Review   Labs: Urine pregnancy test       Patient's blood type is:  unknown    Imaging   Ultrasound - Pelvic Vaginal        Assessment   1. Threatened      Plan   1. Threatened . Will correlate US findings with hcg level. Hcg, blood type, cbc and progesterone levels drawn today. Discussed bleeding and pain precautions today.   2. F/u in 1 week for annual GYN exam.     New Medications Ordered This Visit   Medications   • Prenatal Vit-Fe Fumarate-FA (PNV Folic Acid + Iron) 27-1 MG tablet     Sig: Take 1 tablet by mouth Daily.     Dispense:  30 tablet     Refill:  12          Tristian Liu MD  2022

## 2022-06-21 LAB
ABO GROUP BLD: NORMAL
ERYTHROCYTE [DISTWIDTH] IN BLOOD BY AUTOMATED COUNT: 12 % (ref 11.7–15.4)
HCG INTACT+B SERPL-ACNC: 520 MIU/ML
HCT VFR BLD AUTO: 38.5 % (ref 34–46.6)
HGB BLD-MCNC: 13.4 G/DL (ref 11.1–15.9)
MCH RBC QN AUTO: 32.1 PG (ref 26.6–33)
MCHC RBC AUTO-ENTMCNC: 34.8 G/DL (ref 31.5–35.7)
MCV RBC AUTO: 92 FL (ref 79–97)
PLATELET # BLD AUTO: 359 X10E3/UL (ref 150–450)
PROGEST SERPL-MCNC: 0.6 NG/ML
RBC # BLD AUTO: 4.18 X10E6/UL (ref 3.77–5.28)
RH BLD: POSITIVE
WBC # BLD AUTO: 6.9 X10E3/UL (ref 3.4–10.8)

## 2022-06-27 ENCOUNTER — OFFICE VISIT (OUTPATIENT)
Dept: OBSTETRICS AND GYNECOLOGY | Facility: CLINIC | Age: 27
End: 2022-06-27

## 2022-06-27 VITALS
WEIGHT: 132 LBS | HEIGHT: 65 IN | SYSTOLIC BLOOD PRESSURE: 100 MMHG | BODY MASS INDEX: 21.99 KG/M2 | DIASTOLIC BLOOD PRESSURE: 70 MMHG

## 2022-06-27 DIAGNOSIS — O20.0 THREATENED ABORTION: ICD-10-CM

## 2022-06-27 DIAGNOSIS — Z01.419 PAP TEST, AS PART OF ROUTINE GYNECOLOGICAL EXAMINATION: Primary | ICD-10-CM

## 2022-06-27 PROCEDURE — 99395 PREV VISIT EST AGE 18-39: CPT | Performed by: OBSTETRICS & GYNECOLOGY

## 2022-06-27 PROCEDURE — 3008F BODY MASS INDEX DOCD: CPT | Performed by: OBSTETRICS & GYNECOLOGY

## 2022-06-27 PROCEDURE — 2014F MENTAL STATUS ASSESS: CPT | Performed by: OBSTETRICS & GYNECOLOGY

## 2022-06-27 NOTE — PROGRESS NOTES
GYN Annual Exam     CC - Here for annual exam.     Subjective   HPI  Glen Bey is a 27 y.o. female, , who presents for annual well woman exam. Patient's last menstrual period was 2022 (exact date)..  Periods are regular every 25-35 days, lasting 5 days. The patient uses 1 of tampons/pads per hour., lasting 5 days.  Dysmenorrhea:mild, occurring first 1-2 days of flow.  Patient reports problems with: patient has positive HCG.  Partner Status: Marital Status: single.  New Partners since last visit: no.  Desires STD Screening: yes.    Additional OB/GYN History   Current contraception: contraceptive methods: None  Desires to: start contraception  Last Pap : 10/18/2019  Last Completed Pap Smear     This patient has no relevant Health Maintenance data.        History of abnormal Pap smear: no  Family history of uterine, colon, breast, or ovarian cancer: no  Performs monthly Self-Breast Exam: no  Exercises Regularly:yes  Feelings of Anxiety or Depression: yes - medication controlled  Tobacco Usage?: No   OB History        3    Para   2    Term   2       0    AB   0    Living   2       SAB   0    IAB   0    Ectopic   0    Molar   0    Multiple   0    Live Births   2          Obstetric Comments   FOB #1 : Pregnancy #1-2  2017-Rain  2020 - [place]             Health Maintenance   Topic Date Due   • Annual Gynecologic Pelvic and Breast Exam  Never done   • ANNUAL PHYSICAL  Never done   • COVID-19 Vaccine (1) Never done   • HPV VACCINES (2 - 3-dose series) 10/31/2013   • TDAP/TD VACCINES (2 - Td or Tdap) 2018   • PAP SMEAR  2020   • INFLUENZA VACCINE  10/01/2022   • HEPATITIS C SCREENING  Completed   • Pneumococcal Vaccine 0-64  Aged Out       The additional following portions of the patient's history were reviewed and updated as appropriate: allergies, current medications, past family history, past medical history, past social history, past surgical history and problem  "list.    Review of Systems   Constitutional: Negative.    Respiratory: Negative.    Cardiovascular: Negative.    Gastrointestinal: Negative.    Genitourinary: Positive for menstrual problem. Negative for breast discharge, breast lump, breast pain and pelvic pain.   Musculoskeletal: Negative.    Neurological: Negative.    Psychiatric/Behavioral: Negative.        I have reviewed and agree with the HPI, ROS, and historical information as entered above. Tristian Liu MD    Objective   /70   Ht 165.1 cm (65\")   Wt 59.9 kg (132 lb)   LMP 2022 (Exact Date)   BMI 21.97 kg/m²     Physical Exam  Vitals reviewed. Exam conducted with a chaperone present.   Constitutional:       Appearance: Normal appearance. She is normal weight.   HENT:      Head: Normocephalic and atraumatic.   Cardiovascular:      Rate and Rhythm: Normal rate and regular rhythm.   Pulmonary:      Effort: Pulmonary effort is normal.      Breath sounds: Normal breath sounds.   Chest:   Breasts:      Right: Normal.      Left: Normal.       Abdominal:      General: Abdomen is flat. Bowel sounds are normal.      Palpations: Abdomen is soft.   Genitourinary:     General: Normal vulva.      Vagina: Normal.      Cervix: Normal.      Uterus: Normal.       Adnexa: Right adnexa normal and left adnexa normal.   Musculoskeletal:      Cervical back: Neck supple.   Skin:     General: Skin is warm and dry.   Neurological:      Mental Status: She is alert and oriented to person, place, and time.   Psychiatric:         Mood and Affect: Mood normal.         Behavior: Behavior normal.         Assessment & Plan     Assessment     Problem List Items Addressed This Visit    None     Visit Diagnoses     Pap test, as part of routine gynecological examination    -  Primary    Relevant Orders    LIQUID-BASED PAP SMEAR, [facility] ([facility])    Threatened         Relevant Orders    CBC (No Diff)    ABO / Rh    HCG, B-subunit, Quantitative    "       1. GYN annual well woman exam.   2. Threatened     Plan     1. Annual gynecologic exam performed today including breast and pelvic exam. Pap smear performed and preventative care information discussed.   2. Will repeat hcg level today. F/u pending results.       Tristian Liu MD  2022

## 2022-06-28 LAB
ABO GROUP BLD: NORMAL
ERYTHROCYTE [DISTWIDTH] IN BLOOD BY AUTOMATED COUNT: 12 % (ref 12.3–15.4)
HCG INTACT+B SERPL-ACNC: 70.7 MIU/ML
HCT VFR BLD AUTO: 40.6 % (ref 34–46.6)
HGB BLD-MCNC: 13.3 G/DL (ref 12–15.9)
MCH RBC QN AUTO: 31.3 PG (ref 26.6–33)
MCHC RBC AUTO-ENTMCNC: 32.8 G/DL (ref 31.5–35.7)
MCV RBC AUTO: 95.5 FL (ref 79–97)
PLATELET # BLD AUTO: 389 10*3/MM3 (ref 140–450)
RBC # BLD AUTO: 4.25 10*6/MM3 (ref 3.77–5.28)
RH BLD: POSITIVE
WBC # BLD AUTO: 6.08 10*3/MM3 (ref 3.4–10.8)

## 2022-06-29 LAB — REF LAB TEST METHOD: NORMAL

## 2022-07-07 ENCOUNTER — LAB (OUTPATIENT)
Dept: OBSTETRICS AND GYNECOLOGY | Facility: CLINIC | Age: 27
End: 2022-07-07

## 2022-07-07 DIAGNOSIS — O20.0 THREATENED ABORTION: Primary | ICD-10-CM

## 2022-07-08 LAB — HCG INTACT+B SERPL-ACNC: 18 MIU/ML

## 2022-07-11 ENCOUNTER — TELEPHONE (OUTPATIENT)
Dept: OBSTETRICS AND GYNECOLOGY | Facility: CLINIC | Age: 27
End: 2022-07-11

## 2022-07-11 NOTE — TELEPHONE ENCOUNTER
Notified patient of HCG results. Patient advised to come back in 1 week for repeat bHCG. Patient V/U

## 2022-07-30 DIAGNOSIS — F41.8 OTHER SPECIFIED ANXIETY DISORDERS: ICD-10-CM

## 2022-08-01 RX ORDER — BUPROPION HYDROCHLORIDE 75 MG/1
TABLET ORAL
Qty: 60 TABLET | Refills: 1 | OUTPATIENT
Start: 2022-08-01

## 2022-08-01 NOTE — TELEPHONE ENCOUNTER
"In note from 5/25/22, \"was going to initiate low dose wellbutrin in addition to her prozac, however while screening for pregnancy to re-initiate OCP- she was found to have a positive urine pregnancy test.\"    I don't see that Hallie has prescribed Wellbutrin and patient is currently pregnant.  "

## 2022-09-12 RX ORDER — BUPROPION HYDROCHLORIDE 75 MG/1
TABLET ORAL
Qty: 60 TABLET | Refills: 1 | OUTPATIENT
Start: 2022-09-12

## 2022-09-13 RX ORDER — BUPROPION HYDROCHLORIDE 75 MG/1
75 TABLET ORAL 2 TIMES DAILY
Qty: 60 TABLET | Refills: 2 | Status: SHIPPED | OUTPATIENT
Start: 2022-09-13 | End: 2022-09-26 | Stop reason: SDDI

## 2022-09-26 ENCOUNTER — OFFICE VISIT (OUTPATIENT)
Dept: FAMILY MEDICINE CLINIC | Facility: CLINIC | Age: 27
End: 2022-09-26

## 2022-09-26 VITALS
SYSTOLIC BLOOD PRESSURE: 96 MMHG | DIASTOLIC BLOOD PRESSURE: 52 MMHG | RESPIRATION RATE: 16 BRPM | WEIGHT: 136.5 LBS | HEIGHT: 65 IN | TEMPERATURE: 98.2 F | HEART RATE: 76 BPM | BODY MASS INDEX: 22.74 KG/M2 | OXYGEN SATURATION: 98 %

## 2022-09-26 DIAGNOSIS — F90.0 ATTENTION DEFICIT HYPERACTIVITY DISORDER (ADHD), PREDOMINANTLY INATTENTIVE TYPE: Primary | ICD-10-CM

## 2022-09-26 PROCEDURE — 99213 OFFICE O/P EST LOW 20 MIN: CPT | Performed by: PHYSICIAN ASSISTANT

## 2022-09-26 RX ORDER — ATOMOXETINE 40 MG/1
40 CAPSULE ORAL DAILY
Qty: 30 CAPSULE | Refills: 1 | Status: SHIPPED | OUTPATIENT
Start: 2022-09-26

## 2022-09-26 RX ORDER — NORGESTIMATE AND ETHINYL ESTRADIOL 0.25-0.035
1 KIT ORAL DAILY
COMMUNITY
Start: 2022-09-18

## 2022-09-26 NOTE — PROGRESS NOTES
"Subjective   Glen Bey is a 27 y.o. female.     History of Present Illness   F/u for depression with anxiety   Low moods, depressed mood, little motivation, hard time concentrating  Felt like prozac helped at first. Has not been taking for the last 30 days. Notes mood has actually improved since stopping   Had been on paxil in the past. Did not help   Bupropion X 2 weeks, but caused nightmares so she discontinued   Feels like mood has been doing a little better and anxiety has been improved.    Feels forgetful a lot. Hard time concentrating on tasks or reading   Diagnosed with ADHD when younger around age 9 or 10. Parents did not want her on medication for this.   Was seeing therapist Gretchen Marin in the past who recommended treatment for ADHD      The following portions of the patient's history were reviewed and updated as appropriate: allergies, current medications, past family history, past medical history, past social history, past surgical history and problem list.    Review of Systems  As noted per HPi     Objective    Blood pressure 96/52, pulse 76, temperature 98.2 °F (36.8 °C), resp. rate 16, height 165.1 cm (65\"), weight 61.9 kg (136 lb 8 oz), last menstrual period 04/01/2022, SpO2 98 %, not currently breastfeeding.     Physical Exam  Constitutional:       Appearance: Normal appearance.   HENT:      Head: Normocephalic.      Right Ear: External ear normal.      Left Ear: External ear normal.   Cardiovascular:      Rate and Rhythm: Normal rate and regular rhythm.   Pulmonary:      Effort: Pulmonary effort is normal. No respiratory distress.      Breath sounds: Normal breath sounds. No wheezing or rhonchi.   Skin:     General: Skin is warm and dry.   Neurological:      Mental Status: She is alert and oriented to person, place, and time.   Psychiatric:         Mood and Affect: Mood normal.         Behavior: Behavior normal.         Thought Content: Thought content normal.         Judgment: Judgment " normal.         Assessment & Plan   Diagnoses and all orders for this visit:    1. Attention deficit hyperactivity disorder (ADHD), predominantly inattentive type (Primary)  -     atomoxetine (Strattera) 40 MG capsule; Take 1 capsule by mouth Daily.  Dispense: 30 capsule; Refill: 1    trial of Strattera for ADHD symptoms. Aware that if she fails non controlled options she will need to follow with psychiatry to consider stimulant therapy. Pt aware and understood   F/u as directed

## 2022-11-01 ENCOUNTER — TELEPHONE (OUTPATIENT)
Dept: FAMILY MEDICINE CLINIC | Facility: CLINIC | Age: 27
End: 2022-11-01

## 2022-11-01 NOTE — TELEPHONE ENCOUNTER
Caller: Glen Bey    Relationship: Self    Best call back number: [phone]    What orders are you requesting (i.e. lab or imaging): TB SKIN TEST       Additional notes:      PATIENT WANTS TB SKIN TEST    PLEASE CALL WHEN ORDERS ARE PUT IN SO SHE CAN WALK IN AND GET IT    DO WE HAVE COPIES OF IMMUNIZATIONS OR DOES SHE HAVE TO CALL PEDIATRICIAN?

## 2022-11-03 NOTE — TELEPHONE ENCOUNTER
Called pt for more information.  For school she needs a Tdap, 2nd Varicella and an actual TB gold blood test. Does she need an appointment or can she just walk in for these?

## 2022-11-28 ENCOUNTER — TELEPHONE (OUTPATIENT)
Dept: FAMILY MEDICINE CLINIC | Facility: CLINIC | Age: 27
End: 2022-11-28

## 2022-12-05 ENCOUNTER — TELEPHONE (OUTPATIENT)
Dept: FAMILY MEDICINE CLINIC | Facility: CLINIC | Age: 27
End: 2022-12-05

## 2022-12-05 ENCOUNTER — CLINICAL SUPPORT (OUTPATIENT)
Dept: FAMILY MEDICINE CLINIC | Facility: CLINIC | Age: 27
End: 2022-12-05

## 2022-12-05 DIAGNOSIS — Z23 NEED FOR TUBERCULOSIS VACCINATION: Primary | ICD-10-CM

## 2022-12-05 NOTE — TELEPHONE ENCOUNTER
Called informed pt. She can walk-in for these based on last message. But we are out of varicella right now.

## 2023-01-05 ENCOUNTER — TELEPHONE (OUTPATIENT)
Dept: FAMILY MEDICINE CLINIC | Facility: CLINIC | Age: 28
End: 2023-01-05
Payer: COMMERCIAL

## 2023-01-05 NOTE — TELEPHONE ENCOUNTER
Caller: Glen Bey    Relationship to patient: Self    Best call back number: [phone]- OK TO LEAVE DETAILED MESSAGE IF NO ANSWER    Chief complaint: MUST HAVE HEP-B, T-DAP, VARICELLA FOR SCHOOL    Type of visit: VACCINATION ONLY    Requested date: MUST HAVE FULL DOSE COMPLETED AND FORM TURNED IN BY 2/4/23     Additional notes:PLEASE CALL TO SCHEDULE VACCINES WHEN IN STOCK AND ABLE TO SCHEDULE.

## 2023-01-06 ENCOUNTER — TELEPHONE (OUTPATIENT)
Dept: FAMILY MEDICINE CLINIC | Facility: CLINIC | Age: 28
End: 2023-01-06
Payer: COMMERCIAL

## 2023-01-06 DIAGNOSIS — Z11.1 SCREENING-PULMONARY TB: ICD-10-CM

## 2023-01-06 DIAGNOSIS — Z01.84 IMMUNITY STATUS TESTING: Primary | ICD-10-CM

## 2023-01-06 NOTE — TELEPHONE ENCOUNTER
Please call and confirm.  There was one message stating that she needed hepatitis B vaccine.  Has she had a hepatitis B vaccine and just needs confirmation of immunity?

## 2023-01-06 NOTE — TELEPHONE ENCOUNTER
PT WANTS TO KNOW IF SHE CAN COME IN TO GET THE HBS AB TEST AND TB BLOOD TEST DONE. [facility] IS OUT. PLEASE ADVISE IF ANYONE IN THE OFFICE CAN ORDER. PT NEEDS FOR NURSING SCHOOL.       THANKS.

## 2023-01-06 NOTE — TELEPHONE ENCOUNTER
Patient is needing lab to show immunity or not to Hep B vaccine. She has had at least 2 unsure on the 3rd. Will also need TB Gold test.

## 2023-01-09 ENCOUNTER — LAB (OUTPATIENT)
Dept: FAMILY MEDICINE CLINIC | Facility: CLINIC | Age: 28
End: 2023-01-09
Payer: COMMERCIAL

## 2023-01-09 DIAGNOSIS — Z11.1 SCREENING-PULMONARY TB: ICD-10-CM

## 2023-01-09 DIAGNOSIS — Z01.84 IMMUNITY STATUS TESTING: ICD-10-CM

## 2023-01-11 LAB
GAMMA INTERFERON BACKGROUND BLD IA-ACNC: 0.02 IU/ML
HBV SURFACE AB SER QL: NON REACTIVE
M TB IFN-G BLD-IMP: NEGATIVE
M TB IFN-G CD4+ T-CELLS BLD-ACNC: 0.05 IU/ML
M TBIFN-G CD4+ CD8+T-CELLS BLD-ACNC: 0.03 IU/ML
MITOGEN IGNF BLD-ACNC: >10 IU/ML
QUANTIFERON INCUBATION: NORMAL
SERVICE CMNT-IMP: NORMAL

## 2023-05-02 ENCOUNTER — INITIAL PRENATAL (OUTPATIENT)
Dept: OBSTETRICS AND GYNECOLOGY | Facility: CLINIC | Age: 28
End: 2023-05-02
Payer: COMMERCIAL

## 2023-05-02 VITALS — DIASTOLIC BLOOD PRESSURE: 78 MMHG | WEIGHT: 142 LBS | BODY MASS INDEX: 23.63 KG/M2 | SYSTOLIC BLOOD PRESSURE: 112 MMHG

## 2023-05-02 DIAGNOSIS — Z34.91 INITIAL OBSTETRIC VISIT IN FIRST TRIMESTER: Primary | ICD-10-CM

## 2023-05-02 DIAGNOSIS — O21.9 NAUSEA AND VOMITING IN PREGNANCY: ICD-10-CM

## 2023-05-02 LAB
GLUCOSE UR STRIP-MCNC: NEGATIVE MG/DL
PROT UR STRIP-MCNC: NEGATIVE MG/DL

## 2023-05-02 RX ORDER — PRENATAL WITH FERROUS FUM AND FOLIC ACID 3080; 920; 120; 400; 22; 1.84; 3; 20; 10; 1; 12; 200; 27; 25; 2 [IU]/1; [IU]/1; MG/1; [IU]/1; MG/1; MG/1; MG/1; MG/1; MG/1; MG/1; UG/1; MG/1; MG/1; MG/1; MG/1
TABLET ORAL DAILY
COMMUNITY

## 2023-05-02 RX ORDER — PROMETHAZINE HYDROCHLORIDE 12.5 MG/1
12.5 TABLET ORAL EVERY 6 HOURS PRN
Qty: 30 TABLET | Refills: 3 | Status: SHIPPED | OUTPATIENT
Start: 2023-05-02

## 2023-05-02 NOTE — PATIENT INSTRUCTIONS
Prenatal Care  Prenatal care is health care during pregnancy. It helps you and your unborn baby (fetus) stay as healthy as possible. Prenatal care may be provided by a midwife, a family practice doctor, a mid-level practitioner (nurse practitioner or physician assistant), or a childbirth and pregnancy doctor (obstetrician).  How does this affect me?  During pregnancy, you will be closely monitored for any new conditions that might develop. To lower your risk of pregnancy complications, you and your health care provider will talk about any underlying conditions you have.  How does this affect my baby?  Early and consistent prenatal care increases the chance that your baby will be healthy during pregnancy. Prenatal care lowers the risk that your baby will be:  Born early (prematurely).  Smaller than expected at birth (small for gestational age).  What can I expect at the first prenatal care visit?  Your first prenatal care visit will likely be the longest. You should schedule your first prenatal care visit as soon as you know that you are pregnant. Your first visit is a good time to talk about any questions or concerns you have about pregnancy.  Medical history  At your visit, you and your health care provider will talk about your medical history, including:  Any past pregnancies.  Your family's medical history.  Medical history of the baby's father.  Any long-term (chronic) health conditions you have and how you manage them.  Any surgeries or procedures you have had.  Any current over-the-counter or prescription medicines, herbs, or supplements that you are taking.  Other factors that could pose a risk to your baby, including:  Exposure to harmful chemicals or radiation at work or at home.  Any substance use, including tobacco, alcohol, and drug use.  Your home setting and your stress levels, including:  Exposure to abuse or violence.  Household financial strain.  Your daily health habits, including diet and  exercise.  Tests and screenings  Your health care provider will:  Measure your weight, height, and blood pressure.  Do a physical exam, including a pelvic and breast exam.  Perform blood tests and urine tests to check for:  Urinary tract infection.  Sexually transmitted infections (STIs).  Low iron levels in your blood (anemia).  Blood type and certain proteins on red blood cells (Rh antibodies).  Infections and immunity to viruses, such as hepatitis B and rubella.  HIV (human immunodeficiency virus).  Discuss your options for genetic screening.  Tips about staying healthy  Your health care provider will also give you information about how to keep yourself and your baby healthy, including:  Nutrition and taking vitamins.  Physical activity.  How to manage pregnancy symptoms such as nausea and vomiting (morning sickness).  Infections and substances that may be harmful to your baby and how to avoid them.  Food safety.  Dental care.  Working.  Travel.  Warning signs to watch for and when to call your health care provider.  How often will I have prenatal care visits?  After your first prenatal care visit, you will have regular visits throughout your pregnancy. The visit schedule is often as follows:  Up to week 28 of pregnancy: once every 4 weeks.  28-36 weeks: once every 2 weeks.  After 36 weeks: every week until delivery.  Some women may have visits more or less often depending on any underlying health conditions and the health of the baby.  Keep all follow-up and prenatal care visits. This is important.  What happens during routine prenatal care visits?  Your health care provider will:  Measure your weight and blood pressure.  Check for fetal heart sounds.  Measure the height of your uterus in your abdomen (fundal height). This may be measured starting around week 20 of pregnancy.  Check the position of your baby inside your uterus.  Ask questions about your diet, sleeping patterns, and whether you can feel the baby  move.  Review warning signs to watch for and signs of labor.  Ask about any pregnancy symptoms you are having and how you are dealing with them. Symptoms may include:  Headaches.  Nausea and vomiting.  Vaginal discharge.  Swelling.  Fatigue.  Constipation.  Changes in your vision.  Feeling persistently sad or anxious.  Any discomfort, including back or pelvic pain.  Bleeding or spotting.  Make a list of questions to ask your health care provider at your routine visits.  What tests might I have during prenatal care visits?  You may have blood, urine, and imaging tests throughout your pregnancy, such as:  Urine tests to check for glucose, protein, or signs of infection.  Glucose tests to check for a form of diabetes that can develop during pregnancy (gestational diabetes mellitus). This is usually done around week 24 of pregnancy.  Ultrasounds to check your baby's growth and development, to check for birth defects, and to check your baby's well-being. These can also help to decide when you should deliver your baby.  A test to check for group B strep (GBS) infection. This is usually done around week 36 of pregnancy.  Genetic testing. This may include blood, fluid, or tissue sampling, or imaging tests, such as an ultrasound. Some genetic tests are done during the first trimester and some are done during the second trimester.  What else can I expect during prenatal care visits?  Your health care provider may recommend getting certain vaccines during pregnancy. These may include:  A yearly flu shot (annual influenza vaccine). This is especially important if you will be pregnant during flu season.  Tdap (tetanus, diphtheria, pertussis) vaccine. Getting this vaccine during pregnancy can protect your baby from whooping cough (pertussis) after birth. This vaccine may be recommended between weeks 27 and 36 of pregnancy.  A COVID-19 vaccine.  Later in your pregnancy, your health care provider may give you information  about:  Childbirth and breastfeeding classes.  Choosing a health care provider for your baby.  Umbilical cord banking.  Breastfeeding.  Birth control after your baby is born.  The hospital labor and delivery unit and how to set up a tour.  Registering at the hospital before you go into labor.  Where to find more information  Office on Women's Health: womenshealth.gov  American Pregnancy Association: americanpregnancy.org  March of Dimes: marchofdimes.org  Summary  Prenatal care helps you and your baby stay as healthy as possible during pregnancy.  Your first prenatal care visit will most likely be the longest.  You will have visits and tests throughout your pregnancy to monitor your health and your baby's health.  Bring a list of questions to your visits to ask your health care provider.  Make sure to keep all follow-up and prenatal care visits.  This information is not intended to replace advice given to you by your health care provider. Make sure you discuss any questions you have with your health care provider.  Document Revised: 09/30/2021 Document Reviewed: 09/30/2021  [employer] Patient Education © 2022 [employer]

## 2023-05-02 NOTE — PROGRESS NOTES
Initial ob visit     CC- Here for care of pregnancy        Glen Bey is a 28 y.o. female, , who presents for her first obstetrical visit.  Her last LMP was Patient's last menstrual period was 2023.. Her ZAK is Not found.. Current GA is Unknown.     Initial positive test date : 3/7/2023, UPT        Her periods are: every 3 weeks  Prior obstetric issues: none  Patient's past medical history is significant for: none.  Family history of genetic issues (includes FOB): Down syndrome and Autism  Prior infections concerning in pregnancy (Rash, fever in last 2 weeks): No  Varicella Hx - history of chicken pox  Prior testing for Cystic Fibrosis Carrier or Sickle Cell Trait- no  Prepregnancy BMI - Body mass index is 23.63 kg/m².  History of STD: yes GC/CHLAMYDIA  Hx of HSV for patient or partner: no  Ultrasound Today: yes    OB History    Para Term  AB Living   4 2 2 0 0 2   SAB IAB Ectopic Molar Multiple Live Births   0 0 0 0 0 2      # Outcome Date GA Lbr Kyle/2nd Weight Sex Delivery Anes PTL Lv   4 Current            3 Term 20 38w4d / 00:43 3630 g (8 lb) M Vag-Spont EPI N BOBBY   2 Term 17 38w3d / 00:22 3280 g (7 lb 3.7 oz) F Vag-Spont EPI N BOBBY   1                Obstetric Comments   FOB #1 : Pregnancy #1-2   2017-[facility]   2020 - [place]       Additional Pertinent History   Last Pap : 2022 Result: negative HPV: negative     Last Completed Pap Smear          PAP SMEAR (Every 3 Years) Next due on 2022  LIQUID-BASED PAP SMEAR, [facility] ([facility])    2017  Pap IG, Rfx HPV ASCU              History of abnormal Pap smear: no  Family history of uterine, colon, breast, or ovarian cancer: no  Feelings of Anxiety or Depression: no  Tobacco Usage?: No   Alcohol/Drug Use?: NO  Over the age of 35 at delivery: no  Desires Genetic Screening: Cell Free DNA  Flu Status: Already given in current flu season    PMH    Current Outpatient Medications:   •   Prenatal Vit-Fe Fumarate-FA (Prenatal 27-) 27-1 MG tablet tablet, Take  by mouth Daily., Disp: , Rfl:   •  promethazine (PHENERGAN) 12.5 MG tablet, Take 1 tablet by mouth Every 6 (Six) Hours As Needed for Nausea or Vomiting., Disp: 30 tablet, Rfl: 3     Past Medical History:   Diagnosis Date   • Anxiety     stopped med in 2019   • Chlamydia     treated for chlamydia in beginning of this pregnancy-2017   • Depression         Past Surgical History:   Procedure Laterality Date   • APPENDECTOMY         Review of Systems   Review of Systems  Patient Reports: Nausea and vomiting  Patient Denies: Spotting, Heavy bleeding, Cramping and Fatigue  All systems reviewed and otherwise normal.    I have reviewed and agree with the HPI, ROS, and historical information as entered above. Tristian Liu MD    /78   Wt 64.4 kg (142 lb)   LMP 2023   BMI 23.63 kg/m²     The additional following portions of the patient's history were reviewed and updated as appropriate: allergies, current medications, past family history, past medical history, past social history, past surgical history and problem list.    Physical Exam  General:  well developed; well nourished  no acute distress   Chest/Respiratory: No labored breathing, normal respiratory effort, normal appearance, no respiratory noises noted   Heart:  normal rate, regular rhythm,  no murmurs, rubs, or gallops   Thyroid: normal to inspection and palpation   Breasts:  Not performed.   Abdomen: soft, non-tender; no masses  no umbilical or inguinal hernias are present  no hepato-splenomegaly   Pelvis: Not performed.        Assessment and Plan    Problem List Items Addressed This Visit        Gravid and     Nausea and vomiting in pregnancy   Other Visit Diagnoses     Initial obstetric visit in first trimester    -  Primary    Relevant Orders    Obstetric Panel    Chlamydia trachomatis, Neisseria gonorrhoeae, PCR w/ confirmation - Urine, Urine, Clean Catch     HIV-1 / O / 2 Ag / Antibody 4th Generation    Urinalysis With Microscopic - Urine, Clean Catch    Urine Culture - Urine, Urine, Clean Catch    Urine Drug Screen - Urine, Clean Catch          1. Pregnancy at Unknown  2. Reviewed routine prenatal care with the office and educational materials given  3. Lab(s) Ordered  4. Discussed options for genetic testing including first trimester nuchal translucency screen, genetic disease carrier testing, quadruple screen, and NIPT  5. Medication(s) Ordered  6. Nausea/Vomiting - desires medication.  Options discussed and encouraged to be proactive to avoid constipation if on Zofran.  7. Patient is on Prenatal vitamins  Return in about 4 weeks (around 5/30/2023) for Routine prenatal care.      Tristian Liu MD  05/02/2023

## 2023-05-03 LAB — MED LIST OPTION NOT SELECTED: NORMAL

## 2023-05-05 LAB
ABO GROUP BLD: NORMAL
AMPHETAMINES UR QL SCN: NEGATIVE NG/ML
APPEARANCE UR: ABNORMAL
BACTERIA #/AREA URNS HPF: ABNORMAL /[HPF]
BACTERIA UR CULT: NORMAL
BACTERIA UR CULT: NORMAL
BARBITURATES UR QL SCN: NEGATIVE NG/ML
BASOPHILS # BLD AUTO: 0.1 X10E3/UL (ref 0–0.2)
BASOPHILS NFR BLD AUTO: 1 %
BENZODIAZ UR QL SCN: NEGATIVE NG/ML
BILIRUB UR QL STRIP: NEGATIVE
BLD GP AB SCN SERPL QL: NEGATIVE
BZE UR QL SCN: NEGATIVE NG/ML
C TRACH RRNA SPEC QL NAA+PROBE: NEGATIVE
CANNABINOIDS UR QL SCN: POSITIVE NG/ML
CASTS URNS QL MICRO: ABNORMAL /LPF
COLOR UR: YELLOW
CREAT UR-MCNC: 178 MG/DL (ref 20–300)
EOSINOPHIL # BLD AUTO: 0.2 X10E3/UL (ref 0–0.4)
EOSINOPHIL NFR BLD AUTO: 2 %
EPI CELLS #/AREA URNS HPF: >10 /HPF (ref 0–10)
ERYTHROCYTE [DISTWIDTH] IN BLOOD BY AUTOMATED COUNT: 11.9 % (ref 11.7–15.4)
GLUCOSE UR QL STRIP: NEGATIVE
HBV SURFACE AG SERPL QL IA: NEGATIVE
HCT VFR BLD AUTO: 38.2 % (ref 34–46.6)
HCV IGG SERPL QL IA: NON REACTIVE
HGB BLD-MCNC: 13 G/DL (ref 11.1–15.9)
HGB UR QL STRIP: NEGATIVE
HIV 1+2 AB+HIV1 P24 AG SERPL QL IA: NON REACTIVE
IMM GRANULOCYTES # BLD AUTO: 0.1 X10E3/UL (ref 0–0.1)
IMM GRANULOCYTES NFR BLD AUTO: 1 %
KETONES UR QL STRIP: NEGATIVE
LABORATORY COMMENT REPORT: ABNORMAL
LEUKOCYTE ESTERASE UR QL STRIP: ABNORMAL
LYMPHOCYTES # BLD AUTO: 1.9 X10E3/UL (ref 0.7–3.1)
LYMPHOCYTES NFR BLD AUTO: 20 %
MCH RBC QN AUTO: 32 PG (ref 26.6–33)
MCHC RBC AUTO-ENTMCNC: 34 G/DL (ref 31.5–35.7)
MCV RBC AUTO: 94 FL (ref 79–97)
METHADONE UR QL SCN: NEGATIVE NG/ML
MICRO URNS: ABNORMAL
MONOCYTES # BLD AUTO: 0.4 X10E3/UL (ref 0.1–0.9)
MONOCYTES NFR BLD AUTO: 5 %
MUCOUS THREADS URNS QL MICRO: PRESENT
N GONORRHOEA RRNA SPEC QL NAA+PROBE: NEGATIVE
NEUTROPHILS # BLD AUTO: 6.8 X10E3/UL (ref 1.4–7)
NEUTROPHILS NFR BLD AUTO: 71 %
NITRITE UR QL STRIP: NEGATIVE
OPIATES UR QL SCN: NEGATIVE NG/ML
OXYCODONE+OXYMORPHONE UR QL SCN: NEGATIVE NG/ML
PCP UR QL: NEGATIVE NG/ML
PH UR STRIP: 7 [PH] (ref 5–7.5)
PH UR: 6.9 [PH] (ref 4.5–8.9)
PLATELET # BLD AUTO: 365 X10E3/UL (ref 150–450)
PROPOXYPH UR QL SCN: NEGATIVE NG/ML
PROT UR QL STRIP: ABNORMAL
RBC # BLD AUTO: 4.06 X10E6/UL (ref 3.77–5.28)
RBC #/AREA URNS HPF: ABNORMAL /HPF (ref 0–2)
RH BLD: POSITIVE
RPR SER QL: NON REACTIVE
RUBV IGG SERPL IA-ACNC: 2.47 INDEX
SP GR UR STRIP: 1.02 (ref 1–1.03)
UROBILINOGEN UR STRIP-MCNC: 0.2 MG/DL (ref 0.2–1)
WBC # BLD AUTO: 9.5 X10E3/UL (ref 3.4–10.8)
WBC #/AREA URNS HPF: ABNORMAL /HPF (ref 0–5)

## 2023-05-08 ENCOUNTER — OFFICE VISIT (OUTPATIENT)
Dept: OBSTETRICS AND GYNECOLOGY | Facility: CLINIC | Age: 28
End: 2023-05-08
Payer: COMMERCIAL

## 2023-05-08 VITALS
SYSTOLIC BLOOD PRESSURE: 110 MMHG | BODY MASS INDEX: 23.66 KG/M2 | HEIGHT: 65 IN | DIASTOLIC BLOOD PRESSURE: 70 MMHG | WEIGHT: 142 LBS

## 2023-05-08 DIAGNOSIS — O02.1 MISSED ABORTION: Primary | ICD-10-CM

## 2023-05-08 NOTE — PROGRESS NOTES
"    Chief Complaint   Patient presents with   • Miscarriage          HPI  Glen Bey is a 28 y.o. female, , who presents with bleeding.  The amount of bleeding is described as heavy for 2 days with clots starting 2023. and U/S without fetal heart tones.    Recent Tests:  She had a urine pregnancy test that was done 4 weeks ago that was positive..    US today: Yes.  Findings showed non-viable IUP measuring 8 weeks gestation.  I have personally evaluated the U/S and agree with the findings. Tristian Liu MD  She has had prenatal care.  She denies associated abdominal or pelvic pain.. Her past medical history is non-contributory.  She does not have passage of tissue.  Rh Status: Positive  She reports no additional symptoms or complaints.    The additional following portions of the patient's history were reviewed and updated as appropriate: allergies and current medications.    Review of Systems   Constitutional: Negative.    Respiratory: Negative.    Cardiovascular: Negative.    Gastrointestinal: Negative.    Genitourinary: Positive for menstrual problem. Negative for pelvic pain.   Musculoskeletal: Negative.    Neurological: Negative.    Psychiatric/Behavioral: Negative.      All other systems reviewed and are negative.     I have reviewed and agree with the HPI, ROS, and historical information as entered above. Tristian Liu MD    Objective   /70   Ht 165.1 cm (65\")   Wt 64.4 kg (142 lb)   LMP 2023   BMI 23.63 kg/m²     Physical Exam  Vitals reviewed.   Constitutional:       Appearance: Normal appearance. She is normal weight.   HENT:      Head: Normocephalic and atraumatic.   Cardiovascular:      Rate and Rhythm: Normal rate and regular rhythm.   Pulmonary:      Effort: Pulmonary effort is normal.      Breath sounds: Normal breath sounds.   Abdominal:      General: Abdomen is flat. Bowel sounds are normal.      Palpations: Abdomen is soft.   Skin:     General: Skin is " warm and dry.   Neurological:      Mental Status: She is alert and oriented to person, place, and time.   Psychiatric:         Mood and Affect: Mood normal.         Behavior: Behavior normal.            Assessment and Plan    Problem List Items Addressed This Visit    None  Visit Diagnoses     Missed     -  Primary          1. Missed AB  2. Discussed treatment options today including observation, medical, and surgical management. She would like to proceed with suction D&C at [facility]. Risks of surgery explained including bleeding, infection, uterine perforation and damage to internal organs. Consent signed today and labs drawn.   Return in about 2 weeks (around 2023) for postoperative visit.      Tristian Liu MD  2023

## 2023-05-09 ENCOUNTER — OUTSIDE FACILITY SERVICE (OUTPATIENT)
Dept: OBSTETRICS AND GYNECOLOGY | Facility: CLINIC | Age: 28
End: 2023-05-09
Payer: COMMERCIAL

## 2023-05-09 ENCOUNTER — LAB REQUISITION (OUTPATIENT)
Dept: LAB | Facility: HOSPITAL | Age: 28
End: 2023-05-09
Payer: COMMERCIAL

## 2023-05-09 DIAGNOSIS — O02.1 MISSED ABORTION: ICD-10-CM

## 2023-05-09 DIAGNOSIS — Z98.890 STATUS POST DILATION AND CURETTAGE: Primary | ICD-10-CM

## 2023-05-09 PROCEDURE — 88305 TISSUE EXAM BY PATHOLOGIST: CPT

## 2023-05-09 RX ORDER — PROMETHAZINE HYDROCHLORIDE 12.5 MG/1
12.5 TABLET ORAL EVERY 6 HOURS PRN
Qty: 12 TABLET | Refills: 1 | Status: SHIPPED | OUTPATIENT
Start: 2023-05-09

## 2023-05-09 RX ORDER — OXYCODONE HYDROCHLORIDE AND ACETAMINOPHEN 5; 325 MG/1; MG/1
1 TABLET ORAL EVERY 6 HOURS PRN
Qty: 8 TABLET | Refills: 0 | Status: SHIPPED | OUTPATIENT
Start: 2023-05-09

## 2023-05-09 RX ORDER — IBUPROFEN 800 MG/1
800 TABLET ORAL EVERY 8 HOURS PRN
Qty: 30 TABLET | Refills: 1 | Status: SHIPPED | OUTPATIENT
Start: 2023-05-09

## 2023-05-11 LAB — REF LAB TEST METHOD: NORMAL

## 2023-06-14 ENCOUNTER — TELEPHONE (OUTPATIENT)
Dept: OBSTETRICS AND GYNECOLOGY | Facility: CLINIC | Age: 28
End: 2023-06-14
Payer: COMMERCIAL

## 2023-06-14 RX ORDER — NORGESTIMATE AND ETHINYL ESTRADIOL 0.25-0.035
1 KIT ORAL DAILY
Qty: 28 TABLET | Refills: 12 | Status: SHIPPED | OUTPATIENT
Start: 2023-06-14

## 2023-06-14 NOTE — TELEPHONE ENCOUNTER
Pt request to start birth control. Has had a recent miscarriage. Has not had post-op appt and needs one scheduled. Will contact pharmacy. Spoke with patient, bad phone connection but she said she had a neg UPT and wants to start OCP. Pt requesting sprintec.

## 2023-06-19 ENCOUNTER — OFFICE VISIT (OUTPATIENT)
Dept: OBSTETRICS AND GYNECOLOGY | Facility: CLINIC | Age: 28
End: 2023-06-19
Payer: COMMERCIAL

## 2023-06-19 VITALS
DIASTOLIC BLOOD PRESSURE: 80 MMHG | BODY MASS INDEX: 23.66 KG/M2 | WEIGHT: 142 LBS | HEIGHT: 65 IN | SYSTOLIC BLOOD PRESSURE: 102 MMHG

## 2023-06-19 DIAGNOSIS — Z30.011 ENCOUNTER FOR INITIAL PRESCRIPTION OF CONTRACEPTIVE PILLS: Primary | ICD-10-CM

## 2023-06-19 PROCEDURE — 1159F MED LIST DOCD IN RCRD: CPT | Performed by: OBSTETRICS & GYNECOLOGY

## 2023-06-19 PROCEDURE — 99024 POSTOP FOLLOW-UP VISIT: CPT | Performed by: OBSTETRICS & GYNECOLOGY

## 2023-06-19 PROCEDURE — 1160F RVW MEDS BY RX/DR IN RCRD: CPT | Performed by: OBSTETRICS & GYNECOLOGY

## 2023-06-19 RX ORDER — NORGESTIMATE AND ETHINYL ESTRADIOL 0.25-0.035
1 KIT ORAL DAILY
Qty: 28 TABLET | Refills: 12 | Status: SHIPPED | OUTPATIENT
Start: 2023-06-19

## 2023-06-19 NOTE — PROGRESS NOTES
OBGYN Postoperative Exam Note          Subjective   Chief Complaint   Patient presents with    Post-op     Glen Bey is a 28 y.o. year old  presenting to be seen for her post-operative visit. She is S/P suction D & C on 2023 at [facility] for  missed  at 8 weeks  . Currently she reports no problems with eating, bowel movements, voiding, or wound drainage and pain is well controlled.    The pathology results from her procedure are in Glen's record and are normal .      OTHER THINGS SHE WANTS TO DISCUSS TODAY:  Discuss starting Sprintec Birth Control      Current Outpatient Medications:     ibuprofen (ADVIL,MOTRIN) 800 MG tablet, Take 1 tablet by mouth Every 8 (Eight) Hours As Needed for Mild Pain., Disp: 30 tablet, Rfl: 1    norgestimate-ethinyl estradiol (ORTHO-CYCLEN) 0.25-35 MG-MCG per tablet, Take 1 tablet by mouth Daily., Disp: 28 tablet, Rfl: 12    norgestimate-ethinyl estradiol (Sprintec 28) 0.25-35 MG-MCG per tablet, Take 1 tablet by mouth Daily., Disp: 28 tablet, Rfl: 12    oxyCODONE-acetaminophen (Percocet) 5-325 MG per tablet, Take 1 tablet by mouth Every 6 (Six) Hours As Needed for Moderate Pain., Disp: 8 tablet, Rfl: 0    Prenatal Vit-Fe Fumarate-FA (Prenatal 27-1) 27-1 MG tablet tablet, Take  by mouth Daily., Disp: , Rfl:     promethazine (PHENERGAN) 12.5 MG tablet, Take 1 tablet by mouth Every 6 (Six) Hours As Needed for Nausea or Vomiting., Disp: 30 tablet, Rfl: 3    promethazine (PHENERGAN) 12.5 MG tablet, Take 1 tablet by mouth Every 6 (Six) Hours As Needed for Nausea., Disp: 12 tablet, Rfl: 1     Past Medical History:   Diagnosis Date    Anxiety     stopped med in 2019    Chlamydia     treated for chlamydia in beginning of this pregnancy-2017    Depression         Past Surgical History:   Procedure Laterality Date    APPENDECTOMY         The following portions of the patient's history were reviewed and updated as appropriate:current medications and allergies    Review of  "Systems   Constitutional: Negative.    Respiratory: Negative.     Cardiovascular: Negative.    Gastrointestinal: Negative.    Genitourinary: Negative.    Musculoskeletal: Negative.    Neurological: Negative.    Psychiatric/Behavioral: Negative.          Objective   /80   Ht 165.1 cm (65\")   Wt 64.4 kg (142 lb)   Breastfeeding No   BMI 23.63 kg/m²     Physical Exam  Vitals reviewed.   Constitutional:       Appearance: Normal appearance. She is normal weight.   HENT:      Head: Normocephalic and atraumatic.   Abdominal:      General: Abdomen is flat. Bowel sounds are normal.      Palpations: Abdomen is soft.   Skin:     General: Skin is warm and dry.   Neurological:      Mental Status: She is alert and oriented to person, place, and time.   Psychiatric:         Mood and Affect: Mood normal.         Behavior: Behavior normal.            Assessment   S/P suction D & C     Plan   May return to full activity with no restrictions  The importance of keeping all planned follow-up and taking all medications as prescribed was emphasized.  Will start on OCPs for contraception.   Return in about 1 year (around 6/19/2024) for Annual physical.              Tristian Liu MD  06/19/2023     "

## 2023-11-15 ENCOUNTER — LAB (OUTPATIENT)
Dept: OBSTETRICS AND GYNECOLOGY | Facility: CLINIC | Age: 28
End: 2023-11-15
Payer: COMMERCIAL

## 2023-11-15 DIAGNOSIS — Z32.00 UNCONFIRMED PREGNANCY: Primary | ICD-10-CM

## 2023-11-16 ENCOUNTER — TELEPHONE (OUTPATIENT)
Dept: OBSTETRICS AND GYNECOLOGY | Facility: CLINIC | Age: 28
End: 2023-11-16
Payer: COMMERCIAL

## 2023-11-16 DIAGNOSIS — Z32.01 POSITIVE URINE PREGNANCY TEST: Primary | ICD-10-CM

## 2023-11-16 LAB — HCG INTACT+B SERPL-ACNC: 30 MIU/ML

## 2023-11-16 NOTE — TELEPHONE ENCOUNTER
Called patient regarding HCG level of 30. She states she would be around 3-4 weeks. Had positive preg urine test a few days ago. Instructed she can come have another one drawn in a week. Order placed.

## 2023-11-22 ENCOUNTER — LAB (OUTPATIENT)
Dept: OBSTETRICS AND GYNECOLOGY | Facility: CLINIC | Age: 28
End: 2023-11-22
Payer: COMMERCIAL

## 2023-11-22 DIAGNOSIS — Z32.01 POSITIVE URINE PREGNANCY TEST: Primary | ICD-10-CM

## 2023-11-23 LAB — HCG INTACT+B SERPL-ACNC: 754 MIU/ML

## 2023-12-18 ENCOUNTER — OFFICE VISIT (OUTPATIENT)
Dept: OBSTETRICS AND GYNECOLOGY | Facility: CLINIC | Age: 28
End: 2023-12-18
Payer: COMMERCIAL

## 2023-12-18 VITALS
DIASTOLIC BLOOD PRESSURE: 78 MMHG | WEIGHT: 142 LBS | HEIGHT: 65 IN | SYSTOLIC BLOOD PRESSURE: 118 MMHG | BODY MASS INDEX: 23.66 KG/M2

## 2023-12-18 DIAGNOSIS — O20.0 THREATENED ABORTION: Primary | ICD-10-CM

## 2023-12-18 NOTE — PROGRESS NOTES
"    Chief Complaint   Patient presents with    Threatened Miscarriage          HPI  Glen Bey is a 28 y.o. female, , who presents with U/S without fetal heart tones and U/S with gestational sac only.    Recent Tests:  She had a urine pregnancy test that was done 3 weeks ago that was positive..    US today: Yes.  Findings showed GS with no fetal pole or yolk sac.  I have personally evaluated the U/S and agree with the findings. Tristian Liu MD  She has not had prenatal care.  She denies associated abdominal or pelvic pain.. Her past medical history is notable for spontaneous .  She does not have passage of tissue.  Rh Status: Positive  She reports no additional symptoms or complaints.    The additional following portions of the patient's history were reviewed and updated as appropriate: allergies and current medications.    Review of Systems   Constitutional: Negative.    Respiratory: Negative.     Cardiovascular: Negative.    Gastrointestinal:  Positive for nausea and vomiting.   Genitourinary: Negative.    Musculoskeletal: Negative.    Neurological: Negative.    Psychiatric/Behavioral: Negative.       All other systems reviewed and are negative.     I have reviewed and agree with the HPI, ROS, and historical information as entered above. Tristian Liu MD      Objective   /78   Ht 165.1 cm (65\")   Wt 64.4 kg (142 lb)   LMP 2023   BMI 23.63 kg/m²     Physical Exam  Vitals reviewed.   Constitutional:       Appearance: Normal appearance. She is normal weight.   Skin:     General: Skin is warm and dry.   Neurological:      Mental Status: She is alert and oriented to person, place, and time.   Psychiatric:         Mood and Affect: Mood normal.         Behavior: Behavior normal.            Assessment and Plan    Problem List Items Addressed This Visit    None  Visit Diagnoses       Threatened     -  Primary    Relevant Orders    CBC (No Diff)    ABO / Rh    HCG, " B-subunit, Quantitative    US Ob Transvaginal            Threatened AB  Repeat U/S in 1 week(s). Continue prenatal vitamin for now.   Continue promethazine for nausea.   Return in about 1 week (around 12/25/2023) for visit and follow up US.      Tristian Liu MD  12/18/2023

## 2023-12-19 LAB
ABO GROUP BLD: NORMAL
ERYTHROCYTE [DISTWIDTH] IN BLOOD BY AUTOMATED COUNT: 11.6 % (ref 11.7–15.4)
HCG INTACT+B SERPL-ACNC: NORMAL MIU/ML
HCT VFR BLD AUTO: 38.2 % (ref 34–46.6)
HGB BLD-MCNC: 13.1 G/DL (ref 11.1–15.9)
MCH RBC QN AUTO: 31.6 PG (ref 26.6–33)
MCHC RBC AUTO-ENTMCNC: 34.3 G/DL (ref 31.5–35.7)
MCV RBC AUTO: 92 FL (ref 79–97)
PLATELET # BLD AUTO: 379 X10E3/UL (ref 150–450)
RBC # BLD AUTO: 4.14 X10E6/UL (ref 3.77–5.28)
RH BLD: POSITIVE
WBC # BLD AUTO: 10.7 X10E3/UL (ref 3.4–10.8)

## 2023-12-20 ENCOUNTER — TELEPHONE (OUTPATIENT)
Dept: OBSTETRICS AND GYNECOLOGY | Facility: CLINIC | Age: 28
End: 2023-12-20
Payer: COMMERCIAL

## 2023-12-20 NOTE — TELEPHONE ENCOUNTER
"Attempted to return patient's call. Left voice message to call us back.      Dr. Liu\"s result note: \"Please call the patient regarding her abnormal result. HCG is elevated which would be good, but I would expect to see a fetal pole by now.  Give her precautions that she may start bleeding, but if not, she has f/u US visit in [place] next week and if no fetal pole can set up for medical management or D&C next Thursday 28th.\"   "

## 2023-12-26 ENCOUNTER — OFFICE VISIT (OUTPATIENT)
Dept: OBSTETRICS AND GYNECOLOGY | Facility: CLINIC | Age: 28
End: 2023-12-26
Payer: COMMERCIAL

## 2023-12-26 VITALS
DIASTOLIC BLOOD PRESSURE: 86 MMHG | SYSTOLIC BLOOD PRESSURE: 126 MMHG | WEIGHT: 145 LBS | HEIGHT: 65 IN | BODY MASS INDEX: 24.16 KG/M2

## 2023-12-26 DIAGNOSIS — O02.1 MISSED ABORTION: Primary | ICD-10-CM

## 2023-12-26 RX ORDER — MISOPROSTOL 200 UG/1
TABLET ORAL
Qty: 8 TABLET | Refills: 0 | Status: SHIPPED | OUTPATIENT
Start: 2023-12-26

## 2023-12-26 NOTE — PROGRESS NOTES
"    Chief Complaint   Patient presents with    Threatened Miscarriage          HPI  Glen Bey is a 28 y.o. female, , who presents for follow up  with  U/S with Gestational Sac uncertain and without fetal heart tones .    Recent Tests:  She had a urine pregnancy test that was done 4 weeks ago that was positive..    US today: Yes.  Findings showed GS only measuring 8 weeks, no embryo.  I have personally evaluated the U/S and agree with the findings. Sheridan Flynn, APRN  She has had prenatal care.  She denies associated abdominal or pelvic pain.. Her past medical history is non-contributory.  She does not have passage of tissue.  Rh Status: Positive  She reports no additional symptoms or complaints.    The additional following portions of the patient's history were reviewed and updated as appropriate: allergies, current medications, past family history, past medical history, past social history, past surgical history, and problem list.    Review of Systems   Constitutional: Negative.    Respiratory: Negative.     Cardiovascular: Negative.    Gastrointestinal: Negative.    Genitourinary: Negative.    Psychiatric/Behavioral: Negative.     All other systems reviewed and are negative.    All other systems reviewed and are negative.     I have reviewed and agree with the HPI, ROS, and historical information as entered above. Sheridan Flynn, APRN      Objective   /86   Ht 165.1 cm (65\")   Wt 65.8 kg (145 lb)   LMP 10/30/2023 (Exact Date)   BMI 24.13 kg/m²     Physical Exam  Constitutional:       Appearance: Normal appearance.   Pulmonary:      Effort: Pulmonary effort is normal.   Neurological:      Mental Status: She is alert.            Assessment and Plan    Problem List Items Addressed This Visit    None  Visit Diagnoses       Missed     -  Primary    Relevant Orders    US Ob Transvaginal          Blighted ovum confirmed with serial U/S today. Dr. Liu reviewed imaging and " confirmed. The patient was given the option of cytotec or D&C and opts for cytotec today. Instructions and bleeding precautions were reviewed. She is RH +    Missed AB/blighted ovum  Repeat U/S in 1 week(s).  Pelvic Rest.  No douching, intercourse or use of tampons.  Cytotec prescribed.  Bleeding precautions reviewed.  Return in about 1 week (around 1/2/2024) for U/S F/U MAB.    Counseling was given to patient and family for the following topics: diagnostic results, prognosis, and importance of treatment compliance . Total time of the encounter was 20 minutes and 10 minutes was spend counseling.      Sheridan Flynn, APRN  12/26/2023

## 2024-01-02 ENCOUNTER — OFFICE VISIT (OUTPATIENT)
Dept: OBSTETRICS AND GYNECOLOGY | Facility: CLINIC | Age: 29
End: 2024-01-02
Payer: COMMERCIAL

## 2024-01-02 ENCOUNTER — TELEPHONE (OUTPATIENT)
Dept: OBSTETRICS AND GYNECOLOGY | Facility: CLINIC | Age: 29
End: 2024-01-02

## 2024-01-02 ENCOUNTER — TELEPHONE (OUTPATIENT)
Dept: OBSTETRICS AND GYNECOLOGY | Facility: CLINIC | Age: 29
End: 2024-01-02
Payer: COMMERCIAL

## 2024-01-02 VITALS
HEIGHT: 65 IN | WEIGHT: 142 LBS | SYSTOLIC BLOOD PRESSURE: 114 MMHG | RESPIRATION RATE: 18 BRPM | DIASTOLIC BLOOD PRESSURE: 70 MMHG | BODY MASS INDEX: 23.66 KG/M2

## 2024-01-02 DIAGNOSIS — O02.1 MISSED ABORTION: Primary | ICD-10-CM

## 2024-01-02 PROCEDURE — 99213 OFFICE O/P EST LOW 20 MIN: CPT | Performed by: NURSE PRACTITIONER

## 2024-01-02 PROCEDURE — 1159F MED LIST DOCD IN RCRD: CPT | Performed by: NURSE PRACTITIONER

## 2024-01-02 PROCEDURE — 1160F RVW MEDS BY RX/DR IN RCRD: CPT | Performed by: NURSE PRACTITIONER

## 2024-01-02 NOTE — TELEPHONE ENCOUNTER
Provider: Tristian Liu MD    Caller: LELE MORA    Relationship to Patient: SELF    Pharmacy:     Phone Number: [phone]    Reason for Call: RETUNING CALL    When was the patient last seen: 01.02.24    PATIENT IS RETURNING CALL, NO NOTES IN CHART TO WHO CALLED PATIENT.    PATIENT CAN BE REACHED [phone]      THANK YOU

## 2024-01-02 NOTE — TELEPHONE ENCOUNTER
Returned patient's call. States someone already called her back with information about scheduling surgery.

## 2024-01-02 NOTE — TELEPHONE ENCOUNTER
Attempted to call patient. NARGIS Frazier had tried to call her in regards to coming back to the office to sign a consent for her scheduled D&C. She forgot to have her sign it while she was in the office. Voicemail left for pt to call back. She needs to come back today or tomorrow as her surgery is scheduled for Thurs 1/4/23.

## 2024-01-02 NOTE — PROGRESS NOTES
"    Chief Complaint   Patient presents with    Miscarriage            HPI  Glen Bey is a 28 y.o. female, , who presents for follow up on a Threatened AB. At her last visit she chose to use cytotec for treatment. Since then she has passage of tissue. Her bleeding today is spotting. She denies associated abdominal or pelvic pain.. Her past medical history is non-contributory. She reports no additional symptoms or complaints.    Recent Tests:  US today: yes  Rh Status: Positive      The additional following portions of the patient's history were reviewed and updated as appropriate: allergies, current medications, past family history, past medical history, past social history, past surgical history, and problem list.    Review of Systems   Constitutional: Negative.    Respiratory: Negative.     Cardiovascular: Negative.    Gastrointestinal: Negative.    Genitourinary:  Positive for vaginal bleeding.         I have reviewed and agree with the HPI, ROS, and historical information as entered above. Sheridan Flynn, APRN      Objective   /70   Resp 18   Ht 165.1 cm (65\")   Wt 64.4 kg (142 lb)   LMP 10/30/2023 (Exact Date)   Breastfeeding No   BMI 23.63 kg/m²     Physical Exam  Constitutional:       Appearance: Normal appearance.   Pulmonary:      Effort: Pulmonary effort is normal.   Neurological:      Mental Status: She is alert.          Assessment and Plan    Problem List Items Addressed This Visit    None  Visit Diagnoses       Missed     -  Primary            Blighted Ovum and Missed AB  Pelvic Rest.  No douching, intercourse or use of tampons.  Schedule D&C.  Return for D&C  @ 1 pm.  Consent reviewed and signed today.     Counseling was given to patient for the following topics: diagnostic results and risks and benefits of treatment options . Total time of the encounter was 20 minutes and 10 minutes was spend counseling.      Sheridan Flynn, APRN  2024   "

## 2024-01-04 ENCOUNTER — OUTSIDE FACILITY SERVICE (OUTPATIENT)
Dept: OBSTETRICS AND GYNECOLOGY | Facility: CLINIC | Age: 29
End: 2024-01-04
Payer: COMMERCIAL

## 2024-01-04 ENCOUNTER — LAB REQUISITION (OUTPATIENT)
Dept: LAB | Facility: HOSPITAL | Age: 29
End: 2024-01-04
Payer: COMMERCIAL

## 2024-01-04 DIAGNOSIS — Z98.890 STATUS POST DILATION AND CURETTAGE: Primary | ICD-10-CM

## 2024-01-04 DIAGNOSIS — O02.1 MISSED ABORTION: ICD-10-CM

## 2024-01-04 PROCEDURE — 88305 TISSUE EXAM BY PATHOLOGIST: CPT | Performed by: OBSTETRICS & GYNECOLOGY

## 2024-01-04 RX ORDER — IBUPROFEN 800 MG/1
800 TABLET ORAL EVERY 8 HOURS PRN
Qty: 30 TABLET | Refills: 0 | Status: SHIPPED | OUTPATIENT
Start: 2024-01-04

## 2024-01-04 RX ORDER — OXYCODONE HYDROCHLORIDE AND ACETAMINOPHEN 5; 325 MG/1; MG/1
1 TABLET ORAL EVERY 8 HOURS PRN
Qty: 8 TABLET | Refills: 0 | Status: SHIPPED | OUTPATIENT
Start: 2024-01-04

## 2024-01-04 RX ORDER — PROMETHAZINE HYDROCHLORIDE 12.5 MG/1
12.5 TABLET ORAL EVERY 6 HOURS PRN
Qty: 12 TABLET | Refills: 1 | Status: SHIPPED | OUTPATIENT
Start: 2024-01-04

## 2024-01-05 LAB
CYTO UR: NORMAL
LAB AP CASE REPORT: NORMAL
LAB AP CLINICAL INFORMATION: NORMAL
PATH REPORT.FINAL DX SPEC: NORMAL
PATH REPORT.GROSS SPEC: NORMAL

## 2024-01-06 ENCOUNTER — APPOINTMENT (OUTPATIENT)
Dept: GENERAL RADIOLOGY | Facility: HOSPITAL | Age: 29
End: 2024-01-06
Payer: COMMERCIAL

## 2024-01-06 ENCOUNTER — APPOINTMENT (OUTPATIENT)
Dept: ULTRASOUND IMAGING | Facility: HOSPITAL | Age: 29
End: 2024-01-06
Payer: COMMERCIAL

## 2024-01-06 ENCOUNTER — HOSPITAL ENCOUNTER (EMERGENCY)
Facility: HOSPITAL | Age: 29
Discharge: HOME | End: 2024-01-06
Attending: EMERGENCY MEDICINE
Payer: COMMERCIAL

## 2024-01-06 VITALS
RESPIRATION RATE: 16 BRPM | BODY MASS INDEX: 23.56 KG/M2 | SYSTOLIC BLOOD PRESSURE: 128 MMHG | HEIGHT: 64 IN | TEMPERATURE: 99.8 F | WEIGHT: 138 LBS | HEART RATE: 97 BPM | OXYGEN SATURATION: 99 % | DIASTOLIC BLOOD PRESSURE: 86 MMHG

## 2024-01-06 DIAGNOSIS — R10.9 ACUTE ABDOMINAL PAIN: Primary | ICD-10-CM

## 2024-01-06 DIAGNOSIS — R50.9 FEVER, UNSPECIFIED FEVER CAUSE: ICD-10-CM

## 2024-01-06 LAB
ALBUMIN SERPL-MCNC: 4.5 G/DL (ref 3.5–5.2)
ALBUMIN/GLOB SERPL: 1.6 G/DL
ALP SERPL-CCNC: 47 U/L (ref 39–117)
ALT SERPL W P-5'-P-CCNC: 8 U/L (ref 1–33)
ANION GAP SERPL CALCULATED.3IONS-SCNC: 9 MMOL/L (ref 5–15)
AST SERPL-CCNC: 13 U/L (ref 1–32)
B PARAPERT DNA SPEC QL NAA+PROBE: NOT DETECTED
B PERT DNA SPEC QL NAA+PROBE: NOT DETECTED
BACTERIA UR QL AUTO: ABNORMAL /HPF
BASOPHILS # BLD AUTO: 0.05 10*3/MM3 (ref 0–0.2)
BASOPHILS NFR BLD AUTO: 0.5 % (ref 0–1.5)
BILIRUB SERPL-MCNC: 0.5 MG/DL (ref 0–1.2)
BILIRUB UR QL STRIP: NEGATIVE
BUN SERPL-MCNC: 8 MG/DL (ref 6–20)
BUN/CREAT SERPL: 13.8 (ref 7–25)
C PNEUM DNA NPH QL NAA+NON-PROBE: NOT DETECTED
CALCIUM SPEC-SCNC: 9.6 MG/DL (ref 8.6–10.5)
CHLORIDE SERPL-SCNC: 104 MMOL/L (ref 98–107)
CLARITY UR: CLEAR
CO2 SERPL-SCNC: 28 MMOL/L (ref 22–29)
COLOR UR: YELLOW
CREAT SERPL-MCNC: 0.58 MG/DL (ref 0.57–1)
D-LACTATE SERPL-SCNC: 2 MMOL/L (ref 0.5–2)
DEPRECATED RDW RBC AUTO: 40.7 FL (ref 37–54)
EGFRCR SERPLBLD CKD-EPI 2021: 127.4 ML/MIN/1.73
EOSINOPHIL # BLD AUTO: 0.07 10*3/MM3 (ref 0–0.4)
EOSINOPHIL NFR BLD AUTO: 0.8 % (ref 0.3–6.2)
ERYTHROCYTE [DISTWIDTH] IN BLOOD BY AUTOMATED COUNT: 12 % (ref 12.3–15.4)
FLUAV SUBTYP SPEC NAA+PROBE: NOT DETECTED
FLUBV RNA ISLT QL NAA+PROBE: NOT DETECTED
GLOBULIN UR ELPH-MCNC: 2.9 GM/DL
GLUCOSE SERPL-MCNC: 95 MG/DL (ref 65–99)
GLUCOSE UR STRIP-MCNC: NEGATIVE MG/DL
HADV DNA SPEC NAA+PROBE: NOT DETECTED
HCOV 229E RNA SPEC QL NAA+PROBE: NOT DETECTED
HCOV HKU1 RNA SPEC QL NAA+PROBE: NOT DETECTED
HCOV NL63 RNA SPEC QL NAA+PROBE: NOT DETECTED
HCOV OC43 RNA SPEC QL NAA+PROBE: NOT DETECTED
HCT VFR BLD AUTO: 36.3 % (ref 34–46.6)
HGB BLD-MCNC: 12.8 G/DL (ref 12–15.9)
HGB UR QL STRIP.AUTO: ABNORMAL
HMPV RNA NPH QL NAA+NON-PROBE: NOT DETECTED
HPIV1 RNA ISLT QL NAA+PROBE: NOT DETECTED
HPIV2 RNA SPEC QL NAA+PROBE: NOT DETECTED
HPIV3 RNA NPH QL NAA+PROBE: NOT DETECTED
HPIV4 P GENE NPH QL NAA+PROBE: NOT DETECTED
HYALINE CASTS UR QL AUTO: ABNORMAL /LPF
IMM GRANULOCYTES # BLD AUTO: 0.04 10*3/MM3 (ref 0–0.05)
IMM GRANULOCYTES NFR BLD AUTO: 0.4 % (ref 0–0.5)
KETONES UR QL STRIP: NEGATIVE
LEUKOCYTE ESTERASE UR QL STRIP.AUTO: NEGATIVE
LYMPHOCYTES # BLD AUTO: 0.89 10*3/MM3 (ref 0.7–3.1)
LYMPHOCYTES NFR BLD AUTO: 9.6 % (ref 19.6–45.3)
M PNEUMO IGG SER IA-ACNC: NOT DETECTED
MCH RBC QN AUTO: 32.7 PG (ref 26.6–33)
MCHC RBC AUTO-ENTMCNC: 35.3 G/DL (ref 31.5–35.7)
MCV RBC AUTO: 92.8 FL (ref 79–97)
MONOCYTES # BLD AUTO: 0.49 10*3/MM3 (ref 0.1–0.9)
MONOCYTES NFR BLD AUTO: 5.3 % (ref 5–12)
NEUTROPHILS NFR BLD AUTO: 7.76 10*3/MM3 (ref 1.7–7)
NEUTROPHILS NFR BLD AUTO: 83.4 % (ref 42.7–76)
NITRITE UR QL STRIP: NEGATIVE
NRBC BLD AUTO-RTO: 0 /100 WBC (ref 0–0.2)
PH UR STRIP.AUTO: 7 [PH] (ref 5–8)
PLATELET # BLD AUTO: 340 10*3/MM3 (ref 140–450)
PMV BLD AUTO: 9.4 FL (ref 6–12)
POTASSIUM SERPL-SCNC: 3.7 MMOL/L (ref 3.5–5.2)
PROT SERPL-MCNC: 7.4 G/DL (ref 6–8.5)
PROT UR QL STRIP: NEGATIVE
QT INTERVAL: 282 MS
QTC INTERVAL: 383 MS
RBC # BLD AUTO: 3.91 10*6/MM3 (ref 3.77–5.28)
RBC # UR STRIP: ABNORMAL /HPF
REF LAB TEST METHOD: ABNORMAL
RHINOVIRUS RNA SPEC NAA+PROBE: NOT DETECTED
RSV RNA NPH QL NAA+NON-PROBE: NOT DETECTED
SARS-COV-2 RNA NPH QL NAA+NON-PROBE: NOT DETECTED
SODIUM SERPL-SCNC: 141 MMOL/L (ref 136–145)
SP GR UR STRIP: 1.01 (ref 1–1.03)
SQUAMOUS #/AREA URNS HPF: ABNORMAL /HPF
UROBILINOGEN UR QL STRIP: ABNORMAL
WBC # UR STRIP: ABNORMAL /HPF
WBC NRBC COR # BLD AUTO: 9.3 10*3/MM3 (ref 3.4–10.8)

## 2024-01-06 PROCEDURE — 83605 ASSAY OF LACTIC ACID: CPT | Performed by: EMERGENCY MEDICINE

## 2024-01-06 PROCEDURE — 99284 EMERGENCY DEPT VISIT MOD MDM: CPT

## 2024-01-06 PROCEDURE — 85025 COMPLETE CBC W/AUTO DIFF WBC: CPT | Performed by: EMERGENCY MEDICINE

## 2024-01-06 PROCEDURE — 87040 BLOOD CULTURE FOR BACTERIA: CPT | Performed by: EMERGENCY MEDICINE

## 2024-01-06 PROCEDURE — 80053 COMPREHEN METABOLIC PANEL: CPT | Performed by: EMERGENCY MEDICINE

## 2024-01-06 PROCEDURE — 81001 URINALYSIS AUTO W/SCOPE: CPT | Performed by: EMERGENCY MEDICINE

## 2024-01-06 PROCEDURE — 93005 ELECTROCARDIOGRAM TRACING: CPT | Performed by: EMERGENCY MEDICINE

## 2024-01-06 PROCEDURE — 0202U NFCT DS 22 TRGT SARS-COV-2: CPT | Performed by: EMERGENCY MEDICINE

## 2024-01-06 PROCEDURE — 71045 X-RAY EXAM CHEST 1 VIEW: CPT

## 2024-01-06 PROCEDURE — 76830 TRANSVAGINAL US NON-OB: CPT

## 2024-01-06 PROCEDURE — 36415 COLL VENOUS BLD VENIPUNCTURE: CPT

## 2024-01-06 RX ORDER — METRONIDAZOLE 500 MG/1
500 TABLET ORAL ONCE
Status: COMPLETED | OUTPATIENT
Start: 2024-01-06 | End: 2024-01-06

## 2024-01-06 RX ORDER — METRONIDAZOLE 500 MG/1
500 TABLET ORAL 3 TIMES DAILY
Qty: 21 TABLET | Refills: 0 | Status: SHIPPED | OUTPATIENT
Start: 2024-01-06 | End: 2024-01-13

## 2024-01-06 RX ORDER — AMOXICILLIN AND CLAVULANATE POTASSIUM 875; 125 MG/1; MG/1
1 TABLET, FILM COATED ORAL ONCE
Status: COMPLETED | OUTPATIENT
Start: 2024-01-06 | End: 2024-01-06

## 2024-01-06 RX ORDER — AMOXICILLIN AND CLAVULANATE POTASSIUM 875; 125 MG/1; MG/1
1 TABLET, FILM COATED ORAL EVERY 12 HOURS
Qty: 14 TABLET | Refills: 0 | Status: SHIPPED | OUTPATIENT
Start: 2024-01-06 | End: 2024-01-13

## 2024-01-06 RX ORDER — OXYCODONE HYDROCHLORIDE AND ACETAMINOPHEN 5; 325 MG/1; MG/1
1 TABLET ORAL EVERY 6 HOURS PRN
Qty: 12 TABLET | Refills: 0 | Status: SHIPPED | OUTPATIENT
Start: 2024-01-06

## 2024-01-06 RX ADMIN — METRONIDAZOLE 500 MG: 500 TABLET ORAL at 20:15

## 2024-01-06 RX ADMIN — AMOXICILLIN AND CLAVULANATE POTASSIUM 1 TABLET: 875; 125 TABLET, FILM COATED ORAL at 20:15

## 2024-01-06 NOTE — Clinical Note
[facility] EMERGENCY DEPARTMENT  [address]  [facility] [address]  Phone: [phone]    Glen Bey was seen and treated in our emergency department on 1/6/2024.  She may return to work on 01/10/2024.         Thank you for choosing [facility].    Grace Cabral MD

## 2024-01-07 NOTE — DISCHARGE INSTRUCTIONS
Take Percocet as needed for severe pain not controlled by Tylenol or ibuprofen.    Take antibiotics as prescribed.    Reach out to Dr. Liu to obtain follow-up within the next week.    Return to the ER with any further concern.

## 2024-01-07 NOTE — ED PROVIDER NOTES
Subjective   History of Present Illness  28-year-old female who presents for evaluation abdominal cramping with associated fever.  The patient had a D&C performed 2 days ago by Dr. Howard of the OB service due to blighted ovum and miscarriage.  The patient reports that within the last day she began to have crampy abdominal pain in the days she had a fever that was up to 102.6.  She denies any upper respiratory congestion, sore throat, rhinorrhea.  No chest pain cough or shortness of breath.  No sick contacts.  She denies dysuria, frequency, or urgency.  No change in bowel function clued no diarrhea or blood in the stool.  She did take ibuprofen roughly 2-1/2 hours prior to arrival.  Previous abdominal surgeries include appendectomy.  No other acute complaints.      Review of Systems   Constitutional:  Positive for activity change, appetite change and fever. Negative for chills and fatigue.   HENT:  Negative for congestion, ear pain, postnasal drip, sinus pressure and sore throat.    Eyes:  Negative for pain, redness and visual disturbance.   Respiratory:  Negative for cough, chest tightness and shortness of breath.    Cardiovascular:  Negative for chest pain, palpitations and leg swelling.   Gastrointestinal:  Positive for abdominal pain. Negative for anal bleeding, blood in stool, diarrhea, nausea and vomiting.   Endocrine: Negative for polydipsia and polyuria.   Genitourinary:  Negative for difficulty urinating, dysuria, frequency and urgency.   Musculoskeletal:  Negative for arthralgias, back pain and neck pain.   Skin:  Negative for pallor and rash.   Allergic/Immunologic: Negative for environmental allergies and immunocompromised state.   Neurological:  Negative for dizziness, weakness and headaches.   Hematological:  Negative for adenopathy.   Psychiatric/Behavioral:  Negative for confusion, self-injury and suicidal ideas. The patient is not nervous/anxious.    All other systems reviewed and are  negative.      Past Medical History:   Diagnosis Date    Anxiety     stopped med in 2019    Chlamydia     treated for chlamydia in beginning of this pregnancy-2017    Depression        No Known Allergies    Past Surgical History:   Procedure Laterality Date    APPENDECTOMY  2005       Family History   Problem Relation Age of Onset    Hypertension Mother     Hypertension Father     Heart disease Maternal Grandmother     Diabetes Maternal Grandmother     Heart disease Maternal Grandfather     Hypertension Paternal Grandmother     Hypertension Paternal Grandfather     Mental illness Other        Social History     Socioeconomic History    Marital status: Single   Tobacco Use    Smoking status: Never    Smokeless tobacco: Never   Vaping Use    Vaping Use: Never used   Substance and Sexual Activity    Alcohol use: Not Currently    Drug use: Never    Sexual activity: Yes     Partners: Male           Objective   Physical Exam  Vitals and nursing note reviewed.   Constitutional:       General: She is not in acute distress.     Appearance: Normal appearance. She is well-developed. She is ill-appearing. She is not toxic-appearing or diaphoretic.      Comments: Warm to touch   HENT:      Head: Normocephalic and atraumatic.      Right Ear: External ear normal.      Left Ear: External ear normal.      Nose: Nose normal.   Eyes:      General: Lids are normal.      Pupils: Pupils are equal, round, and reactive to light.   Neck:      Trachea: No tracheal deviation.   Cardiovascular:      Rate and Rhythm: Regular rhythm. Tachycardia present.      Pulses: No decreased pulses.      Heart sounds: Normal heart sounds. No murmur heard.     No friction rub. No gallop.   Pulmonary:      Effort: Pulmonary effort is normal. No respiratory distress.      Breath sounds: Normal breath sounds. No decreased breath sounds, wheezing, rhonchi or rales.   Abdominal:      General: Bowel sounds are normal.      Palpations: Abdomen is soft.       Tenderness: There is no abdominal tenderness in the right lower quadrant, periumbilical area and suprapubic area. There is no guarding or rebound.   Musculoskeletal:         General: No deformity. Normal range of motion.      Cervical back: Normal range of motion and neck supple.   Lymphadenopathy:      Cervical: No cervical adenopathy.   Skin:     General: Skin is warm and dry.      Findings: No rash.   Neurological:      Mental Status: She is alert and oriented to person, place, and time.      Cranial Nerves: No cranial nerve deficit.      Sensory: No sensory deficit.   Psychiatric:         Speech: Speech normal.         Behavior: Behavior normal.         Thought Content: Thought content normal.         Judgment: Judgment normal.         Procedures           ED Course                                             Medical Decision Making  Differential diagnosis includes endometritis, urinary tract infection, viral illness, pneumonia, other unspecified etiology.    Labs show no white count, normal kidney function, no significant electrolyte abnormalities.    Urine shows large blood but no signs of infection.  Viral respiratory panel was negative.    Chest x-ray shows no acute disease.  Transvaginal ultrasoundShows heterogeneous endometrium which may reflect scattered blood products.  Report sick centimeter thickness of the endometrial stripe, no obvious signs of retained products of conception.    These findings were discussed with Dr. Noland of OB.  He recommends outpatient follow-up and recommends a course of antibiotics.    The patient will be advised to reach out to Dr. Liu for follow-up and return to the ER with any further concern.      Problems Addressed:  Acute abdominal pain: complicated acute illness or injury with systemic symptoms  Fever, unspecified fever cause: complicated acute illness or injury with systemic symptoms    Amount and/or Complexity of Data Reviewed  Independent Historian:  friend  External Data Reviewed: labs, radiology and notes.  Labs: ordered. Decision-making details documented in ED Course.  Radiology: ordered and independent interpretation performed. Decision-making details documented in ED Course.  ECG/medicine tests: ordered and independent interpretation performed. Decision-making details documented in ED Course.    Risk  Prescription drug management.        Final diagnoses:   Acute abdominal pain   Fever, unspecified fever cause       ED Disposition  ED Disposition       ED Disposition   Discharge    Condition   Stable    Comment   --               Tristian Liu MD  [address]  [address]  [facility] [address]  [phone]    Schedule an appointment as soon as possible for a visit       Hallie Watson PA  [address]  [address]  [facility] [address] [phone]    In 1 week           Medication List        New Prescriptions      amoxicillin-clavulanate 875-125 MG per tablet  Commonly known as: AUGMENTIN  Take 1 tablet by mouth Every 12 (Twelve) Hours for 7 days.     metroNIDAZOLE 500 MG tablet  Commonly known as: FLAGYL  Take 1 tablet by mouth 3 (Three) Times a Day for 7 days.            Changed      * oxyCODONE-acetaminophen 5-325 MG per tablet  Commonly known as: Percocet  Take 1 tablet by mouth Every 8 (Eight) Hours As Needed for Moderate Pain.  What changed: Another medication with the same name was added. Make sure you understand how and when to take each.     * oxyCODONE-acetaminophen 5-325 MG per tablet  Commonly known as: PERCOCET  Take 1 tablet by mouth Every 6 (Six) Hours As Needed for Moderate Pain or Severe Pain for up to 12 doses.  What changed: You were already taking a medication with the same name, and this prescription was added. Make sure you understand how and when to take each.           * This list has 2 medication(s) that are the same as other medications prescribed for you. Read the directions carefully, and ask your doctor or other  care provider to review them with you.                   Where to Get Your Medications        These medications were sent to [facility]/pharmacy #[id number] - [place], [place] - [address] AT [facility] OF [facility] - [phone] [facility] [phone] [address] [address]      Hours: 24-hours Phone: [phone]   amoxicillin-clavulanate 875-125 MG per tablet  metroNIDAZOLE 500 MG tablet  oxyCODONE-acetaminophen 5-325 MG per tablet            Grace Cabral MD  01/06/24 2005

## 2024-01-11 LAB
BACTERIA SPEC AEROBE CULT: NORMAL
BACTERIA SPEC AEROBE CULT: NORMAL

## 2024-02-02 ENCOUNTER — TELEPHONE (OUTPATIENT)
Dept: OBSTETRICS AND GYNECOLOGY | Facility: CLINIC | Age: 29
End: 2024-02-02
Payer: COMMERCIAL

## 2024-02-02 DIAGNOSIS — Z32.00 UNCONFIRMED PREGNANCY: Primary | ICD-10-CM

## 2024-02-02 NOTE — TELEPHONE ENCOUNTER
Patient calling with +UPT.  Patient had suction D&C 1/4/2024.  She reports she began to feel nauseous and took a UPT, that was positive.  She admits to unprotected intercourse.  Reviewed with Cole- patient needs serial HCGs to ensure new pregnancy vs previous.  Patient verified and voiced understanding.  Orders placed.

## 2024-04-01 DIAGNOSIS — F41.8 DEPRESSION WITH ANXIETY: Primary | ICD-10-CM

## 2024-04-01 RX ORDER — FLUOXETINE HYDROCHLORIDE 20 MG/1
20 CAPSULE ORAL DAILY
Qty: 30 CAPSULE | Refills: 2 | Status: SHIPPED | OUTPATIENT
Start: 2024-04-01

## 2024-04-01 NOTE — TELEPHONE ENCOUNTER
Pt stated she has been taking the medication for the las 3-4 weeks and previously stopped taking them for 1 year but wasn't doing well and that's when she started taking the medication she already had at home and has enough to do until about the third week of Apri and is doing much better since she started them again

## 2024-04-01 NOTE — TELEPHONE ENCOUNTER
Please call. Medication not active in her med list I have not seen this patient since 2022. Also referred to behavioral health at that time. Has she been seeing? Can place new referral if needed.

## 2024-04-01 NOTE — TELEPHONE ENCOUNTER
Pt stated when it was prescribed by Hallie she didn't take the medication so she just started taking it again-didn't go to behavioral heal would like new referral

## 2024-04-01 NOTE — TELEPHONE ENCOUNTER
How long has patient been taking? Is she doing well with it? Can send in enough refills until follows with behavioral health

## 2024-04-25 ENCOUNTER — TELEPHONE (OUTPATIENT)
Dept: OBSTETRICS AND GYNECOLOGY | Facility: CLINIC | Age: 29
End: 2024-04-25
Payer: COMMERCIAL

## 2024-04-25 NOTE — TELEPHONE ENCOUNTER
CBF patient    Patient calling and requesting for refill of her OCP to be sent in.  Patient with recent MAB and Suction D&C 1/4/2024.  Repeat HCG was not completed to verify WNL.  Patient reports that the start of her LMP was 3/31.  Was previously taking norgestimate-ethinyl estradiol (Sprintec 28) 0.25-35 MG-MCG, and would like to have sent in to [facility] [place].  Reviewed with patient that I would review with [facility], and call back.  She verified and voiced understanding.

## 2024-04-26 RX ORDER — NORGESTIMATE AND ETHINYL ESTRADIOL 0.25-0.035
1 KIT ORAL DAILY
Qty: 84 TABLET | Refills: 1 | Status: SHIPPED | OUTPATIENT
Start: 2024-04-26 | End: 2025-04-26

## 2024-04-26 NOTE — TELEPHONE ENCOUNTER
Called and notified patient. Medication sent to [facility] per patient request.  Will start following start of menses, due to start on Monday.  Denies any unprotected intercourse in the last two weeks.

## 2024-05-07 ENCOUNTER — OFFICE VISIT (OUTPATIENT)
Dept: BEHAVIORAL HEALTH | Facility: CLINIC | Age: 29
End: 2024-05-07
Payer: COMMERCIAL

## 2024-05-07 VITALS
SYSTOLIC BLOOD PRESSURE: 112 MMHG | HEIGHT: 64 IN | HEART RATE: 66 BPM | DIASTOLIC BLOOD PRESSURE: 78 MMHG | BODY MASS INDEX: 23.56 KG/M2 | WEIGHT: 138 LBS

## 2024-05-07 DIAGNOSIS — F43.9 TRAUMA AND STRESSOR-RELATED DISORDER: Primary | ICD-10-CM

## 2024-05-07 RX ORDER — LAMOTRIGINE 25 MG/1
TABLET ORAL
Qty: 70 TABLET | Refills: 0 | Status: SHIPPED | OUTPATIENT
Start: 2024-05-07 | End: 2024-06-18

## 2024-05-29 RX ORDER — FLUOXETINE HYDROCHLORIDE 20 MG/1
20 CAPSULE ORAL DAILY
Qty: 30 CAPSULE | Refills: 2 | OUTPATIENT
Start: 2024-05-29

## 2024-05-29 NOTE — TELEPHONE ENCOUNTER
Caller: Glen Bey    Relationship: Self    Best call back number: [phone]    Requested Prescriptions:   Requested Prescriptions     Pending Prescriptions Disp Refills    FLUoxetine (PROzac) 20 MG capsule 30 capsule 2     Sig: Take 1 capsule by mouth Daily.        Pharmacy where request should be sent:   [facility]/pharmacy #[id number] - [address] AT [facility] [address] - [phone]  - [phone] FX      Last office visit with prescribing clinician: 9/26/2022   Last telemedicine visit with prescribing clinician: Visit date not found   Next office visit with prescribing clinician: Visit date not found     Additional details provided by patient:     Does the patient have less than a 3 day supply:  [x] Yes  [] No    Would you like a call back once the refill request has been completed: [x] Yes [] No    If the office needs to give you a call back, can they leave a voicemail: [x] Yes [] No    Marie Malik Rep   05/29/24 12:41 EDT

## 2024-07-09 ENCOUNTER — TELEPHONE (OUTPATIENT)
Dept: BEHAVIORAL HEALTH | Facility: CLINIC | Age: 29
End: 2024-07-09
Payer: COMMERCIAL

## 2024-07-23 ENCOUNTER — LAB (OUTPATIENT)
Dept: OBSTETRICS AND GYNECOLOGY | Facility: CLINIC | Age: 29
End: 2024-07-23
Payer: COMMERCIAL

## 2024-07-23 DIAGNOSIS — O20.0 THREATENED ABORTION: Primary | ICD-10-CM

## 2024-07-24 ENCOUNTER — TELEPHONE (OUTPATIENT)
Dept: OBSTETRICS AND GYNECOLOGY | Facility: CLINIC | Age: 29
End: 2024-07-24

## 2024-07-24 LAB — HCG INTACT+B SERPL-ACNC: 20 MIU/ML

## 2024-07-24 NOTE — PROGRESS NOTES
Positive hcg.  Have her repeat tomorrow along with a progesterone.  Not sure if she is a SUSI or Demetria pt

## 2024-07-25 ENCOUNTER — LAB (OUTPATIENT)
Dept: OBSTETRICS AND GYNECOLOGY | Facility: CLINIC | Age: 29
End: 2024-07-25
Payer: COMMERCIAL

## 2024-07-25 DIAGNOSIS — O20.0 THREATENED ABORTION: Primary | ICD-10-CM

## 2024-07-26 LAB
HCG INTACT+B SERPL-ACNC: 60.2 MIU/ML
PROGEST SERPL-MCNC: 18.7 NG/ML

## 2024-08-12 ENCOUNTER — INITIAL PRENATAL (OUTPATIENT)
Dept: OBSTETRICS AND GYNECOLOGY | Facility: CLINIC | Age: 29
End: 2024-08-12
Payer: COMMERCIAL

## 2024-08-12 VITALS — BODY MASS INDEX: 25.23 KG/M2 | SYSTOLIC BLOOD PRESSURE: 98 MMHG | WEIGHT: 147 LBS | DIASTOLIC BLOOD PRESSURE: 70 MMHG

## 2024-08-12 DIAGNOSIS — O20.0 THREATENED ABORTION: ICD-10-CM

## 2024-08-12 DIAGNOSIS — O21.9 NAUSEA AND VOMITING IN PREGNANCY: ICD-10-CM

## 2024-08-12 DIAGNOSIS — Z34.91 INITIAL OBSTETRIC VISIT IN FIRST TRIMESTER: Primary | ICD-10-CM

## 2024-08-12 PROCEDURE — 99214 OFFICE O/P EST MOD 30 MIN: CPT | Performed by: OBSTETRICS & GYNECOLOGY

## 2024-08-12 RX ORDER — PROMETHAZINE HYDROCHLORIDE 12.5 MG/1
12.5 TABLET ORAL EVERY 6 HOURS PRN
Qty: 30 TABLET | Refills: 3 | Status: SHIPPED | OUTPATIENT
Start: 2024-08-12

## 2024-08-12 RX ORDER — PRENATAL VIT/IRON FUM/FOLIC AC 27MG-0.8MG
TABLET ORAL DAILY
COMMUNITY

## 2024-08-12 NOTE — PROGRESS NOTES
Initial ob visit     CC- Here for care of pregnancy        Glen Bey is a 29 y.o. female, , who presents for her first obstetrical visit.  Patient's last menstrual period was 2023.. Her ZAK is 2025, by Ultrasound. Current GA is 6w1d.     Initial positive test date : 2024, HCG        Her periods are every not sure .  Prior obstetric issues: none  Patient's past medical history is significant for:  denies .  Family history of genetic issues (includes FOB): on mother side  Prior infections concerning in pregnancy (Rash, fever in last 2 weeks): No  Varicella Hx - history of chicken pox  Prior testing for Cystic Fibrosis Carrier or Sickle Cell Trait- denies  Prepregnancy BMI - Body mass index is 25.23 kg/m².  History of STD: yes GC/CHLAMYDIA  Hx of HSV for patient or partner: no  Ultrasound Today: yes    OB History    Para Term  AB Living   6 2 2 0 0 2   SAB IAB Ectopic Molar Multiple Live Births   0 0 0 0 0 2      # Outcome Date GA Lbr Kyle/2nd Weight Sex Type Anes PTL Lv   6 Current            5 Term 20 38w4d / 00:43 3630 g (8 lb) M Vag-Spont EPI N BOBBY   4 Term 17 38w3d / 00:22 3280 g (7 lb 3.7 oz) F Vag-Spont EPI N BOBBY   3             2             1                Obstetric Comments   FOB #1 : Pregnancy #1-2   2017-[facility]   2020 - [place]       Additional Pertinent History   Last Pap : 2022 Result: negative HPV: negative     Last Completed Pap Smear            PAP SMEAR (Every 3 Years) Next due on 2022  LIQUID-BASED PAP SMEAR, [facility] ([facility])    2017  Pap IG, Rfx HPV ASCU                  History of abnormal Pap smear: no  Family history of uterine, colon, breast, or ovarian cancer: no  Feelings of Anxiety or Depression: yes - prozac  Tobacco Usage?: No   Alcohol/Drug Use?: NO  Over the age of 35 at delivery: no  Genetic Screening: desires no genetic testing  Flu Status:  Declines    [facility]    Current Outpatient Medications:     Prenatal Vit-Fe Fumarate-FA (prenatal vitamin 27-0.8) 27-0.8 MG tablet tablet, Take  by mouth Daily., Disp: , Rfl:     FLUoxetine (PROzac) 20 MG capsule, TAKE 1 CAPSULE BY MOUTH EVERY DAY, Disp: 30 capsule, Rfl: 2    lamoTRIgine (LaMICtal) 25 MG tablet, Take 1 tablet by mouth Every Night for 14 days, THEN 2 tablets Every Night for 28 days., Disp: 70 tablet, Rfl: 0    norgestimate-ethinyl estradiol (ORTHO-CYCLEN) 0.25-35 MG-MCG per tablet, Take 1 tablet by mouth Daily., Disp: 84 tablet, Rfl: 1    promethazine (PHENERGAN) 12.5 MG tablet, Take 1 tablet by mouth Every 6 (Six) Hours As Needed for Nausea., Disp: 30 tablet, Rfl: 3     Past Medical History:   Diagnosis Date    Anxiety     stopped med in 2019    Chlamydia     treated for chlamydia in beginning of this pregnancy-2017    Depression         Past Surgical History:   Procedure Laterality Date    APPENDECTOMY  2005       Review of Systems   Review of Systems    Patient Reports: excessive nausea   Patient Denies:excessive vomiting and vaginal bleeding  All systems reviewed and otherwise normal.    I have reviewed and agree with the HPI, ROS, and historical information as entered above. Tristian Liu MD      BP 98/70   Wt 66.7 kg (147 lb)   LMP 06/04/2023   BMI 25.23 kg/m²     The additional following portions of the patient's history were reviewed and updated as appropriate: allergies, current medications, past family history, past medical history, past social history, past surgical history, and problem list.    Physical Exam  General:  well developed; well nourished  no acute distress   Chest/Respiratory: No labored breathing, normal respiratory effort, normal appearance, no respiratory noises noted   Heart:  normal rate, regular rhythm,  no murmurs, rubs, or gallops   Thyroid: normal to inspection and palpation   Breasts:  Not performed.   Abdomen: soft, non-tender; no masses  no umbilical or  inguinal hernias are present  no hepato-splenomegaly   Pelvis: Not performed.        Assessment and Plan    Problem List Items Addressed This Visit       Nausea and vomiting in pregnancy    Threatened      Other Visit Diagnoses       Initial obstetric visit in first trimester    -  Primary    Relevant Orders    Obstetric Panel    HIV-1 / O / 2 Ag / Antibody    Urine Culture - Urine, Urine, Clean Catch    Chlamydia trachomatis, Neisseria gonorrhoeae, PCR - Urine, Urine, Clean Catch    Urine Drug Screen - Urine, Clean Catch    US Ob Transvaginal            Pregnancy at 6w1d  Reviewed routine prenatal care with the office and educational materials given  Lab(s) Ordered  Discussed options for genetic testing including first trimester nuchal translucency screen, genetic disease carrier testing, quadruple screen, and NIPT  Medication(s) Ordered  Nausea/Vomiting - desires medication.  Options discussed and encouraged to be proactive to avoid constipation if on Zofran.  Patient is on Prenatal vitamins  Return in about 2 weeks (around 2024) for Routine prenatal care and repeat US.      Tristian Liu MD  2024

## 2024-08-13 LAB
ABO GROUP BLD: ABNORMAL
BASOPHILS # BLD AUTO: 0.1 X10E3/UL (ref 0–0.2)
BASOPHILS NFR BLD AUTO: 1 %
BLD GP AB SCN SERPL QL: NEGATIVE
EOSINOPHIL # BLD AUTO: 0.2 X10E3/UL (ref 0–0.4)
EOSINOPHIL NFR BLD AUTO: 3 %
ERYTHROCYTE [DISTWIDTH] IN BLOOD BY AUTOMATED COUNT: 11.8 % (ref 11.7–15.4)
HBV SURFACE AG SERPL QL IA: NEGATIVE
HCT VFR BLD AUTO: 40.6 % (ref 34–46.6)
HCV IGG SERPL QL IA: NON REACTIVE
HGB BLD-MCNC: 13.1 G/DL (ref 11.1–15.9)
HIV 1+2 AB+HIV1 P24 AG SERPL QL IA: NON REACTIVE
IMM GRANULOCYTES # BLD AUTO: 0 X10E3/UL (ref 0–0.1)
IMM GRANULOCYTES NFR BLD AUTO: 0 %
LYMPHOCYTES # BLD AUTO: 1.6 X10E3/UL (ref 0.7–3.1)
LYMPHOCYTES NFR BLD AUTO: 22 %
MCH RBC QN AUTO: 31.6 PG (ref 26.6–33)
MCHC RBC AUTO-ENTMCNC: 32.3 G/DL (ref 31.5–35.7)
MCV RBC AUTO: 98 FL (ref 79–97)
MONOCYTES # BLD AUTO: 0.3 X10E3/UL (ref 0.1–0.9)
MONOCYTES NFR BLD AUTO: 5 %
NEUTROPHILS # BLD AUTO: 5 X10E3/UL (ref 1.4–7)
NEUTROPHILS NFR BLD AUTO: 69 %
PLATELET # BLD AUTO: 343 X10E3/UL (ref 150–450)
RBC # BLD AUTO: 4.14 X10E6/UL (ref 3.77–5.28)
RH BLD: POSITIVE
RPR SER QL: NON REACTIVE
RUBV IGG SERPL IA-ACNC: 2.11 INDEX
WBC # BLD AUTO: 7.2 X10E3/UL (ref 3.4–10.8)

## 2024-08-14 LAB
AMPHETAMINES UR QL SCN: NEGATIVE NG/ML
BACTERIA UR CULT: NO GROWTH
BACTERIA UR CULT: NORMAL
BARBITURATES UR QL SCN: NEGATIVE NG/ML
BENZODIAZ UR QL SCN: NEGATIVE NG/ML
BZE UR QL SCN: NEGATIVE NG/ML
C TRACH RRNA SPEC QL NAA+PROBE: NEGATIVE
CANNABINOIDS UR QL SCN: POSITIVE NG/ML
CREAT UR-MCNC: 114.4 MG/DL (ref 20–300)
LABORATORY COMMENT REPORT: ABNORMAL
METHADONE UR QL SCN: NEGATIVE NG/ML
N GONORRHOEA RRNA SPEC QL NAA+PROBE: NEGATIVE
OPIATES UR QL SCN: NEGATIVE NG/ML
OXYCODONE+OXYMORPHONE UR QL SCN: NEGATIVE NG/ML
PCP UR QL: NEGATIVE NG/ML
PH UR: 6 [PH] (ref 4.5–8.9)
PROPOXYPH UR QL SCN: NEGATIVE NG/ML

## 2024-08-26 ENCOUNTER — ROUTINE PRENATAL (OUTPATIENT)
Dept: OBSTETRICS AND GYNECOLOGY | Facility: CLINIC | Age: 29
End: 2024-08-26
Payer: COMMERCIAL

## 2024-08-26 VITALS — BODY MASS INDEX: 25.4 KG/M2 | DIASTOLIC BLOOD PRESSURE: 60 MMHG | WEIGHT: 148 LBS | SYSTOLIC BLOOD PRESSURE: 100 MMHG

## 2024-08-26 DIAGNOSIS — Z3A.08 8 WEEKS GESTATION OF PREGNANCY: Primary | ICD-10-CM

## 2024-08-26 DIAGNOSIS — O21.9 NAUSEA AND VOMITING IN PREGNANCY: ICD-10-CM

## 2024-08-26 LAB
GLUCOSE UR STRIP-MCNC: NEGATIVE MG/DL
PROT UR STRIP-MCNC: NEGATIVE MG/DL

## 2024-08-26 PROCEDURE — 99213 OFFICE O/P EST LOW 20 MIN: CPT | Performed by: OBSTETRICS & GYNECOLOGY

## 2024-08-26 RX ORDER — ONDANSETRON 4 MG/1
4 TABLET, ORALLY DISINTEGRATING ORAL EVERY 8 HOURS PRN
Qty: 30 TABLET | Refills: 3 | Status: SHIPPED | OUTPATIENT
Start: 2024-08-26

## 2024-08-26 NOTE — PROGRESS NOTES
OB FOLLOW UP  CC- Here for care of pregnancy        Glen Bey is a 29 y.o.  8w1d patient being seen today for her obstetrical follow up visit. Patient reports nausea and vomiting despite phenergan.     US reviewed today and confirmed cardiac activity and dating.      Her prenatal care is complicated by (and status) :   Patient Active Problem List   Diagnosis    Postpartum care following vaginal delivery    Depression with anxiety    Positive urine pregnancy test    Attention deficit hyperactivity disorder (ADHD), predominantly inattentive type    Nausea and vomiting in pregnancy    Threatened     Nausea and vomiting during pregnancy         NOB labs reviewed  Flu Status: Declines  Ultrasound Today: Yes    ROS -   Patient Denies: leaking of fluid, vaginal bleeding, dysuria, excessive vomiting, and more than 6 contractions per hour  All other systems reviewed and are negative.     The additional following portions of the patient's history were reviewed and updated as appropriate: allergies and current medications.    I have reviewed and agree with the HPI, ROS, and historical information as entered above. Tristian Liu MD          /60   Wt 67.1 kg (148 lb)   LMP 2023   BMI 25.40 kg/m²         EXAM:     Prenatal Vitals  BP: 100/60  Weight: 67.1 kg (148 lb)              Urine Glucose Read-only: Negative  Urine Protein Read-only: Negative       Assessment and Plan    Problem List Items Addressed This Visit       Nausea and vomiting in pregnancy     Other Visit Diagnoses       8 weeks gestation of pregnancy    -  Primary    Relevant Orders    POC Urinalysis Dipstick (Completed)            Pregnancy at 8w1d  Labs reviewed from New OB Visit.  Counseled on genetic testing, carrier status and option for NT screen  Activity and Exercise discussed.  Medication(s) Ordered  Nausea/Vomiting - desires medication.  Options discussed of Diclegis/Bonjesta, Promethazine, and  Zofran  Patient is on Prenatal vitamins  Return in about 4 weeks (around 9/23/2024) for Routine prenatal care.    Tristian Liu MD  08/26/2024

## 2024-09-12 NOTE — TELEPHONE ENCOUNTER
Please call patient. I have not seen her since 2022. It appears she is currently pregnant. Encourage she reach out to her OBGYN for med management to make sure this is safe for her to continue during her pregnancy (it is not SSRI of choice with pregnancy)

## 2024-09-23 ENCOUNTER — ROUTINE PRENATAL (OUTPATIENT)
Dept: OBSTETRICS AND GYNECOLOGY | Facility: CLINIC | Age: 29
End: 2024-09-23
Payer: COMMERCIAL

## 2024-09-23 VITALS — WEIGHT: 147 LBS | SYSTOLIC BLOOD PRESSURE: 90 MMHG | DIASTOLIC BLOOD PRESSURE: 60 MMHG | BODY MASS INDEX: 25.23 KG/M2

## 2024-09-23 DIAGNOSIS — F12.10 MILD TETRAHYDROCANNABINOL (THC) ABUSE: ICD-10-CM

## 2024-09-23 DIAGNOSIS — Z3A.12 12 WEEKS GESTATION OF PREGNANCY: Primary | ICD-10-CM

## 2024-09-23 DIAGNOSIS — F41.8 DEPRESSION WITH ANXIETY: ICD-10-CM

## 2024-09-23 DIAGNOSIS — Z34.02 ENCOUNTER FOR SUPERVISION OF NORMAL FIRST PREGNANCY IN SECOND TRIMESTER: ICD-10-CM

## 2024-09-23 LAB
GLUCOSE UR STRIP-MCNC: NEGATIVE MG/DL
PROT UR STRIP-MCNC: NEGATIVE MG/DL

## 2024-09-23 PROCEDURE — 99214 OFFICE O/P EST MOD 30 MIN: CPT | Performed by: OBSTETRICS & GYNECOLOGY

## 2024-09-24 LAB
AMPHETAMINES UR QL SCN: NEGATIVE NG/ML
BARBITURATES UR QL SCN: NEGATIVE NG/ML
BENZODIAZ UR QL SCN: NEGATIVE NG/ML
BZE UR QL SCN: NEGATIVE NG/ML
CANNABINOIDS UR QL SCN: NEGATIVE NG/ML
CREAT UR-MCNC: 92 MG/DL (ref 20–300)
LABORATORY COMMENT REPORT: NORMAL
METHADONE UR QL SCN: NEGATIVE NG/ML
OPIATES UR QL SCN: NEGATIVE NG/ML
OXYCODONE+OXYMORPHONE UR QL SCN: NEGATIVE NG/ML
PCP UR QL: NEGATIVE NG/ML
PH UR: 7.2 [PH] (ref 4.5–8.9)
PROPOXYPH UR QL SCN: NEGATIVE NG/ML

## 2024-10-18 RX ORDER — NORGESTIMATE AND ETHINYL ESTRADIOL 0.25-0.035
1 KIT ORAL DAILY
Qty: 84 TABLET | Refills: 1 | Status: SHIPPED | OUTPATIENT
Start: 2024-10-18

## 2024-10-21 ENCOUNTER — ROUTINE PRENATAL (OUTPATIENT)
Dept: OBSTETRICS AND GYNECOLOGY | Facility: CLINIC | Age: 29
End: 2024-10-21
Payer: COMMERCIAL

## 2024-10-21 VITALS — DIASTOLIC BLOOD PRESSURE: 72 MMHG | WEIGHT: 152 LBS | BODY MASS INDEX: 26.09 KG/M2 | SYSTOLIC BLOOD PRESSURE: 98 MMHG

## 2024-10-21 DIAGNOSIS — Z3A.16 16 WEEKS GESTATION OF PREGNANCY: Primary | ICD-10-CM

## 2024-10-21 LAB
GLUCOSE UR STRIP-MCNC: NEGATIVE MG/DL
PROT UR STRIP-MCNC: ABNORMAL MG/DL

## 2024-10-21 PROCEDURE — 99213 OFFICE O/P EST LOW 20 MIN: CPT | Performed by: OBSTETRICS & GYNECOLOGY

## 2024-10-21 NOTE — PROGRESS NOTES
OB FOLLOW UP  CC- Here for care of pregnancy        Glen Bey is a 29 y.o.  16w1d patient being seen today for her obstetrical follow up visit. Patient reports no complaints    Her prenatal care is complicated by (and status) : see below.  Patient Active Problem List   Diagnosis    Postpartum care following vaginal delivery    Depression with anxiety    Positive urine pregnancy test    Attention deficit hyperactivity disorder (ADHD), predominantly inattentive type    Nausea and vomiting in pregnancy    Threatened     Nausea and vomiting during pregnancy       Flu Status: Declines  Ultrasound Today: No      ROS -   Patient Denies: leaking of fluid, vaginal bleeding, dysuria, excessive vomiting, and more than 6 contractions per hour  All other systems reviewed and are negative.       The additional following portions of the patient's history were reviewed and updated as appropriate: allergies and current medications.      I have reviewed and agree with the HPI, ROS, and historical information as entered above. Tristian Liu MD          EXAM:     Prenatal Vitals  BP: 98/72  Weight: 68.9 kg (152 lb)             Urine Glucose Read-only: Negative  Urine Protein Read-only: (!) Trace           Assessment and Plan    Problem List Items Addressed This Visit    None  Visit Diagnoses       16 weeks gestation of pregnancy    -  Primary    Relevant Orders    POC Urinalysis Dipstick (Completed)    US Ob 14 + Weeks Single or First Gestation            Pregnancy at 16w1d  Fetal status reassuring.   Counseled on MSAFP alone in relation to OTD and placental issues.    Anatomy scan next visit.   Activity and Exercise discussed.  U/S ordered at follow up  Patient is on Prenatal vitamins  Return in about 4 weeks (around 2024) for Routine prenatal care and US.    Tristian Liu MD  10/21/2024

## 2024-11-11 ENCOUNTER — ROUTINE PRENATAL (OUTPATIENT)
Dept: OBSTETRICS AND GYNECOLOGY | Facility: CLINIC | Age: 29
End: 2024-11-11
Payer: COMMERCIAL

## 2024-11-11 VITALS — DIASTOLIC BLOOD PRESSURE: 70 MMHG | BODY MASS INDEX: 26.61 KG/M2 | WEIGHT: 155 LBS | SYSTOLIC BLOOD PRESSURE: 110 MMHG

## 2024-11-11 DIAGNOSIS — Z3A.19 19 WEEKS GESTATION OF PREGNANCY: Primary | ICD-10-CM

## 2024-11-11 NOTE — PROGRESS NOTES
OB FOLLOW UP  CC- Here for care of pregnancy        Glen Bey is a 29 y.o.  19w1d patient being seen today for her obstetrical follow up visit. Patient reports no complaints.     Her prenatal care is complicated by (and status) : see below.  Patient Active Problem List   Diagnosis    Postpartum care following vaginal delivery    Depression with anxiety    Positive urine pregnancy test    Attention deficit hyperactivity disorder (ADHD), predominantly inattentive type    Nausea and vomiting in pregnancy    Threatened     Nausea and vomiting during pregnancy       Flu Status: Declines  Ultrasound Today: Yes    ROS -     Patient Denies: leaking of fluid, vaginal bleeding, dysuria, excessive vomiting, and more than 6 contractions per hour  Fetal Movement : Yes  All other systems reviewed and are negative.       The additional following portions of the patient's history were reviewed and updated as appropriate: allergies and current medications.      I have reviewed and agree with the HPI, ROS, and historical information as entered above. Tristian Liu MD      /70   Wt 70.3 kg (155 lb)   LMP 2023   BMI 26.61 kg/m²       EXAM:     Prenatal Vitals  BP: 110/70  Weight: 70.3 kg (155 lb)                      Assessment and Plan    Problem List Items Addressed This Visit    None  Visit Diagnoses       19 weeks gestation of pregnancy    -  Primary    Relevant Orders    POC Urinalysis Dipstick            Pregnancy at 19w1d  Anatomy scan today is complete and appear within normal limits.  Fetal status reassuring.   Activity and Exercise discussed.  Patient is on Prenatal vitamins  Return in about 4 weeks (around 2024) for Routine prenatal care.      Tristian Liu MD  2024

## 2024-11-12 ENCOUNTER — REFERRAL TRIAGE (OUTPATIENT)
Dept: LABOR AND DELIVERY | Facility: HOSPITAL | Age: 29
End: 2024-11-12
Payer: COMMERCIAL

## 2024-11-15 ENCOUNTER — PATIENT OUTREACH (OUTPATIENT)
Dept: LABOR AND DELIVERY | Facility: HOSPITAL | Age: 29
End: 2024-11-15
Payer: COMMERCIAL

## 2024-11-15 NOTE — OUTREACH NOTE
[facility]  Enrollment    Current Estimated Gestational Age: 19w5d    Questions/Answers      Flowsheet Row Responses   Would like to participate? No          Glen returned my call and discussed the [facility]. She has declined to enroll. Basic resources sent via [employer]. Contact info provided, encouraged to call if she has any questions, concerns, or needs assistance with resources.  JOSE ALBERTO Garner RN  Maternity Nurse Navigator    11/15/2024, 10:38 EST

## 2024-12-09 ENCOUNTER — ROUTINE PRENATAL (OUTPATIENT)
Dept: OBSTETRICS AND GYNECOLOGY | Facility: CLINIC | Age: 29
End: 2024-12-09
Payer: COMMERCIAL

## 2024-12-09 VITALS — WEIGHT: 166 LBS | DIASTOLIC BLOOD PRESSURE: 70 MMHG | SYSTOLIC BLOOD PRESSURE: 102 MMHG | BODY MASS INDEX: 28.49 KG/M2

## 2024-12-09 DIAGNOSIS — Z3A.23 23 WEEKS GESTATION OF PREGNANCY: Primary | ICD-10-CM

## 2024-12-09 PROBLEM — O20.0 THREATENED ABORTION: Status: RESOLVED | Noted: 2024-08-12 | Resolved: 2024-12-09

## 2024-12-09 LAB
GLUCOSE UR STRIP-MCNC: NEGATIVE MG/DL
PROT UR STRIP-MCNC: NEGATIVE MG/DL

## 2024-12-09 PROCEDURE — 99213 OFFICE O/P EST LOW 20 MIN: CPT | Performed by: OBSTETRICS & GYNECOLOGY

## 2024-12-09 NOTE — PROGRESS NOTES
OB FOLLOW UP  CC- Here for care of pregnancy        Glen Bey is a 29 y.o.  23w1d patient being seen today for her obstetrical follow up visit. Patient reports no complaints.     Her prenatal care is complicated by (and status) : see below.  Patient Active Problem List   Diagnosis    Depression with anxiety    Positive urine pregnancy test    Attention deficit hyperactivity disorder (ADHD), predominantly inattentive type    Nausea and vomiting in pregnancy    Nausea and vomiting during pregnancy       Flu Status: Declines  Ultrasound Today: No  Reviewed 1 hr glucose testing and TDAP next visit.    ROS -   Patient Denies: leaking of fluid, vaginal bleeding, dysuria, excessive vomiting, and more than 6 contractions per hour  Fetal Movement : normal  All other systems reviewed and are negative.       The additional following portions of the patient's history were reviewed and updated as appropriate: allergies, current medications, past family history, past medical history, past social history, past surgical history, and problem list.      I have reviewed and agree with the HPI, ROS, and historical information as entered above. Tristian Liu MD      /70   Wt 75.3 kg (166 lb)   LMP 2023   BMI 28.49 kg/m²       EXAM:     Prenatal Vitals  BP: 102/70  Weight: 75.3 kg (166 lb)                   Urine Glucose Read-only: Negative  Urine Protein Read-only: Negative       Assessment and Plan    Problem List Items Addressed This Visit    None  Visit Diagnoses       23 weeks gestation of pregnancy    -  Primary    Relevant Orders    POC Urinalysis Dipstick (Completed)            Pregnancy at 23w1d  Fetal status reassuring.  No US indicated today.  1 hour gtt, CBC, Antibody screen, TDAP, and RPR next visit. Instructions given  Fetal movement/PTL or Labor precautions  Reviewed routine prenatal care with the office and educational materials given  Discussed/encouraged TDAP vaccination after  28 weeks  Reviewed Pre-eclampsia signs/symptoms  Activity and Exercise discussed.  Return in about 4 weeks (around 1/6/2025) for Routine prenatal care.      Tristian Liu MD  12/09/2024

## 2024-12-09 NOTE — PROGRESS NOTES
"    OB FOLLOW UP  CC- Here for care of pregnancy        Glen Bey is a 29 y.o.  23w1d patient being seen today for her obstetrical follow up visit. Patient reports {lexob pregnancy problems before 24 weeks:02624}.     Her prenatal care is complicated by (and status) : see below.  Patient Active Problem List   Diagnosis    Postpartum care following vaginal delivery    Depression with anxiety    Positive urine pregnancy test    Attention deficit hyperactivity disorder (ADHD), predominantly inattentive type    Nausea and vomiting in pregnancy    Threatened     Nausea and vomiting during pregnancy       Flu Status: Declines  Ultrasound Today: {Responses; yes/no (default no):37696}  Reviewed 1 hr glucose testing and TDAP next visit.    ROS -   Patient Denies: {lexob denies before 24 weeks:60492}  Fetal Movement : {desc; normal/decreased:44589}  All other systems reviewed and are negative.       The additional following portions of the patient's history were reviewed and updated as appropriate: allergies and current medications.      I have reviewed and agree with the HPI, ROS, and historical information as entered above. ***    LMP 2023       EXAM:                                 Assessment and Plan    Problem List Items Addressed This Visit    None      Pregnancy at 23w1d  Fetal status reassuring.  {follow up anatomy:83784}  {28wk labs [facility] (Optional):83019::\"1 hour gtt\",\"CBC\",\"Antibody screen\",\"TDAP\",\"RPR\"} next visit. Instructions given  {pregnancy plan 24w lexob:17151::\"Discussed/encouraged TDAP vaccination after 28 weeks\"}  Activity and Exercise discussed.  No follow-ups on file.      Grecia Small MA  2024   "

## 2025-01-20 ENCOUNTER — ROUTINE PRENATAL (OUTPATIENT)
Dept: OBSTETRICS AND GYNECOLOGY | Facility: CLINIC | Age: 30
End: 2025-01-20
Payer: COMMERCIAL

## 2025-01-20 VITALS — WEIGHT: 170 LBS | BODY MASS INDEX: 29.18 KG/M2 | SYSTOLIC BLOOD PRESSURE: 112 MMHG | DIASTOLIC BLOOD PRESSURE: 74 MMHG

## 2025-01-20 DIAGNOSIS — Z3A.29 29 WEEKS GESTATION OF PREGNANCY: Primary | ICD-10-CM

## 2025-01-20 NOTE — PROGRESS NOTES
OB FOLLOW UP  CC- Here for care of pregnancy        Glen Bey is a 29 y.o.  29w1d patient being seen today for her obstetrical follow up. Patient reports no complaints.     Patient undergoing Glucola testing today. She is due for her testing at 10:36 AM.       MBT: AB+  Rhogam: is not indicated.  28 week packet: reviewed with patient , counseled on fetal movement , pediatrician list reviewed, breast pump discussed, and childbirth classes reviewed  TDAP: declines  Ultrasound Today: No    Her prenatal care is complicated by (and status) : see below.  Patient Active Problem List   Diagnosis    Depression with anxiety    Positive urine pregnancy test    Attention deficit hyperactivity disorder (ADHD), predominantly inattentive type    Nausea and vomiting in pregnancy    Nausea and vomiting during pregnancy         ROS -   Patient Denies: Loss of Fluid, Vaginal Spotting, Vision Changes, Headaches, Nausea , Vomiting , Contractions, Epigastric pain, and skin itching  Fetal Movement : normal    The additional following portions of the patient's history were reviewed and updated as appropriate: allergies, current medications, past family history, past medical history, past social history, past surgical history, and problem list.    I have reviewed and agree with the HPI, ROS, and historical information as entered above. Tristian Liu MD      /74   Wt 77.1 kg (170 lb)   LMP 2023   BMI 29.18 kg/m²         EXAM:     Prenatal Vitals  BP: 112/74  Weight: 77.1 kg (170 lb)                             Assessment and Plan    Problem List Items Addressed This Visit    None  Visit Diagnoses       29 weeks gestation of pregnancy    -  Primary    Relevant Orders    CBC (No Diff)    Gestational Screen 1 Hr ([facility])    Antibody Screen    RPR, Rfx Qn RPR / Confirm TP            Pregnancy at 29w1d  1 hr Glucola, CBC, RPR. Antibody screen and TDAP today  Fetal movement/PTL or Labor  precautions  Lab(s) Ordered  Patient is on Prenatal vitamins  Reviewed Pre-eclampsia signs/symptoms  Activity and Exercise discussed.  Return in about 2 weeks (around 2/3/2025) for Routine prenatal care.        Tristian Liu MD  01/20/2025

## 2025-01-21 LAB
BLD GP AB SCN SERPL QL: NEGATIVE
ERYTHROCYTE [DISTWIDTH] IN BLOOD BY AUTOMATED COUNT: 11.8 % (ref 12.3–15.4)
GLUCOSE 1H P 50 G GLC PO SERPL-MCNC: 91 MG/DL (ref 65–139)
HCT VFR BLD AUTO: 32.3 % (ref 34–46.6)
HGB BLD-MCNC: 11.1 G/DL (ref 12–15.9)
MCH RBC QN AUTO: 31.4 PG (ref 26.6–33)
MCHC RBC AUTO-ENTMCNC: 34.4 G/DL (ref 31.5–35.7)
MCV RBC AUTO: 91.5 FL (ref 79–97)
PLATELET # BLD AUTO: 378 10*3/MM3 (ref 140–450)
RBC # BLD AUTO: 3.53 10*6/MM3 (ref 3.77–5.28)
RPR SER QL: NON REACTIVE
WBC # BLD AUTO: 10.46 10*3/MM3 (ref 3.4–10.8)

## 2025-01-21 RX ORDER — FERROUS SULFATE 325(65) MG
325 TABLET ORAL
Qty: 30 TABLET | Refills: 1 | Status: SHIPPED | OUTPATIENT
Start: 2025-01-21

## 2025-01-31 ENCOUNTER — HOSPITAL ENCOUNTER (OUTPATIENT)
Facility: HOSPITAL | Age: 30
Setting detail: OBSERVATION
Discharge: HOME | End: 2025-02-01
Attending: OBSTETRICS & GYNECOLOGY | Admitting: OBSTETRICS & GYNECOLOGY
Payer: COMMERCIAL

## 2025-01-31 PROBLEM — R10.2 PELVIC PAIN DURING PREGNANCY IN SECOND TRIMESTER, ANTEPARTUM: Status: ACTIVE | Noted: 2025-01-31

## 2025-01-31 PROBLEM — O26.892 PELVIC PAIN DURING PREGNANCY IN SECOND TRIMESTER, ANTEPARTUM: Status: ACTIVE | Noted: 2025-01-31

## 2025-01-31 LAB
BACTERIA UR QL AUTO: ABNORMAL /HPF
BACTERIA UR QL AUTO: ABNORMAL /HPF
BILIRUB UR QL STRIP: NEGATIVE
BILIRUB UR QL STRIP: NEGATIVE
CLARITY UR: ABNORMAL
CLARITY UR: ABNORMAL
COLOR UR: YELLOW
COLOR UR: YELLOW
GLUCOSE UR STRIP-MCNC: NEGATIVE MG/DL
GLUCOSE UR STRIP-MCNC: NEGATIVE MG/DL
HGB UR QL STRIP.AUTO: ABNORMAL
HGB UR QL STRIP.AUTO: ABNORMAL
HYALINE CASTS UR QL AUTO: ABNORMAL /LPF
HYALINE CASTS UR QL AUTO: ABNORMAL /LPF
KETONES UR QL STRIP: NEGATIVE
KETONES UR QL STRIP: NEGATIVE
LEUKOCYTE ESTERASE UR QL STRIP.AUTO: ABNORMAL
LEUKOCYTE ESTERASE UR QL STRIP.AUTO: NEGATIVE
NITRITE UR QL STRIP: NEGATIVE
NITRITE UR QL STRIP: NEGATIVE
PH UR STRIP.AUTO: 6.5 [PH] (ref 5–8)
PH UR STRIP.AUTO: 6.5 [PH] (ref 5–8)
PROT UR QL STRIP: ABNORMAL
PROT UR QL STRIP: NEGATIVE
RBC # UR STRIP: ABNORMAL /HPF
RBC # UR STRIP: ABNORMAL /HPF
REF LAB TEST METHOD: ABNORMAL
REF LAB TEST METHOD: ABNORMAL
SP GR UR STRIP: 1.02 (ref 1–1.03)
SP GR UR STRIP: 1.02 (ref 1–1.03)
SQUAMOUS #/AREA URNS HPF: ABNORMAL /HPF
SQUAMOUS #/AREA URNS HPF: ABNORMAL /HPF
UROBILINOGEN UR QL STRIP: ABNORMAL
UROBILINOGEN UR QL STRIP: ABNORMAL
WBC # UR STRIP: ABNORMAL /HPF
WBC # UR STRIP: ABNORMAL /HPF

## 2025-01-31 PROCEDURE — 81001 URINALYSIS AUTO W/SCOPE: CPT | Performed by: OBSTETRICS & GYNECOLOGY

## 2025-01-31 PROCEDURE — G0378 HOSPITAL OBSERVATION PER HR: HCPCS

## 2025-01-31 PROCEDURE — 87086 URINE CULTURE/COLONY COUNT: CPT | Performed by: OBSTETRICS & GYNECOLOGY

## 2025-01-31 PROCEDURE — 59025 FETAL NON-STRESS TEST: CPT

## 2025-01-31 PROCEDURE — G0463 HOSPITAL OUTPT CLINIC VISIT: HCPCS

## 2025-02-01 VITALS
RESPIRATION RATE: 16 BRPM | SYSTOLIC BLOOD PRESSURE: 108 MMHG | DIASTOLIC BLOOD PRESSURE: 71 MMHG | HEIGHT: 65 IN | TEMPERATURE: 97.9 F | BODY MASS INDEX: 28.32 KG/M2 | WEIGHT: 170 LBS | HEART RATE: 94 BPM

## 2025-02-01 PROCEDURE — 59025 FETAL NON-STRESS TEST: CPT

## 2025-02-01 PROCEDURE — 25010000002 CEFTRIAXONE PER 250 MG: Performed by: OBSTETRICS & GYNECOLOGY

## 2025-02-01 PROCEDURE — G0378 HOSPITAL OBSERVATION PER HR: HCPCS

## 2025-02-01 RX ADMIN — SODIUM CHLORIDE 2000 MG: 900 INJECTION INTRAVENOUS at 00:48

## 2025-02-01 NOTE — H&P
"Glen Mora  1995  [id number]  [id number]    CC: pelvic pressure, spotting  HPI:  Patient is 30 y.o. female   currently at 30w5d presents with c/o pelvic pressure, onset ~1830, spotting seen with wiping only.  Good FM.  BPNC to date .  Pt denies recent intercourse.    PMH:  Current meds: PNV, Fe, prozac 20mg/d  Illnesses: depression  Surgeries: open appy, oral surg  Allergies: NKDA    Past OB History:       OB History    Para Term  AB Living   5 2 2 0 2 2   SAB IAB Ectopic Molar Multiple Live Births   2 0 0 0 0 2      # Outcome Date GA Lbr Kyle/2nd Weight Sex Type Anes PTL Lv   5 Current            4 SAB 2023 8w0d             Birth Comments: D&C   3 SAB 2022              Birth Comments: D&C   2 Term 20 38w4d / 00:43 3630 g (8 lb) M Vag-Spont EPI N BOBBY      Name: WILLIAMS MORA      Apgar1: 8  Apgar5: 9   1 Term 17 38w3d / 00:22 3280 g (7 lb 3.7 oz) F Vag-Spont EPI N BOBBY      Name: BRADLEY MORA      Apgar1: 9  Apgar5: 9      Obstetric Comments   FOB #1 : Pregnancy #1-2   2017-[facility]   2020 - [place]            SH: tob neg , EtOH neg, drugs neg      General ROS: cramping, spotting.   All other systems reviewed and are negative.      Physical Examination: General appearance - alert, well appearing, and in no distress  Vital signs - Ht 165.1 cm (65\")   Wt 77.1 kg (170 lb)   LMP 2023   BMI 28.29 kg/m²   HEENT: normocephalic, atraumatic,oropharynx clear, appearance of ears and nose normal  Neck - supple, no significant adenopathy, no thyromegaly  Lymphatics - no palpable lymphadenopathy in the neck or groin, no hepatosplenomegaly  Chest - clear to auscultation, no wheezes, rales or rhonchi, respiratory effort non-labored  Heart - normal rate, regular rhythm, no murmurs, rubs, clicks or gallops, no JVD, no lower extremity edema  Abdomen - soft, nontender, nondistended, no masses, no hepatosplenomegaly  no rebound tenderness noted, bowel sounds " normal  Vaginal Exam: cl-1/50%/-2, no blood in vault,external genitalia normal  Extremities - no pedal edema noted, no calf tenderness  Skin -warm and dry, normal coloration and turgor, no rashes, no suspicious skin lesions noted    Fetal monitoring: indication cramping/spotting, onset 2228, offset 2308, baseline 140, mod BTB variability, multiple accels (15 X 15), no decels, no contractions, interpretation reactive NST    Radiology     Assessment 1)IUP 30 5/7 weeks   2)cramping- no evid  labor, poss UTI (initial ccUA TNTC WBC and RBC    But specimen contam)    Plan 1)observe   2)repeat ccUA with proper instructions if still contam, cath UA   3)if UTI, will tx and release    Brady Carvalho MD  2025  23:04 EST

## 2025-02-02 LAB
BACTERIA SPEC AEROBE CULT: NO GROWTH
BACTERIA SPEC AEROBE CULT: NO GROWTH

## 2025-02-03 ENCOUNTER — ROUTINE PRENATAL (OUTPATIENT)
Dept: OBSTETRICS AND GYNECOLOGY | Facility: CLINIC | Age: 30
End: 2025-02-03
Payer: COMMERCIAL

## 2025-02-03 VITALS — SYSTOLIC BLOOD PRESSURE: 116 MMHG | BODY MASS INDEX: 28.96 KG/M2 | DIASTOLIC BLOOD PRESSURE: 74 MMHG | WEIGHT: 174 LBS

## 2025-02-03 DIAGNOSIS — O36.5930 POOR FETAL GROWTH AFFECTING MANAGEMENT OF MOTHER IN THIRD TRIMESTER, SINGLE OR UNSPECIFIED FETUS: ICD-10-CM

## 2025-02-03 DIAGNOSIS — Z34.80 SUPERVISION OF OTHER NORMAL PREGNANCY, ANTEPARTUM: Primary | ICD-10-CM

## 2025-02-03 NOTE — PROGRESS NOTES
OB FOLLOW UP  CC- Here for care of pregnancy        Glen Bey is a 30 y.o.  31w1d patient being seen today for her obstetrical follow up visit. Patient reports pelvic pressure and some spotting last night that stopped after 20 minutes.     Her prenatal care is complicated by (and status) :   none  Patient Active Problem List   Diagnosis    Depression with anxiety    Positive urine pregnancy test    Attention deficit hyperactivity disorder (ADHD), predominantly inattentive type    Nausea and vomiting in pregnancy    Nausea and vomiting during pregnancy    Pelvic pain during pregnancy in second trimester, antepartum       Flu Status: Declines  TDAP status: declines  RSV vaccine: declines.  Rhogam status: was not indicated  28 week labs: Reviewed and Labs show anemia. She is taking additional iron supplement.  Ultrasound Today: No  Non Stress Test: No.      ROS -   Patient Denies: Loss of Fluid, Vision Changes, Headaches, Nausea , Vomiting , Contractions, and skin itching  Fetal Movement : normal  Other than what is documented in the HPI, all other systems reviewed and are negative.     The additional following portions of the patient's history were reviewed and updated as appropriate: allergies, current medications, past family history, past medical history, past social history, past surgical history, and problem list.    I have reviewed and agree with the HPI, ROS, and historical information as entered above. Tristian Liu MD      /74   Wt 78.9 kg (174 lb)   LMP 2023   BMI 28.96 kg/m²         EXAM:     Prenatal Vitals  BP: 116/74  Weight: 78.9 kg (174 lb)                               Assessment and Plan    Problem List Items Addressed This Visit    None  Visit Diagnoses       Supervision of other normal pregnancy, antepartum    -  Primary    Poor fetal growth affecting management of mother in third trimester, single or unspecified fetus        Relevant Orders    US Ob  Follow Up Transabdominal Approach            Pregnancy at 31w1d  Fetal status reassuring.  28 week labs reviewed.    Activity and Exercise discussed.  Fetal movement/PTL or Labor precautions  U/S ordered at follow up  Patient is on Prenatal vitamins  Reviewed Pre-eclampsia signs/symptoms  Return in about 2 weeks (around 2/17/2025) for Routine prenatal care and US.    Tristian Liu MD  02/03/2025

## 2025-02-17 ENCOUNTER — TELEPHONE (OUTPATIENT)
Dept: OBSTETRICS AND GYNECOLOGY | Facility: CLINIC | Age: 30
End: 2025-02-17

## 2025-02-17 ENCOUNTER — ROUTINE PRENATAL (OUTPATIENT)
Dept: OBSTETRICS AND GYNECOLOGY | Facility: CLINIC | Age: 30
End: 2025-02-17
Payer: COMMERCIAL

## 2025-02-17 VITALS — WEIGHT: 174.2 LBS | DIASTOLIC BLOOD PRESSURE: 66 MMHG | SYSTOLIC BLOOD PRESSURE: 108 MMHG | BODY MASS INDEX: 28.99 KG/M2

## 2025-02-17 DIAGNOSIS — Z34.93 PRENATAL CARE IN THIRD TRIMESTER: Primary | ICD-10-CM

## 2025-02-17 LAB
EXPIRATION DATE: NORMAL
GLUCOSE UR STRIP-MCNC: NEGATIVE MG/DL
Lab: NORMAL
PROT UR STRIP-MCNC: NEGATIVE MG/DL

## 2025-02-17 NOTE — PROGRESS NOTES
OB FOLLOW UP  CC- Here for care of pregnancy        Glen Bey is a 30 y.o.  33w1d patient being seen today for her obstetrical follow up visit. Patient reports intermittent spotting with wiping, 3 x in the last week.  She also reports increased pelvic pain and pressure.  She also reports a constant, moderate to severe headache, with no relief from tylenol.   She reports +fm.    Her prenatal care is complicated by (and status) :   Patient Active Problem List   Diagnosis    Depression with anxiety    Positive urine pregnancy test    Attention deficit hyperactivity disorder (ADHD), predominantly inattentive type    Nausea and vomiting in pregnancy    Nausea and vomiting during pregnancy    Pelvic pain during pregnancy in second trimester, antepartum       Flu Status: Declines  RSV: declines.  Ultrasound Today: yes  Non Stress Test: No.    ROS -   Patient Denies: Loss of Fluid, Vision Changes, Nausea , Vomiting , Epigastric pain, and skin itching  Fetal Movement : normal  Other than what is documented in the HPI, all other systems reviewed and are negative.       The additional following portions of the patient's history were reviewed and updated as appropriate: allergies, current medications, past family history, past medical history, past social history, past surgical history, and problem list.    I have reviewed and agree with the HPI, ROS, and historical information as entered above. Tristian Liu MD      /66   Wt 79 kg (174 lb 3.2 oz)   LMP 2023   BMI 28.99 kg/m²       EXAM:     Prenatal Vitals  BP: 108/66  Weight: 79 kg (174 lb 3.2 oz)                   Urine Glucose Read-only: Negative  Urine Protein Read-only: Negative           Assessment and Plan    Problem List Items Addressed This Visit    None  Visit Diagnoses       Prenatal care in third trimester    -  Primary    Relevant Orders    POC Glucose, Urine, Qualitative, Dipstick (Completed)    POC Protein, Urine,  Qualitative, Dipstick (Completed)            Pregnancy at 33w1d  Fetal status reassuring.   Activity and Exercise discussed.  Fetal movement/PTL or Labor precautions  Reviewed Pre-eclampsia signs/symptoms  GBS next visit  Return in about 2 weeks (around 3/3/2025) for Routine prenatal care.    Tristian Liu MD  02/17/2025

## 2025-02-17 NOTE — TELEPHONE ENCOUNTER
Caller: Glen Bey    Relationship: Self    Best call back number: [phone]      Who are you requesting to speak with (clinical staff, DR. PARMAR    What was the call regarding: PATIENT ADVISED THAT SHE IS RUNNING LATE, CAR BROKE DOWN, CAN BE HER IN  30 MINUTES,  HUB UNABLE TO WT, PLEASE ASSIST.     ”

## 2025-02-20 ENCOUNTER — ROUTINE PRENATAL (OUTPATIENT)
Dept: OBSTETRICS AND GYNECOLOGY | Facility: CLINIC | Age: 30
End: 2025-02-20
Payer: COMMERCIAL

## 2025-02-20 ENCOUNTER — HOSPITAL ENCOUNTER (INPATIENT)
Facility: HOSPITAL | Age: 30
LOS: 2 days | Discharge: HOME | End: 2025-02-22
Attending: OBSTETRICS & GYNECOLOGY | Admitting: OBSTETRICS & GYNECOLOGY
Payer: COMMERCIAL

## 2025-02-20 VITALS — WEIGHT: 178.4 LBS | SYSTOLIC BLOOD PRESSURE: 112 MMHG | BODY MASS INDEX: 29.69 KG/M2 | DIASTOLIC BLOOD PRESSURE: 76 MMHG

## 2025-02-20 DIAGNOSIS — O36.8130 DECREASED FETAL MOVEMENTS IN THIRD TRIMESTER, SINGLE OR UNSPECIFIED FETUS: ICD-10-CM

## 2025-02-20 DIAGNOSIS — O23.43 URINARY TRACT INFECTION IN MOTHER DURING THIRD TRIMESTER OF PREGNANCY: ICD-10-CM

## 2025-02-20 DIAGNOSIS — R10.9 CRAMPING AFFECTING PREGNANCY, ANTEPARTUM: Primary | ICD-10-CM

## 2025-02-20 DIAGNOSIS — R51.9 NONINTRACTABLE HEADACHE, UNSPECIFIED CHRONICITY PATTERN, UNSPECIFIED HEADACHE TYPE: ICD-10-CM

## 2025-02-20 DIAGNOSIS — O12.03 EDEMA IN PREGNANCY IN THIRD TRIMESTER: ICD-10-CM

## 2025-02-20 DIAGNOSIS — Z34.93 PRENATAL CARE, THIRD TRIMESTER: ICD-10-CM

## 2025-02-20 DIAGNOSIS — O47.00 PREMATURE UTERINE CONTRACTIONS: ICD-10-CM

## 2025-02-20 DIAGNOSIS — R82.90 ABNORMAL URINALYSIS: ICD-10-CM

## 2025-02-20 DIAGNOSIS — Z3A.33 33 WEEKS GESTATION OF PREGNANCY: ICD-10-CM

## 2025-02-20 DIAGNOSIS — O26.899 CRAMPING AFFECTING PREGNANCY, ANTEPARTUM: Primary | ICD-10-CM

## 2025-02-20 PROBLEM — O60.00 PRETERM LABOR: Status: ACTIVE | Noted: 2025-02-20

## 2025-02-20 PROBLEM — O60.03 PRETERM LABOR IN THIRD TRIMESTER WITHOUT DELIVERY: Status: ACTIVE | Noted: 2025-02-20

## 2025-02-20 LAB
ABO GROUP BLD: NORMAL
ALBUMIN SERPL-MCNC: 3.3 G/DL (ref 3.5–5.2)
ALBUMIN/GLOB SERPL: 1.4 G/DL
ALP SERPL-CCNC: 76 U/L (ref 39–117)
ALT SERPL W P-5'-P-CCNC: 5 U/L (ref 1–33)
ANION GAP SERPL CALCULATED.3IONS-SCNC: 13 MMOL/L (ref 5–15)
AST SERPL-CCNC: 10 U/L (ref 1–32)
BILIRUB BLD-MCNC: NEGATIVE MG/DL
BILIRUB SERPL-MCNC: 0.2 MG/DL (ref 0–1.2)
BLD GP AB SCN SERPL QL: NEGATIVE
BUN SERPL-MCNC: 9 MG/DL (ref 6–20)
BUN/CREAT SERPL: 24.3 (ref 7–25)
CALCIUM SPEC-SCNC: 8.9 MG/DL (ref 8.6–10.5)
CHLORIDE SERPL-SCNC: 103 MMOL/L (ref 98–107)
CLARITY, POC: ABNORMAL
CO2 SERPL-SCNC: 21 MMOL/L (ref 22–29)
COLOR UR: YELLOW
CREAT SERPL-MCNC: 0.37 MG/DL (ref 0.57–1)
D-LACTATE SERPL-SCNC: 0.9 MMOL/L (ref 0.5–2)
DEPRECATED RDW RBC AUTO: 40.4 FL (ref 37–54)
EGFRCR SERPLBLD CKD-EPI 2021: 140.2 ML/MIN/1.73
ERYTHROCYTE [DISTWIDTH] IN BLOOD BY AUTOMATED COUNT: 12.6 % (ref 12.3–15.4)
EXPIRATION DATE: 0
GLOBULIN UR ELPH-MCNC: 2.3 GM/DL
GLUCOSE SERPL-MCNC: 77 MG/DL (ref 65–99)
GLUCOSE UR STRIP-MCNC: NEGATIVE MG/DL
GLUCOSE UR STRIP-MCNC: NEGATIVE MG/DL
HCT VFR BLD AUTO: 28.5 % (ref 34–46.6)
HGB BLD-MCNC: 9.6 G/DL (ref 12–15.9)
KETONES UR QL: NEGATIVE
LDH SERPL-CCNC: 146 U/L (ref 135–214)
LEUKOCYTE EST, POC: ABNORMAL
Lab: 0
MCH RBC QN AUTO: 29.5 PG (ref 26.6–33)
MCHC RBC AUTO-ENTMCNC: 33.7 G/DL (ref 31.5–35.7)
MCV RBC AUTO: 87.7 FL (ref 79–97)
NITRITE UR-MCNC: NEGATIVE MG/ML
PH UR: 6 [PH] (ref 5–8)
PLATELET # BLD AUTO: 317 10*3/MM3 (ref 140–450)
PMV BLD AUTO: 10 FL (ref 6–12)
POTASSIUM SERPL-SCNC: 4.1 MMOL/L (ref 3.5–5.2)
PROT SERPL-MCNC: 5.6 G/DL (ref 6–8.5)
PROT UR STRIP-MCNC: ABNORMAL MG/DL
PROT UR STRIP-MCNC: ABNORMAL MG/DL
RBC # BLD AUTO: 3.25 10*6/MM3 (ref 3.77–5.28)
RBC # UR STRIP: NEGATIVE /UL
RH BLD: POSITIVE
SODIUM SERPL-SCNC: 137 MMOL/L (ref 136–145)
SP GR UR: 1.02 (ref 1–1.03)
T&S EXPIRATION DATE: NORMAL
TREPONEMA PALLIDUM IGG+IGM AB [PRESENCE] IN SERUM OR PLASMA BY IMMUNOASSAY: NORMAL
URATE SERPL-MCNC: 3.1 MG/DL (ref 2.4–5.7)
UROBILINOGEN UR QL: ABNORMAL
WBC NRBC COR # BLD AUTO: 12.91 10*3/MM3 (ref 3.4–10.8)

## 2025-02-20 PROCEDURE — 86901 BLOOD TYPING SEROLOGIC RH(D): CPT

## 2025-02-20 PROCEDURE — 59025 FETAL NON-STRESS TEST: CPT

## 2025-02-20 PROCEDURE — 85027 COMPLETE CBC AUTOMATED: CPT

## 2025-02-20 PROCEDURE — 86900 BLOOD TYPING SEROLOGIC ABO: CPT

## 2025-02-20 PROCEDURE — 25010000002 BETAMETHASONE ACET & SOD PHOS PER 4 MG: Performed by: OBSTETRICS & GYNECOLOGY

## 2025-02-20 PROCEDURE — G0463 HOSPITAL OUTPT CLINIC VISIT: HCPCS

## 2025-02-20 PROCEDURE — 83605 ASSAY OF LACTIC ACID: CPT

## 2025-02-20 PROCEDURE — 99221 1ST HOSP IP/OBS SF/LOW 40: CPT | Performed by: OBSTETRICS & GYNECOLOGY

## 2025-02-20 PROCEDURE — 96361 HYDRATE IV INFUSION ADD-ON: CPT

## 2025-02-20 PROCEDURE — 84156 ASSAY OF PROTEIN URINE: CPT | Performed by: OBSTETRICS & GYNECOLOGY

## 2025-02-20 PROCEDURE — 25810000003 LACTATED RINGERS PER 1000 ML: Performed by: OBSTETRICS & GYNECOLOGY

## 2025-02-20 PROCEDURE — 80053 COMPREHEN METABOLIC PANEL: CPT

## 2025-02-20 PROCEDURE — 86780 TREPONEMA PALLIDUM: CPT | Performed by: OBSTETRICS & GYNECOLOGY

## 2025-02-20 PROCEDURE — 83615 LACTATE (LD) (LDH) ENZYME: CPT | Performed by: OBSTETRICS & GYNECOLOGY

## 2025-02-20 PROCEDURE — 25010000002 CEFTRIAXONE PER 250 MG

## 2025-02-20 PROCEDURE — 86850 RBC ANTIBODY SCREEN: CPT

## 2025-02-20 PROCEDURE — 25810000003 LACTATED RINGERS SOLUTION

## 2025-02-20 PROCEDURE — 82570 ASSAY OF URINE CREATININE: CPT | Performed by: OBSTETRICS & GYNECOLOGY

## 2025-02-20 PROCEDURE — 84550 ASSAY OF BLOOD/URIC ACID: CPT | Performed by: OBSTETRICS & GYNECOLOGY

## 2025-02-20 PROCEDURE — 96372 THER/PROPH/DIAG INJ SC/IM: CPT

## 2025-02-20 PROCEDURE — 96360 HYDRATION IV INFUSION INIT: CPT

## 2025-02-20 PROCEDURE — 25010000002 PENICILLIN G POTASSIUM PER 600000 UNITS: Performed by: OBSTETRICS & GYNECOLOGY

## 2025-02-20 RX ORDER — SODIUM CHLORIDE 0.9 % (FLUSH) 0.9 %
10 SYRINGE (ML) INJECTION AS NEEDED
Status: DISCONTINUED | OUTPATIENT
Start: 2025-02-20 | End: 2025-02-22 | Stop reason: HOSPADM

## 2025-02-20 RX ORDER — SODIUM CHLORIDE 0.9 % (FLUSH) 0.9 %
10 SYRINGE (ML) INJECTION EVERY 12 HOURS SCHEDULED
Status: DISCONTINUED | OUTPATIENT
Start: 2025-02-20 | End: 2025-02-22 | Stop reason: HOSPADM

## 2025-02-20 RX ORDER — ONDANSETRON 4 MG/1
8 TABLET, ORALLY DISINTEGRATING ORAL EVERY 8 HOURS PRN
Status: DISCONTINUED | OUTPATIENT
Start: 2025-02-20 | End: 2025-02-22 | Stop reason: HOSPADM

## 2025-02-20 RX ORDER — SODIUM CHLORIDE 9 MG/ML
40 INJECTION, SOLUTION INTRAVENOUS AS NEEDED
Status: DISCONTINUED | OUTPATIENT
Start: 2025-02-20 | End: 2025-02-22 | Stop reason: HOSPADM

## 2025-02-20 RX ORDER — ACETAMINOPHEN 325 MG/1
650 TABLET ORAL EVERY 4 HOURS PRN
Status: DISCONTINUED | OUTPATIENT
Start: 2025-02-20 | End: 2025-02-22 | Stop reason: HOSPADM

## 2025-02-20 RX ORDER — LIDOCAINE HYDROCHLORIDE 10 MG/ML
0.5 INJECTION, SOLUTION EPIDURAL; INFILTRATION; INTRACAUDAL; PERINEURAL ONCE AS NEEDED
Status: DISCONTINUED | OUTPATIENT
Start: 2025-02-20 | End: 2025-02-22 | Stop reason: HOSPADM

## 2025-02-20 RX ORDER — SODIUM CHLORIDE, SODIUM LACTATE, POTASSIUM CHLORIDE, CALCIUM CHLORIDE 600; 310; 30; 20 MG/100ML; MG/100ML; MG/100ML; MG/100ML
125 INJECTION, SOLUTION INTRAVENOUS CONTINUOUS
Status: ACTIVE | OUTPATIENT
Start: 2025-02-20 | End: 2025-02-21

## 2025-02-20 RX ORDER — PENICILLIN G 3000000 [IU]/50ML
3 INJECTION, SOLUTION INTRAVENOUS EVERY 4 HOURS
Status: DISCONTINUED | OUTPATIENT
Start: 2025-02-20 | End: 2025-02-21

## 2025-02-20 RX ORDER — THIAMINE HYDROCHLORIDE 100 MG/ML
100 INJECTION, SOLUTION INTRAMUSCULAR; INTRAVENOUS ONCE
Status: CANCELLED | OUTPATIENT
Start: 2025-02-20 | End: 2025-02-20

## 2025-02-20 RX ORDER — BETAMETHASONE SODIUM PHOSPHATE AND BETAMETHASONE ACETATE 3; 3 MG/ML; MG/ML
12 INJECTION, SUSPENSION INTRA-ARTICULAR; INTRALESIONAL; INTRAMUSCULAR; SOFT TISSUE EVERY 24 HOURS
Status: COMPLETED | OUTPATIENT
Start: 2025-02-20 | End: 2025-02-21

## 2025-02-20 RX ORDER — ONDANSETRON 2 MG/ML
4 INJECTION INTRAMUSCULAR; INTRAVENOUS EVERY 8 HOURS PRN
Status: DISCONTINUED | OUTPATIENT
Start: 2025-02-20 | End: 2025-02-22 | Stop reason: HOSPADM

## 2025-02-20 RX ORDER — BISACODYL 10 MG
10 SUPPOSITORY, RECTAL RECTAL DAILY PRN
Status: DISCONTINUED | OUTPATIENT
Start: 2025-02-20 | End: 2025-02-22 | Stop reason: HOSPADM

## 2025-02-20 RX ORDER — DOCUSATE SODIUM 100 MG/1
100 CAPSULE, LIQUID FILLED ORAL 2 TIMES DAILY PRN
Status: DISCONTINUED | OUTPATIENT
Start: 2025-02-20 | End: 2025-02-22 | Stop reason: HOSPADM

## 2025-02-20 RX ADMIN — PENICILLIN G 3 MILLION UNITS: 3000000 INJECTION, SOLUTION INTRAVENOUS at 21:55

## 2025-02-20 RX ADMIN — SODIUM CHLORIDE 5 MILLION UNITS: 900 INJECTION INTRAVENOUS at 18:46

## 2025-02-20 RX ADMIN — SODIUM CHLORIDE 1000 MG: 900 INJECTION INTRAVENOUS at 17:49

## 2025-02-20 RX ADMIN — SODIUM CHLORIDE, SODIUM LACTATE, POTASSIUM CHLORIDE, CALCIUM CHLORIDE 125 ML/HR: 20; 30; 600; 310 INJECTION, SOLUTION INTRAVENOUS at 21:55

## 2025-02-20 RX ADMIN — BETAMETHASONE SODIUM PHOSPHATE AND BETAMETHASONE ACETATE 12 MG: 3; 3 INJECTION, SUSPENSION INTRA-ARTICULAR; INTRALESIONAL; INTRAMUSCULAR at 18:46

## 2025-02-20 RX ADMIN — SODIUM CHLORIDE, SODIUM LACTATE, POTASSIUM CHLORIDE, CALCIUM CHLORIDE 500 ML: 20; 30; 600; 310 INJECTION, SOLUTION INTRAVENOUS at 17:49

## 2025-02-20 NOTE — H&P
[facility]  Glen Mora  : 1995  MRN: [id number]  CSN: [id number]    History and Physical    Subjective   Chief Complaint   Patient presents with    Abdominal Cramping    Decreased Fetal Movement     Glen Mora is a 30 y.o. year old  with an Estimated Date of Delivery: 25 currently at 33w4d presenting with mild abdominal cramping and decreased fetal movement noted.  Her last children were born at 38 weeks..  Her urinalysis in the office shows that she has probably UTI.    Prenatal care has been with Dr. Liu.  It has been benign.    OB History    Para Term  AB Living   5 2 2 0 2 2   SAB IAB Ectopic Molar Multiple Live Births   2 0 0 0 0 2      # Outcome Date GA Lbr Kyle/2nd Weight Sex Type Anes PTL Lv   5 Current            4 SAB 2023 8w0d             Birth Comments: D&C   3 SAB 2022              Birth Comments: D&C   2 Term 20 38w4d / 00:43 3630 g (8 lb) M Vag-Spont EPI N BOBBY      Name: WILLIAMS MORA      Apgar1: 8  Apgar5: 9   1 Term 17 38w3d / 00:22 3280 g (7 lb 3.7 oz) F Vag-Spont EPI N BOBBY      Name: BRADLEY MORA      Apgar1: 9  Apgar5: 9      Obstetric Comments   FOB #1 : Pregnancy #1-2   2017-[facility]   2020 - [place]     Past Medical History:   Diagnosis Date    Postpartum care following vaginal delivery 2020    Threatened  2024    Anxiety     stopped med in 2019    Chlamydia     treated for chlamydia in beginning of this pregnancy-2017    Depression      Past Surgical History:   Procedure Laterality Date    APPENDECTOMY  2005    BREAST AUGMENTATION Bilateral     WISDOM TOOTH EXTRACTION         Current Facility-Administered Medications:     acetaminophen (TYLENOL) tablet 650 mg, 650 mg, Oral, Q4H PRN, Tristian Liu MD    betamethasone acetate-betamethasone sodium phosphate (CELESTONE SOLUSPAN) injection 12 mg, 12 mg, Intramuscular, Q24H, Tristian Liu MD, 12 mg at 25 4277     bisacodyl (DULCOLAX) suppository 10 mg, 10 mg, Rectal, Daily PRN, Tristian Liu MD    cefTRIAXone (ROCEPHIN) 1,000 mg in sodium chloride 0.9 % 100 mL MBP, 1,000 mg, Intravenous, Q24H, Lily Darby APRN, Last Rate: 200 mL/hr at 02/20/25 1749, 1,000 mg at 02/20/25 1749    docusate sodium (COLACE) capsule 100 mg, 100 mg, Oral, BID PRN, Tristian Liu MD    lactated ringers bolus 500 mL, 500 mL, Intravenous, Once, Tristian Liu MD    lactated ringers infusion, 125 mL/hr, Intravenous, Continuous, Lily Darby APRN    lactated ringers infusion, 125 mL/hr, Intravenous, Continuous, Tristian Liu MD    lidocaine PF 1% (XYLOCAINE) injection 0.5 mL, 0.5 mL, Intradermal, Once PRN, Tristian Liu MD    ondansetron ODT (ZOFRAN-ODT) disintegrating tablet 8 mg, 8 mg, Oral, Q8H PRN **OR** ondansetron (ZOFRAN) injection 4 mg, 4 mg, Intravenous, Q8H PRN, Tristian Liu MD    penicillin G potassium 5 Million Units in sodium chloride 0.9 % 100 mL MBP, 5 Million Units, Intravenous, Once, 5 Million Units at 02/20/25 1846 **FOLLOWED BY** penicillin G in iso-osmotic dextrose IVPB 3 million units (premix), 3 Million Units, Intravenous, Q4H, Tristian Liu MD    sodium chloride 0.9 % flush 10 mL, 10 mL, Intravenous, Q12H, Lily Darby APRN    sodium chloride 0.9 % flush 10 mL, 10 mL, Intravenous, PRN, Lily Darby APRN    sodium chloride 0.9 % flush 10 mL, 10 mL, Intravenous, Q12H, Tristian Liu MD    sodium chloride 0.9 % flush 10 mL, 10 mL, Intravenous, PRN, Tristian Liu MD    sodium chloride 0.9 % infusion 40 mL, 40 mL, Intravenous, PRN, Tristian Liu MD    No Known Allergies  Social History    Tobacco Use      Smoking status: Never      Smokeless tobacco: Never    Review of Systems   Gastrointestinal:  Positive for abdominal pain.   Genitourinary:  Positive for dysuria.         Objective   /70   Pulse 93   Temp 98.2 °F (36.8 °C)    Resp 16   LMP 06/04/2023   General: well developed; well nourished  no acute distress  mentation appropriate   Abdomen: soft, non-tender; no masses  no umbilical or inguinal hernias are present  no hepato-splenomegaly patient is 34 weeks size pregnant.   FHT's: reassuring and category 1      Cervix: was checked (by nurse practitioner): 1 cm / 50 % / -2   Presentation: cephalic   Contractions: irregular   Back: CVA tenderness is absent       Prenatal Labs  Lab Results   Component Value Date    HGB 9.6 (L) 02/20/2025    RUBELLAABIGG 2.11 08/12/2024    HEPBSAG Negative 08/12/2024    ABSCRN Negative 01/20/2025    FIG2ZKJ1 Non Reactive 08/12/2024    HEPCVIRUSABY Non Reactive 08/12/2024    GCT 91 01/20/2025    NSR9LFXA 73 09/25/2017    STREPGPB Negative 05/29/2020    URINECX No growth 01/31/2025    CHLAMNAA Negative 08/12/2024    NGONORRHON Negative 08/12/2024       Current Labs Reviewed          Assessment   IUP at 33w4d  UTI.  Possible premature onset of labor  4.   Decreased fetal movement     Plan   Observation, IV fluids, steroids for fetus possible magnesium if contractions become a problem.  Antibiotics.    Ludres Baker MD  2/20/2025  18:47 EST

## 2025-02-20 NOTE — PROGRESS NOTES
"        CC- decreased fetal movement       Glen Bey is a 30 y.o.  33w4d patient being seen today for decreased fetal movement. The last time she \"felt her move [was] around 9 and [she] usually feels movements and [she] ate and drank water... no bleeding. just a-lot of pelvic pressure and cramping that isn’t really seeming to ease up.\" Patient states she started feeling fetal movement as she walked into the office today. She describes the cramping last night as \"intense\" and \"consistent\"; they continue today.    Also reports trace BLE edema that can be painful and daily headaches for the past 2 weeks (MD recommended Excedrin helps a little; denies vision changes).    Her prenatal care is complicated by (and status) :    Patient Active Problem List   Diagnosis    Depression with anxiety    Positive urine pregnancy test    Attention deficit hyperactivity disorder (ADHD), predominantly inattentive type    Nausea and vomiting in pregnancy    Nausea and vomiting during pregnancy    Pelvic pain during pregnancy in second trimester, antepartum     Ultrasound Today: No.    Non Stress Test: Yes, 20+ minutes  non-stress test: NST: Reactive  indication: Decreased Fetal Movement      ROS -   Patient Denies: Loss of Fluid, Vaginal Spotting, Vision Changes, Nausea , and Vomiting   Fetal Movement: decreased  All other systems reviewed and are negative.       The additional following portions of the patient's history were reviewed and updated as appropriate: allergies, current medications, past family history, past medical history, past social history, past surgical history, and problem list.    I have reviewed and agree with the HPI, ROS, and historical information as entered above. Lily Darby, APRN      /76   Wt 80.9 kg (178 lb 6.4 oz)   LMP 2023   BMI 29.69 kg/m²       EXAM:     Prenatal Vitals  BP: 112/76  Weight: 80.9 kg (178 lb 6.4 oz)   Fetal Heart Rate: NST+ "   Dilation/Effacement/Station  Dilation: 1  Effacement (%): 60  Station: -2            Assessment and Plan    Problem List Items Addressed This Visit    None  Visit Diagnoses       Cramping affecting pregnancy, antepartum    -  Primary    Relevant Orders    POC Urinalysis Dipstick (Completed)    Urine Culture - Urine, Urine, Clean Catch    Prenatal care, third trimester        Relevant Orders    POC Glucose, Urine, Qualitative, Dipstick (Completed)    POC Protein, Urine, Qualitative, Dipstick (Completed)    Fetal Nonstress Test    POC Urinalysis Dipstick (Completed)    Urine Culture - Urine, Urine, Clean Catch    33 weeks gestation of pregnancy        Relevant Orders    POC Glucose, Urine, Qualitative, Dipstick (Completed)    POC Protein, Urine, Qualitative, Dipstick (Completed)    Fetal Nonstress Test    POC Urinalysis Dipstick (Completed)    Urine Culture - Urine, Urine, Clean Catch    Decreased fetal movements in third trimester, single or unspecified fetus        Relevant Orders    Fetal Nonstress Test    Nonintractable headache, unspecified chronicity pattern, unspecified headache type        Relevant Orders    Comprehensive Metabolic Panel    Edema in pregnancy in third trimester        Relevant Orders    Comprehensive Metabolic Panel    Abnormal urinalysis        Relevant Orders    Urine Culture - Urine, Urine, Clean Catch    Urinary tract infection in mother during third trimester of pregnancy        Premature uterine contractions                Pregnancy at 33w4d  Fetal status reassuring. 20 min 15 x 15 reactive with 2 ctx and irritability.   NST reactive today.  Sent to [facility] for continuous monitoring.  PIH precautions reviewed  Labor precautions reviewed.  CCUA positive, will treat with abx and send for culture. Will notify patient of results.  RTC for worsening symptoms  Discussed with OP-KEILA/UTI send to [facility]U for IVF/ABX/Recheck cervix 1-2 hrs    A portion of the care provided today was to address decreased  fetal movement and cramping, which is outside the scope of routine prenatal care.     Lily Darby, APRN  02/20/2025

## 2025-02-21 ENCOUNTER — APPOINTMENT (OUTPATIENT)
Dept: WOMENS IMAGING | Facility: HOSPITAL | Age: 30
End: 2025-02-21
Payer: COMMERCIAL

## 2025-02-21 LAB
CREAT UR-MCNC: 112.7 MG/DL
PROT ?TM UR-MCNC: 19.7 MG/DL
PROT/CREAT UR: 174.8 MG/G CREA (ref 0–200)

## 2025-02-21 PROCEDURE — 99232 SBSQ HOSP IP/OBS MODERATE 35: CPT | Performed by: OBSTETRICS & GYNECOLOGY

## 2025-02-21 PROCEDURE — 96372 THER/PROPH/DIAG INJ SC/IM: CPT

## 2025-02-21 PROCEDURE — 25010000002 CEFTRIAXONE PER 250 MG

## 2025-02-21 PROCEDURE — 96361 HYDRATE IV INFUSION ADD-ON: CPT

## 2025-02-21 PROCEDURE — 76819 FETAL BIOPHYS PROFIL W/O NST: CPT | Performed by: OBSTETRICS & GYNECOLOGY

## 2025-02-21 PROCEDURE — 76811 OB US DETAILED SNGL FETUS: CPT | Performed by: OBSTETRICS & GYNECOLOGY

## 2025-02-21 PROCEDURE — 25810000003 LACTATED RINGERS PER 1000 ML: Performed by: OBSTETRICS & GYNECOLOGY

## 2025-02-21 PROCEDURE — 59025 FETAL NON-STRESS TEST: CPT

## 2025-02-21 PROCEDURE — 76811 OB US DETAILED SNGL FETUS: CPT

## 2025-02-21 PROCEDURE — 25010000002 PENICILLIN G POTASSIUM PER 600000 UNITS: Performed by: OBSTETRICS & GYNECOLOGY

## 2025-02-21 PROCEDURE — 76819 FETAL BIOPHYS PROFIL W/O NST: CPT

## 2025-02-21 PROCEDURE — 25010000002 BETAMETHASONE ACET & SOD PHOS PER 4 MG: Performed by: OBSTETRICS & GYNECOLOGY

## 2025-02-21 RX ADMIN — SODIUM CHLORIDE, SODIUM LACTATE, POTASSIUM CHLORIDE, CALCIUM CHLORIDE 125 ML/HR: 20; 30; 600; 310 INJECTION, SOLUTION INTRAVENOUS at 06:23

## 2025-02-21 RX ADMIN — SODIUM CHLORIDE 1000 MG: 900 INJECTION INTRAVENOUS at 18:12

## 2025-02-21 RX ADMIN — PENICILLIN G 3 MILLION UNITS: 3000000 INJECTION, SOLUTION INTRAVENOUS at 09:53

## 2025-02-21 RX ADMIN — ACETAMINOPHEN 650 MG: 325 TABLET ORAL at 05:10

## 2025-02-21 RX ADMIN — PENICILLIN G 3 MILLION UNITS: 3000000 INJECTION, SOLUTION INTRAVENOUS at 06:23

## 2025-02-21 RX ADMIN — PENICILLIN G 3 MILLION UNITS: 3000000 INJECTION, SOLUTION INTRAVENOUS at 02:57

## 2025-02-21 RX ADMIN — ACETAMINOPHEN 650 MG: 325 TABLET ORAL at 15:53

## 2025-02-21 RX ADMIN — BETAMETHASONE SODIUM PHOSPHATE AND BETAMETHASONE ACETATE 12 MG: 3; 3 INJECTION, SUSPENSION INTRA-ARTICULAR; INTRALESIONAL; INTRAMUSCULAR at 18:12

## 2025-02-21 NOTE — PROGRESS NOTES
Daily Progress Note    Patient name: Glen Bey  YOB: 1995   MRN: [id number]  Admission Date: 2025  Date of Service: 2025  Referring Provider: Tristian Liu MD    Glen Bey is a 30 y.o.    at 33w5d  admitted on 2025 for  labor    Hospital day 1      Diagnoses:   Patient Active Problem List    Diagnosis     * labor [O60.00]     Decreased fetal movements in third trimester [O36.8130]      labor in third trimester without delivery [O60.03]     Pelvic pain during pregnancy in second trimester, antepartum [O26.892, R10.2]     Nausea and vomiting during pregnancy [O21.9]     Nausea and vomiting in pregnancy [O21.9]     Attention deficit hyperactivity disorder (ADHD), predominantly inattentive type [F90.0]     Depression with anxiety [F41.8]     Positive urine pregnancy test [Z32.01]        Chief Complaint:  Chief Complaint   Patient presents with    Abdominal Cramping    Decreased Fetal Movement       Subjective:      Glen has no complaints today.  Reports fetal movement is normal  Denies leakage of amniotic fluid.  Denies vaginal bleeding  No cervical change overnight. Due to intermittent fetal heart rate decelerations overnight, PDC scan performed today. Reviewed with patient.     Objective:     Vital signs:  Temp:  [98.2 °F (36.8 °C)-98.8 °F (37.1 °C)] 98.4 °F (36.9 °C)  Heart Rate:  [] 88  Resp:  [16] 16  BP: ()/(51-76) 103/57    Abdomen: soft, nontender  Uterus: gravid, nontender  Extremities: nontender; no edema        Non-Stress Test:    Fetal Heart Rate Assessment   Method: Fetal HR Assessment Method: external   Beats/min: Fetal HR (beats/min): 135   Baseline: Fetal HR Baseline: normal range   Variability: Fetal HR Variability: moderate (amplitude range 6 to 25 bpm)   Accels: Fetal HR Accelerations: absent   Decels: Fetal HR Decelerations: variable   Tracing Category:       Uterine Assessment   Method: Method: palpation    Frequency (min): Contraction Frequency (Minutes): x2   Ctx Count in 10 min:     Duration:     Intensity: Contraction Intensity: no contractions   Intensity by IUPC:     Resting Tone: Uterine Resting Tone: soft by palpation   Resting Tone by IUPC:     [place]ud[place] Units:       Cervix: Exam by: Method: sterile vaginal exam performed (MD jonatan)   Dilation:     Effacement: Cervical Effacement: 50   Station:         Most recent ultrasound:  2/21/25    Medications:  betamethasone acetate-betamethasone sodium phosphate, 12 mg, Intramuscular, Q24H  cefTRIAXone, 1,000 mg, Intravenous, Q24H  lactated ringers, 500 mL, Intravenous, Once  sodium chloride, 10 mL, Intravenous, Q12H  sodium chloride, 10 mL, Intravenous, Q12H         acetaminophen    bisacodyl    docusate sodium    lidocaine PF 1%    ondansetron ODT **OR** ondansetron    sodium chloride    sodium chloride    sodium chloride    Labs:  Lab Results (last 24 hours)       Procedure Component Value Units Date/Time    Protein / Creatinine Ratio, Urine - Urine, Clean Catch [[id number]] Collected: 02/20/25 1856    Specimen: Urine, Clean Catch Updated: 02/21/25 0023     Protein/Creatinine Ratio, Urine 174.8 mg/G Crea      Creatinine, Urine 112.7 mg/dL      Total Protein, Urine 19.7 mg/dL     Treponema pallidum AB w/Reflex RPR [[id number]]  (Normal) Collected: 02/20/25 1820    Specimen: Blood Updated: 02/20/25 2340     Treponemal AB Total Non-Reactive    Narrative:      Reactive results will reflex RPR testing.    Comprehensive Metabolic Panel [[id number]]  (Abnormal) Collected: 02/20/25 1809    Specimen: Blood Updated: 02/20/25 1859     Glucose 77 mg/dL      BUN 9 mg/dL      Creatinine 0.37 mg/dL      Sodium 137 mmol/L      Potassium 4.1 mmol/L      Chloride 103 mmol/L      CO2 21.0 mmol/L      Calcium 8.9 mg/dL      Total Protein 5.6 g/dL      Albumin 3.3 g/dL      ALT (SGPT) 5 U/L      AST (SGOT) 10 U/L      Alkaline Phosphatase 76 U/L      Total Bilirubin 0.2 mg/dL       Globulin 2.3 gm/dL      Comment: Calculated Result        A/G Ratio 1.4 g/dL      BUN/Creatinine Ratio 24.3     Anion Gap 13.0 mmol/L      eGFR 140.2 mL/min/1.73     Narrative:      GFR Categories in Chronic Kidney Disease (CKD)      GFR Category          GFR (mL/min/1.73)    Interpretation  G1                     90 or greater         Normal or high (1)  G2                      60-89                Mild decrease (1)  G3a                   45-59                Mild to moderate decrease  G3b                   30-44                Moderate to severe decrease  G4                    15-29                Severe decrease  G5                    14 or less           Kidney failure          (1)In the absence of evidence of kidney disease, neither GFR category G1 or G2 fulfill the criteria for CKD.    eGFR calculation 2021 CKD-EPI creatinine equation, which does not include race as a factor    Lactate Dehydrogenase [[id number]]  (Normal) Collected: 02/20/25 1809    Specimen: Blood Updated: 02/20/25 1859      U/L     Uric Acid [[id number]]  (Normal) Collected: 02/20/25 1809    Specimen: Blood Updated: 02/20/25 1859     Uric Acid 3.1 mg/dL      Comment: Falsely depressed results may occur on samples drawn from patients receiving N-Acetylcysteine (NAC) or Metamizole.       Lactic Acid, Plasma [[id number]]  (Normal) Collected: 02/20/25 1808    Specimen: Blood Updated: 02/20/25 1852     Lactate 0.9 mmol/L      Comment: Falsely depressed results may occur on samples drawn from patients receiving N-Acetylcysteine (NAC) or Metamizole.       CBC (No Diff) [[id number]]  (Abnormal) Collected: 02/20/25 1808    Specimen: Blood Updated: 02/20/25 1838     WBC 12.91 10*3/mm3      RBC 3.25 10*6/mm3      Hemoglobin 9.6 g/dL      Hematocrit 28.5 %      MCV 87.7 fL      MCH 29.5 pg      MCHC 33.7 g/dL      RDW 12.6 %      RDW-SD 40.4 fl      MPV 10.0 fL      Platelets 317 10*3/mm3           Lab Results   Component Value Date    HGB 9.6 (L)  2025         Assessment/Plan:      Glen is a 30 y.o.    at 33w5d.  1.  labor: Resolved.   2. UTI: continue rocephin  3. Fetal: NSTs TID; Second dose of steroids this afternoon. Would keep inpatient until through steroid window.   4. Delivery plan: cephalic, will proceed with IOL if she needs to be delivered over the weekend.      All questions were answered to the best of my ability.    Tristian Liu MD  2025

## 2025-02-22 VITALS
DIASTOLIC BLOOD PRESSURE: 71 MMHG | TEMPERATURE: 98.3 F | RESPIRATION RATE: 16 BRPM | SYSTOLIC BLOOD PRESSURE: 115 MMHG | HEART RATE: 102 BPM

## 2025-02-22 PROBLEM — O60.00 PRETERM LABOR: Status: RESOLVED | Noted: 2025-02-20 | Resolved: 2025-02-22

## 2025-02-22 PROBLEM — O21.9 NAUSEA AND VOMITING DURING PREGNANCY: Status: RESOLVED | Noted: 2024-08-12 | Resolved: 2025-02-22

## 2025-02-22 PROBLEM — Z32.01 POSITIVE URINE PREGNANCY TEST: Status: RESOLVED | Noted: 2022-05-26 | Resolved: 2025-02-22

## 2025-02-22 PROBLEM — O21.9 NAUSEA AND VOMITING IN PREGNANCY: Status: RESOLVED | Noted: 2023-05-02 | Resolved: 2025-02-22

## 2025-02-22 PROBLEM — O36.8130 DECREASED FETAL MOVEMENTS IN THIRD TRIMESTER: Status: RESOLVED | Noted: 2025-02-20 | Resolved: 2025-02-22

## 2025-02-22 LAB
BACTERIA UR CULT: NORMAL
BACTERIA UR CULT: NORMAL

## 2025-02-22 PROCEDURE — 59025 FETAL NON-STRESS TEST: CPT | Performed by: OBSTETRICS & GYNECOLOGY

## 2025-02-22 PROCEDURE — 59025 FETAL NON-STRESS TEST: CPT

## 2025-02-22 PROCEDURE — 99238 HOSP IP/OBS DSCHRG MGMT 30/<: CPT | Performed by: OBSTETRICS & GYNECOLOGY

## 2025-02-22 NOTE — PROGRESS NOTES
Demetria Bey  : 1995  MRN: [id number]  CSN: [id number]    Hospital Day: 3    CC: hospital follow-up for  labor    Antepartum Progress Note    Subjective   She has completed both doses of betamethasone having received her last dose at roughly 6 PM last night.  Feeling very little in terms of contractions or abdominal pain.  Having no discharge or leaking.    Review of Systems   Constitutional:  Negative for unexpected weight change.   Respiratory:  Negative for cough and shortness of breath.    Cardiovascular:  Negative for chest pain.   Gastrointestinal:  Negative for abdominal distention, constipation and diarrhea.        No persistent bloating   Genitourinary:  Negative for difficulty urinating, dysuria, hematuria and urgency.        No significant incontinence   Skin:  Negative for rash.   Neurological:  Negative for headaches.          Objective     Min/max vitals past 24 hours:   Temp  Min: 97.9 °F (36.6 °C)  Max: 98.7 °F (37.1 °C)  BP  Min: 96/55  Max: 115/71  Pulse  Min: 89  Max: 115  Resp  Min: 16  Max: 18         General: well developed; well nourished  no acute distress  mentation appropriate   Heart: Not performed.   Lungs: breathing is unlabored   Abdomen: soft, non-tender; no masses  no umbilical or inguinal hernias are present  no hepato-splenomegaly   FHT's: reassuring and category 1   Cervix: was not checked.   Contractions: none   Back: CVA tenderness is absent bilateral            Assessment   IUP at 33w6d   labor versus threatened  labor overall stable.  She is completed steroids  UTI (based on urinalysis) on Rocephin.  Urine culture has actually returned with mixed skin geraldo.     Plan   Discharge to home  Does not need antibiotics at discharge  Scheduled follow-up with Dr. Howard in the coming week      Non Stress Test      Estimated Date of Delivery: 25  Gestational Age: 33w6d    Indication for NST  labor       Total Time on NST > 20  minutes       Interpretation    Baseline  beats per minute   Category 1   Decelerations Absent       Additional Comments none         Haresh Ghotra MD  2/22/2025  10:40 EST

## 2025-02-22 NOTE — DISCHARGE SUMMARY
ABEBE Augustin   Glen Bey  : 1995  MRN: [id number]  CSN: [id number]    Discharge Summary      Date of Admission: 2025   Date of Discharge: 2025   Discharge Diagnoses:  labor@ 33w6d   Procedures Performed: None   Consults: none   Brief History: Patient is a 30 y.o.  who presented with mild abdominal cramping and decreased fetal movement.  In the office she had a urinalysis that was suggestive of a UTI.  Given these findings she was admitted from the office   Hospital Course: She was given Rocephin for presumed UTI.  She was given steroids and completed her steroid course.  Urine culture ended up not consistent with a UTI.  She did not contract regularly and felt capable of going home on the day of discharge   Pending Studies: None    Condition at discharge: Improved   Discharge Medications:    Your medication list          CONTINUE taking these medications        Instructions   ferrous sulfate 325 (65 FE) MG tablet      Take 1 tablet by mouth Daily With Breakfast.     ondansetron ODT 4 MG disintegrating tablet  Commonly known as: ZOFRAN-ODT      Take 1 tablet by mouth Every 8 (Eight) Hours As Needed for Nausea or Vomiting.     prenatal vitamin 27-0.8 27-0.8 MG tablet tablet      Take 1 tablet by mouth Daily.     promethazine 12.5 MG tablet  Commonly known as: PHENERGAN      Take 1 tablet by mouth Every 6 (Six) Hours As Needed for Nausea.             Discharge Disposition: home   Follow-up: Future Appointments   Date Time Provider Department Center   3/3/2025  8:50 AM Tristian Liu MD [facility]   3/31/2025  9:30 PM [facility] INDUCTION ROOM 1 [facility]          This note has been electronically signed.    Haresh Ghotra MD  2025

## 2025-02-25 ENCOUNTER — ROUTINE PRENATAL (OUTPATIENT)
Dept: OBSTETRICS AND GYNECOLOGY | Facility: CLINIC | Age: 30
End: 2025-02-25
Payer: COMMERCIAL

## 2025-02-25 VITALS — SYSTOLIC BLOOD PRESSURE: 122 MMHG | WEIGHT: 180 LBS | DIASTOLIC BLOOD PRESSURE: 78 MMHG | BODY MASS INDEX: 29.95 KG/M2

## 2025-02-25 DIAGNOSIS — O26.892 PELVIC PAIN DURING PREGNANCY IN SECOND TRIMESTER, ANTEPARTUM: Primary | ICD-10-CM

## 2025-02-25 DIAGNOSIS — O60.03 PRETERM LABOR IN THIRD TRIMESTER WITHOUT DELIVERY: ICD-10-CM

## 2025-02-25 DIAGNOSIS — R10.2 PELVIC PAIN DURING PREGNANCY IN SECOND TRIMESTER, ANTEPARTUM: Primary | ICD-10-CM

## 2025-02-25 NOTE — PROGRESS NOTES
OB FOLLOW UP  CC- Here for care of pregnancy        Glen Bey is a 30 y.o.  34w2d patient being seen today for her obstetrical follow up visit. Patient reports no complaints.     Her prenatal care is complicated by (and status) : see below.  Patient Active Problem List   Diagnosis    Depression with anxiety    Attention deficit hyperactivity disorder (ADHD), predominantly inattentive type    Pelvic pain during pregnancy in second trimester, antepartum     labor in third trimester without delivery       Ultrasound Today: No  Non Stress Test: No.    ROS -   Patient Denies: Loss of Fluid, Vaginal Spotting, Vision Changes, Headaches, Nausea , Vomiting , Contractions, Epigastric pain, and skin itching  Fetal Movement : normal  Other than what is documented in the HPI, all other systems reviewed and are negative.       The additional following portions of the patient's history were reviewed and updated as appropriate: allergies, current medications, past family history, past medical history, past social history, past surgical history, and problem list.    I have reviewed and agree with the HPI, ROS, and historical information as entered above. Tristian Liu MD      /78   Wt 81.6 kg (180 lb)   LMP 2023   BMI 29.95 kg/m²       EXAM:     Prenatal Vitals  BP: 122/78  Weight: 81.6 kg (180 lb)                               Assessment and Plan    Problem List Items Addressed This Visit       Pelvic pain during pregnancy in second trimester, antepartum - Primary     labor in third trimester without delivery       Pregnancy at 34w2d  Fetal status reassuring.   Activity and Exercise discussed.  Fetal movement/PTL or Labor precautions  Patient is on Prenatal vitamins  Reviewed Pre-eclampsia signs/symptoms  GBS next visit  Return in about 6 days (around 3/3/2025) for Routine prenatal care.    Tristian Liu MD  2025   
For information on Fall & Injury Prevention, visit: [email or web address] OR  [email or web address] OR  https://www.cdc.gov/steadi/patient.html

## 2025-03-03 ENCOUNTER — TELEPHONE (OUTPATIENT)
Dept: OBSTETRICS AND GYNECOLOGY | Facility: CLINIC | Age: 30
End: 2025-03-03

## 2025-03-03 NOTE — TELEPHONE ENCOUNTER
Caller: LELE MORA    Best call back number: [phone]    PATIENT CALLED REQUESTING TO CANCEL SAME DAY APPT.    Did the patient call AFTER the start time of their scheduled appointment?  []YES  [x]NO    Was the patient's appointment rescheduled? [x]YES  []NO

## 2025-03-05 ENCOUNTER — HOSPITAL ENCOUNTER (INPATIENT)
Facility: HOSPITAL | Age: 30
LOS: 1 days | Discharge: HOME | End: 2025-03-05
Attending: OBSTETRICS & GYNECOLOGY | Admitting: OBSTETRICS & GYNECOLOGY
Payer: COMMERCIAL

## 2025-03-05 VITALS
DIASTOLIC BLOOD PRESSURE: 73 MMHG | OXYGEN SATURATION: 99 % | RESPIRATION RATE: 15 BRPM | HEART RATE: 96 BPM | TEMPERATURE: 98.2 F | SYSTOLIC BLOOD PRESSURE: 115 MMHG

## 2025-03-05 PROBLEM — O47.03 PRETERM UTERINE CONTRACTIONS, ANTEPARTUM, THIRD TRIMESTER: Status: ACTIVE | Noted: 2025-03-05

## 2025-03-05 LAB
BILIRUB UR QL STRIP: NEGATIVE
CLARITY UR: CLEAR
COLOR UR: YELLOW
GLUCOSE UR STRIP-MCNC: NEGATIVE MG/DL
HGB UR QL STRIP.AUTO: NEGATIVE
KETONES UR QL STRIP: ABNORMAL
LEUKOCYTE ESTERASE UR QL STRIP.AUTO: NEGATIVE
NITRITE UR QL STRIP: NEGATIVE
PH UR STRIP.AUTO: 7.5 [PH] (ref 5–8)
PROT UR QL STRIP: NEGATIVE
SP GR UR STRIP: 1.02 (ref 1–1.03)
UROBILINOGEN UR QL STRIP: ABNORMAL

## 2025-03-05 PROCEDURE — 59025 FETAL NON-STRESS TEST: CPT

## 2025-03-05 PROCEDURE — G0463 HOSPITAL OUTPT CLINIC VISIT: HCPCS

## 2025-03-05 PROCEDURE — 81003 URINALYSIS AUTO W/O SCOPE: CPT | Performed by: STUDENT IN AN ORGANIZED HEALTH CARE EDUCATION/TRAINING PROGRAM

## 2025-03-05 PROCEDURE — 99239 HOSP IP/OBS DSCHRG MGMT >30: CPT | Performed by: OBSTETRICS & GYNECOLOGY

## 2025-03-05 RX ORDER — ACETAMINOPHEN 325 MG/1
650 TABLET ORAL EVERY 4 HOURS PRN
Status: DISCONTINUED | OUTPATIENT
Start: 2025-03-05 | End: 2025-03-05 | Stop reason: HOSPADM

## 2025-03-05 RX ORDER — DOCUSATE SODIUM 100 MG/1
100 CAPSULE, LIQUID FILLED ORAL 2 TIMES DAILY PRN
Status: DISCONTINUED | OUTPATIENT
Start: 2025-03-05 | End: 2025-03-05 | Stop reason: HOSPADM

## 2025-03-05 RX ORDER — CITRIC ACID/SODIUM CITRATE 334-500MG
SOLUTION, ORAL ORAL
Status: DISCONTINUED
Start: 2025-03-05 | End: 2025-03-05 | Stop reason: HOSPADM

## 2025-03-05 RX ORDER — ONDANSETRON 4 MG/1
8 TABLET, ORALLY DISINTEGRATING ORAL EVERY 8 HOURS PRN
Status: DISCONTINUED | OUTPATIENT
Start: 2025-03-05 | End: 2025-03-05 | Stop reason: HOSPADM

## 2025-03-05 RX ORDER — FAMOTIDINE 20 MG/1
20 TABLET, FILM COATED ORAL DAILY
Status: DISCONTINUED | OUTPATIENT
Start: 2025-03-05 | End: 2025-03-05 | Stop reason: HOSPADM

## 2025-03-05 RX ORDER — BISACODYL 10 MG
10 SUPPOSITORY, RECTAL RECTAL DAILY PRN
Status: DISCONTINUED | OUTPATIENT
Start: 2025-03-05 | End: 2025-03-05 | Stop reason: HOSPADM

## 2025-03-05 NOTE — H&P
ABEBE Augustin  Obstetric History and Physical          Subjective     30-year-old -0-2-2 at 35 weeks and 3 days who presents to labor and delivery reporting painful contractions.  Patient feels as though she is campbell every 3 to 5 minutes for over an hour which prompted her to present for evaluation.  Patient was admitted on  and received betamethasone for  contractions with cervical change.  She denies vaginal bleeding, vaginal discharge, leakage of fluid.  She endorses good fetal movement.      Prenatal Information:  Prenatal Results       Initial Prenatal Labs       Test Value Reference Range Date Time    Hemoglobin  13.1 g/dL 11.1 - 15.9 24 1046    Hematocrit  40.6 % 34.0 - 46.6 24 1046    Platelets  343 x10E3/uL 150 - 450 24 1046    Rubella IgG  2.11 index Immune >0.99 24 1046    Hepatitis B SAg  Negative  Negative 24 1046    Hepatitis C Ab  Non Reactive  Non Reactive 24 1046    RPR  Non Reactive  Non Reactive 25        Non Reactive  Non Reactive 24 1046    T. Pallidum Ab   Non-Reactive  Non-Reactive 25 1820    ABO  AB   25 1808    Rh  Positive   25 1808    Antibody Screen  Negative  Negative 24 1046    HIV  Non Reactive  Non Reactive 24 1046    Urine Culture  Final report   25        No growth   25 2300       No growth   25 2100       Final report   24 1100    Gonorrhea  Negative  Negative 24 1100    Chlamydia  Negative  Negative 24 1100    TSH        HgB A1c         Varicella IgG        Hemoglobinopathy Fractionation        Hemoglobinopathy (genetic testing)        Cystic fibrosis         Spinal muscular atrophy        Fragile X                  Fetal testing        Test Value Reference Range Date Time    NIPT        MSAFP        AFP-4                  2nd and 3rd Trimester       Test Value Reference Range Date Time    Hemoglobin (repeated)  9.6 g/dL 12.0 - 15.9 25  1808       11.1 g/dL 12.0 - 15.9 01/20/25     Hematocrit (repeated)  28.5 % 34.0 - 46.6 02/20/25 1808       32.3 % 34.0 - 46.6 01/20/25     Platelets   317 10*3/mm3 140 - 450 02/20/25 1808       378 10*3/mm3 140 - 450 01/20/25        343 x10E3/uL 150 - 450 08/12/24 1046    1 hour GTT   91 mg/dL 65 - 139 01/20/25     Antibody Screen (repeated)  Negative   02/20/25 1808       Negative  Negative 01/20/25     3rd TM syphilis scrn (repeated)  RPR   Non Reactive  Non Reactive 01/20/25     3rd TM syphilis scrn (repeated) TP-Ab  Non-Reactive  Non-Reactive 02/20/25 1820    3rd TM syphilis screen TB-Ab (FTA)  Non-Reactive  Non-Reactive 02/20/25 1820    Syphilis cascade test TP-Ab (EIA)        Syphilis cascade TPPA        GTT Fasting        GTT 1 Hr        GTT 2 Hr        GTT 3 Hr        Group B Strep                  Other testing        Test Value Reference Range Date Time    [place] IgG         CMV IgG                   Drug Screening       Test Value Reference Range Date Time    Amphetamine Screen  Negative ng/mL Hpiaer=8064 09/23/24 1022       Negative ng/mL Aenygd=4090 08/12/24 1100    Barbiturate Screen  Negative ng/mL Vffxsk=796 09/23/24 1022       Negative ng/mL Tqibfd=865 08/12/24 1100    Benzodiazepine Screen  Negative ng/mL Xoazyd=828 09/23/24 1022       Negative ng/mL Ipgqok=567 08/12/24 1100    Methadone Screen  Negative ng/mL Lnhixz=251 09/23/24 1022       Negative ng/mL Krcoek=432 08/12/24 1100    Phencyclidine Screen  Negative ng/mL Cutoff=25 09/23/24 1022       Negative ng/mL Cutoff=25 08/12/24 1100    Opiates Screen  Negative ng/mL Ukjclg=558 09/23/24 1022       Negative ng/mL Aiuaea=164 08/12/24 1100    THC Screen  Negative ng/mL Cutoff=20 09/23/24 1022       Positive ng/mL Cutoff=20 08/12/24 1100    Cocaine Screen  Negative ng/mL Tzcwio=130 09/23/24 1022       Negative ng/mL Pwgbcw=894 08/12/24 1100    Propoxyphene Screen  Negative ng/mL Yrkfod=061 09/23/24 1022       Negative ng/mL Mggszz=051 08/12/24 1100     Buprenorphine Screen        Methamphetamine Screen        Oxycodone Screen        Tricyclic Antidepressants Screen                  Legend    ^: Historical                          External Prenatal Results       Pregnancy Outside Results - Transcribed From Office Records - See Scanned Records For Details       Test Value Date Time    ABO  AB  02/20/25 1808    Rh  Positive  02/20/25 1808    Antibody Screen  Negative  02/20/25 1808       Negative  01/20/25        Negative  08/12/24 1046    Varicella IgG       Rubella  2.11 index 08/12/24 1046    Hgb  9.6 g/dL 02/20/25 1808       11.1 g/dL 01/20/25        13.1 g/dL 08/12/24 1046    Hct  28.5 % 02/20/25 1808       32.3 % 01/20/25        40.6 % 08/12/24 1046    HgB A1c        1h GTT  91 mg/dL 01/20/25     3h GTT Fasting       3h GTT 1 hour       3h GTT 2 hour       3h GTT 3 hour        Gonorrhea (discrete)  Negative  08/12/24 1100    Chlamydia (discrete)  Negative  08/12/24 1100    RPR  Non Reactive  01/20/25        Non Reactive  08/12/24 1046    Syphils cascade: TP-Ab (FTA)  Non-Reactive  02/20/25 1820    TP-Ab  Non-Reactive  02/20/25 1820    TP-Ab (EIA)       TPPA       HBsAg  Negative  08/12/24 1046    Herpes Simplex Virus PCR       Herpes Simplex VIrus Culture       HIV  Non Reactive  08/12/24 1046    Hep C RNA Quant PCR       Hep C Antibody  Non Reactive  08/12/24 1046    AFP       NIPT       Cystic Fibrosis (Nik)       Cystic Fibroisis        Spinal Muscular atrophy       Fragile X       Group B Strep       GBS Susceptibility to Clindamycin       GBS Susceptibility to Erythromycin       Fetal Fibronectin       Genetic Testing, Maternal Blood                 Drug Screening       Test Value Date Time    Urine Drug Screen       Amphetamine Screen  Negative ng/mL 09/23/24 1022       Negative ng/mL 08/12/24 1100    Barbiturate Screen  Negative ng/mL 09/23/24 1022       Negative ng/mL 08/12/24 1100    Benzodiazepine Screen  Negative ng/mL 09/23/24 1022        Negative ng/mL 24 1100    Methadone Screen  Negative ng/mL 24 1022       Negative ng/mL 24 1100    Phencyclidine Screen  Negative ng/mL 24 1022       Negative ng/mL 24 1100    Opiates Screen       THC Screen       Cocaine Screen       Propoxyphene Screen  Negative ng/mL 24 1022       Negative ng/mL 24 1100    Buprenorphine Screen       Methamphetamine Screen       Oxycodone Screen       Tricyclic Antidepressants Screen                 Legend    ^: Historical                             Past OB History:     OB History    Para Term  AB Living   5 2 2 0 2 2   SAB IAB Ectopic Molar Multiple Live Births   2 0 0 0 0 2      # Outcome Date GA Lbr Kyle/2nd Weight Sex Type Anes PTL Lv   5 Current            4 SAB 2023 8w0d             Birth Comments: D&C   3 SAB 2022              Birth Comments: D&C   2 Term 20 38w4d / 00:43 3630 g (8 lb) M Vag-Spont EPI N BOBBY      Name: Kwan      Apgar1: 8  Apgar5: 9   1 Term 17 38w3d / 00:22 3280 g (7 lb 3.7 oz) F Vag-Spont EPI N BOBBY      Name: Rain      Apgar1: 9  Apgar5: 9      Obstetric Comments   FOB #1 : Pregnancy #1-2   2017-Rain   2020 - [place]       Past Medical History: Past Medical History:   Diagnosis Date    Anxiety and depression     stopped med in     Chlamydia       Past Surgical History Past Surgical History:   Procedure Laterality Date    APPENDECTOMY  2005    BREAST AUGMENTATION Bilateral     WISDOM TOOTH EXTRACTION        Family History: Family History   Problem Relation Age of Onset    Hypertension Mother     Hypertension Father     Heart disease Maternal Grandmother     Diabetes Maternal Grandmother     Heart disease Maternal Grandfather     Hypertension Paternal Grandmother     Hypertension Paternal Grandfather     Mental illness Other       Social History:  reports that she has never smoked. She has never used smokeless tobacco.   reports that she does not currently use  alcohol.   reports no history of drug use.        Review of Systems   Respiratory:  Negative for chest tightness and shortness of breath.    Cardiovascular:  Negative for chest pain.   Genitourinary:  Negative for difficulty urinating, dysuria, frequency, vaginal bleeding, vaginal discharge and vaginal pain.   Neurological:  Negative for headaches.         Objective     Vital Signs Range for the last 24 hours  Temperature: Temp:  [98.6 °F (37 °C)] 98.6 °F (37 °C)   Temp Source: Temp src: Oral   BP: BP: (115)/(70) 115/70   Pulse: Heart Rate:  [105-123] 113   Respirations:     SPO2: SpO2:  [96 %-97 %] 96 %   O2 Amount (l/min):     O2 Devices     Weight:       Physical Examination:   General appearance - alert, well appearing, and in no distress  Eyes -extraocular muscles intact  Chest -nonlabored breathing  Heart -regular rate  Extremities -pedal edema trace     Presentation: VTX   Cervix Exam by:  Alina Genao MD   Dilation: Cervical Dilation (cm): 2   Effacement: Cervical Effacement: 50   Station:  -2     Fetal Heart Rate Assessment   Method: Fetal HR Assessment Method: external   Beats/min: Fetal HR (beats/min): 135   Baseline: Fetal HR Baseline: normal range   Variability: Fetal HR Variability: moderate (amplitude range 6 to 25 bpm)   Accels: Fetal HR Accelerations: greater than/equal to 15 bpm, lasting at least 15 seconds   Decels: Fetal HR Decelerations: absent   Tracing Category:       Uterine Assessment   Method: Method: external tocotransducer   Frequency (min): Contraction Frequency (Minutes): x2   Ctx Count in 10 min:     Duration:     Intensity: Contraction Intensity: no contractions   Intensity by IUPC:     Resting Tone: Uterine Resting Tone: soft by palpation   Resting Tone by IUPC:     [place] Units:         Assessment & Plan        uterine contractions, antepartum, third trimester    Admit to labor and delivery for observation   Routine observation orders  p.o. hydration  Continues external  fetal monitoring/toco  Repeat cervical exam in the morning  Discussed patient with Dr. Rolo Genao MD  3/5/2025  02:15 EST

## 2025-03-05 NOTE — DISCHARGE SUMMARY
Admission date: 3/5/2025  Discharge date: 25    Referring Provider: Tristian Liu MD    Admission diagnosis:   uterine contractions, antepartum, third trimester [O47.03]       uterine contractions, antepartum, third trimester       Discharge diagnosis:   contractions  False labor    Consultants:  None    Hospital course:  The patient was admitted overnight due to  contractions. She she was diagnosed with false labor this morning and discharged home with precautions.       Vitals:    25 0709   BP: 115/73   Pulse: 96   Resp: 15   Temp:    SpO2:        GENERAL:  Well-developed, well-nourished in no acute distress.   ABD:  Gravid  NST:  Reactive  SVE:  FHT's  EXTREMITIES:  No clubbing, cyanosis or edema.  PSYCHIATRIC:  Normal affect and mood.      Discharge condition: stable  Discharge diet   Dietary Orders (From admission, onward)       Start     Ordered    25  Diet: Regular/House; Fluid Consistency: Thin (IDDSI 0)  Diet Effective Now        References:    Diet Order Definitions   Question Answer Comment   Diets: Regular/House    Fluid Consistency: Thin (IDDSI 0)        25                  Additional Instructions: Call with fevers, uncontrolled nausea/vomiting/pain.  Medications:      Discharge Medications        Continue These Medications        Instructions Start Date   ferrous sulfate 325 (65 FE) MG tablet   325 mg, Oral, Daily With Breakfast      FLUoxetine 20 MG capsule  Commonly known as: PROzac   20 mg, Oral, Daily      ondansetron ODT 4 MG disintegrating tablet  Commonly known as: ZOFRAN-ODT   4 mg, Oral, Every 8 Hours PRN      prenatal vitamin 27-0.8 27-0.8 MG tablet tablet   1 tablet, Daily      promethazine 12.5 MG tablet  Commonly known as: PHENERGAN   12.5 mg, Oral, Every 6 Hours PRN             Disposition:Home or Self Care  Follow up:   Future Appointments   Date Time Provider Department Center   3/10/2025  9:10 AM Tristian Liu  MD [facility]   3/31/2025  9:30 PM [facility] LD INDUCTION ROOM 1 [facility]       Over 30 minutes on discharge.  Counseling and coordinating care.    Tristian Liu MD

## 2025-03-05 NOTE — NURSING NOTE
Pt discharged home per MD order. RN instructed pt to return to labor leyva if any of the following occur: decreased fetal movement, vaginal bleeding, leaking of fluid. Pt verbalized understanding. Pt ambulating off unit in stable condition with all personal belongings.

## 2025-03-05 NOTE — DISCHARGE INSTRUCTIONS
Return to labor [facility]yva if any of the following occur: decreased fetal movement, vaginal bleeding, leaking of fluid

## 2025-03-05 NOTE — H&P
"[facility] [place]  Obstetric History and Physical    Referring Provider: Tristian Liu MD      Chief Complaint   Patient presents with     contractions       Subjective     Patient is a 30 y.o. female  currently at 35w3d, who presents with regular  contractions. She was admitted overnight observation. This morning she denies complaints and contractions have resolved. No cervical change overnight.    Her prenatal care is complicated by  contractions.  Her previous obstetric/gynecological history is noted for is remarkable for prior term vaginal deliveries.    The following portions of the patients history were reviewed and updated as appropriate: current medications, allergies, past medical history, past surgical history, past family history, past social history, and problem list .       Prenatal Information:   Maternal Prenatal Labs  Blood Type No results found for: \"ABO\"   Rh Status No results found for: \"RH\"   Antibody Screen No results found for: \"ABSCRN\"   Gonnorhea No results found for: \"GCCX\"   Chlamydia No results found for: \"CLAMYDCU\"   RPR No results found for: \"RPR\"   Syphilis Antibody No results found for: \"SYPHILIS\"   Rubella No results found for: \"RUBELLAIGGIN\"   Hepatitis B Surface Antigen No results found for: \"HEPBSAG\"   HIV-1 Antibody No results found for: \"LABHIV1\"   Hepatitis C Antibody No results found for: \"HEPCAB\"   Rapid [place]rin Drug Screen No results found for: \"AMPMETHU\", \"BARBITSCNUR\", \"LABBENZSCN\", \"LABMETHSCN\", \"LABOPIASCN\", \"THCURSCR\", \"COCAINEUR\", \"AMPHETSCREEN\", \"PROPOXSCN\", \"BUPRENORSCNU\", \"METAMPSCNUR\", \"OXYCODONESCN\", \"TRICYCLICSCN\"   Group B Strep Culture No results found for: \"GBSANTIGEN\"           External Prenatal Results       Pregnancy Outside Results - Transcribed From Office Records - See Scanned Records For Details       Test Value Date Time    ABO  AB  25    Rh  Positive  25    Antibody Screen  Negative  25 180       " Negative  01/20/25        Negative  08/12/24 1046    Varicella IgG       Rubella  2.11 index 08/12/24 1046    Hgb  9.6 g/dL 02/20/25 1808       11.1 g/dL 01/20/25        13.1 g/dL 08/12/24 1046    Hct  28.5 % 02/20/25 1808       32.3 % 01/20/25        40.6 % 08/12/24 1046    HgB A1c        1h GTT  91 mg/dL 01/20/25     3h GTT Fasting       3h GTT 1 hour       3h GTT 2 hour       3h GTT 3 hour        Gonorrhea (discrete)  Negative  08/12/24 1100    Chlamydia (discrete)  Negative  08/12/24 1100    RPR  Non Reactive  01/20/25        Non Reactive  08/12/24 1046    Syphils cascade: TP-Ab (FTA)  Non-Reactive  02/20/25 1820    TP-Ab  Non-Reactive  02/20/25 1820    TP-Ab (EIA)       TPPA       HBsAg  Negative  08/12/24 1046    Herpes Simplex Virus PCR       Herpes Simplex VIrus Culture       HIV  Non Reactive  08/12/24 1046    Hep C RNA Quant PCR       Hep C Antibody  Non Reactive  08/12/24 1046    AFP       NIPT       Cystic Fibrosis (Nik)       Cystic Fibroisis        Spinal Muscular atrophy       Fragile X       Group B Strep       GBS Susceptibility to Clindamycin       GBS Susceptibility to Erythromycin       Fetal Fibronectin       Genetic Testing, Maternal Blood                 Drug Screening       Test Value Date Time    Urine Drug Screen       Amphetamine Screen  Negative ng/mL 09/23/24 1022       Negative ng/mL 08/12/24 1100    Barbiturate Screen  Negative ng/mL 09/23/24 1022       Negative ng/mL 08/12/24 1100    Benzodiazepine Screen  Negative ng/mL 09/23/24 1022       Negative ng/mL 08/12/24 1100    Methadone Screen  Negative ng/mL 09/23/24 1022       Negative ng/mL 08/12/24 1100    Phencyclidine Screen  Negative ng/mL 09/23/24 1022       Negative ng/mL 08/12/24 1100    Opiates Screen       THC Screen       Cocaine Screen       Propoxyphene Screen  Negative ng/mL 09/23/24 1022       Negative ng/mL 08/12/24 1100    Buprenorphine Screen       Methamphetamine Screen       Oxycodone Screen       Tricyclic  Antidepressants Screen                 Legend    ^: Historical                              Past OB History:       OB History    Para Term  AB Living   5 2 2 0 2 2   SAB IAB Ectopic Molar Multiple Live Births   2 0 0 0 0 2      # Outcome Date GA Lbr Kyle/2nd Weight Sex Type Anes PTL Lv   5 Current            4 SAB 2023 8w0d             Birth Comments: D&C   3 SAB 2022              Birth Comments: D&C   2 Term 20 38w4d / 00:43 3630 g (8 lb) M Vag-Spont EPI N BOBBY      Name: Kwan      Apgar1: 8  Apgar5: 9   1 Term 17 38w3d / 00:22 3280 g (7 lb 3.7 oz) F Vag-Spont EPI N BOBBY      Name: Rain      Apgar1: 9  Apgar5: 9      Obstetric Comments   FOB #1 : Pregnancy #1-2   2017-Rain   2020 - [place]       Past Medical History: Past Medical History:   Diagnosis Date    Anxiety and depression     stopped med in     Chlamydia       Past Surgical History Past Surgical History:   Procedure Laterality Date    APPENDECTOMY  2005    BREAST AUGMENTATION Bilateral     WISDOM TOOTH EXTRACTION        Family History: Family History   Problem Relation Age of Onset    Hypertension Mother     Hypertension Father     Heart disease Maternal Grandmother     Diabetes Maternal Grandmother     Heart disease Maternal Grandfather     Hypertension Paternal Grandmother     Hypertension Paternal Grandfather     Mental illness Other       Social History:  reports that she has never smoked. She has never used smokeless tobacco.   reports that she does not currently use alcohol.   reports no history of drug use.                   General ROS Negative Findings:Headaches, Visual Changes, Epigastric pain, Anorexia, Nausia/Vomiting, ROM, and Vaginal Bleeding    ROS     All other systems have been reviewed and are neg  Objective       Vital Signs Range for the last 24 hours  Temperature: Temp:  [98.2 °F (36.8 °C)-98.6 °F (37 °C)] 98.2 °F (36.8 °C)   Temp Source: Temp src: Oral   BP: BP: (111-115)/(69-73) 115/73    Pulse: Heart Rate:  [] 96   Respirations: Resp:  [15-16] 15   SPO2: SpO2:  [96 %-99 %] 99 %   O2 Amount (l/min):     O2 Devices     Weight:       Physical Examination:   General:   alert, appears stated age, and cooperative   Skin:   normal   HEENT:     Lungs:   clear to auscultation bilaterally   Heart:   regular rate and rhythm, S1, S2 normal, no murmur, click, rub or gallop   Abdomen:  soft, non-tender; bowel sounds normal; no masses,  no organomegaly   Lower Extremities    Pelvis:  Vulva and vagina appear normal. Bimanual exam reveals normal uterus and adnexa.         Presentation: vertex   Cervix: Exam by: Method: sterile vaginal exam performed (per Dr. Liu)   Dilation:  2cm   Effacement: Cervical Effacement: 50   Station:  -2       Fetal Heart Rate Assessment   Method: Fetal HR Assessment Method: external   Beats/min: Fetal HR (beats/min): 130   Baseline: Fetal HR Baseline: normal range   Varibility: Fetal HR Variability: moderate (amplitude range 6 to 25 bpm)   Accels: Fetal HR Accelerations: greater than/equal to 15 bpm, lasting at least 15 seconds   Decels: Fetal HR Decelerations: absent   Tracing Category:       Uterine Assessment   Method: Method: external tocotransducer   Frequency (min): Contraction Frequency (Minutes): x1   Ctx Count in 10 min:     Duration:     Intensity: Contraction Intensity: no contractions   Intensity by IUPC:     Resting Tone: Uterine Resting Tone: soft by palpation   Resting Tone by IUPC:     [place] Units:       Laboratory Results:   Lab Results (last 24 hours)       Procedure Component Value Units Date/Time    Urinalysis With Microscopic If Indicated (No Culture) - Urine, Clean Catch [[id number]]  (Abnormal) Collected: 03/05/25 0113    Specimen: Urine, Clean Catch Updated: 03/05/25 0200     Color, UA Yellow     Appearance, UA Clear     pH, UA 7.5     Specific Gravity, UA 1.017     Glucose, UA Negative     Ketones, UA 15 mg/dL (1+)     Bilirubin, UA Negative      Blood, UA Negative     Protein, UA Negative     Leuk Esterase, UA Negative     Nitrite, UA Negative     Urobilinogen, UA 1.0 E.U./dL    Narrative:      Urine microscopic not indicated.          Radiology Review:   Imaging Results (Last 24 Hours)       ** No results found for the last 24 hours. **          Other Studies:    Assessment & Plan        uterine contractions, antepartum, third trimester        Assessment:  1.  Intrauterine pregnancy at 35w3d weeks gestation with reactive fetal status.    2.  False labor      Plan:  1. Reviewed precautions and will DC home. She has appointment with me next Monday.       Tristian Liu MD  3/5/2025  08:37 EST

## 2025-03-10 ENCOUNTER — ROUTINE PRENATAL (OUTPATIENT)
Dept: OBSTETRICS AND GYNECOLOGY | Facility: CLINIC | Age: 30
End: 2025-03-10
Payer: COMMERCIAL

## 2025-03-10 VITALS — BODY MASS INDEX: 29.45 KG/M2 | DIASTOLIC BLOOD PRESSURE: 76 MMHG | WEIGHT: 177 LBS | SYSTOLIC BLOOD PRESSURE: 92 MMHG

## 2025-03-10 DIAGNOSIS — O60.03 PRETERM LABOR IN THIRD TRIMESTER WITHOUT DELIVERY: ICD-10-CM

## 2025-03-10 DIAGNOSIS — Z3A.36 36 WEEKS GESTATION OF PREGNANCY: ICD-10-CM

## 2025-03-10 DIAGNOSIS — R10.2 PELVIC PAIN DURING PREGNANCY IN SECOND TRIMESTER, ANTEPARTUM: ICD-10-CM

## 2025-03-10 DIAGNOSIS — O26.892 PELVIC PAIN DURING PREGNANCY IN SECOND TRIMESTER, ANTEPARTUM: ICD-10-CM

## 2025-03-10 DIAGNOSIS — Z11.2 SCREENING FOR STREPTOCOCCAL INFECTION: Primary | ICD-10-CM

## 2025-03-10 LAB
GLUCOSE UR STRIP-MCNC: NEGATIVE MG/DL
PROT UR STRIP-MCNC: ABNORMAL MG/DL

## 2025-03-10 RX ORDER — ONDANSETRON 4 MG/1
4 TABLET, ORALLY DISINTEGRATING ORAL EVERY 8 HOURS PRN
Qty: 30 TABLET | Refills: 0 | Status: SHIPPED | OUTPATIENT
Start: 2025-03-10 | End: 2025-03-16

## 2025-03-10 NOTE — PROGRESS NOTES
OB FOLLOW UP  CC- Here for care of pregnancy        Glen Bey is a 30 y.o.  36w1d patient being seen today for her obstetrical follow up visit. Patient reports no complaints.     Her prenatal care is complicated by (and status) : see below.  Patient Active Problem List   Diagnosis    Depression with anxiety    Attention deficit hyperactivity disorder (ADHD), predominantly inattentive type    Pelvic pain during pregnancy in second trimester, antepartum     labor in third trimester without delivery     uterine contractions, antepartum, third trimester       GBS Status: Done Today. She is not allergic to PCN.    No Known Allergies       Flu Status: Declines  RSV status: declines.   Her Delivery Plan is: Desires IOL at 39wks. Scheduled    US today: no  Non Stress Test: No.    ROS -   Patient Denies: Loss of Fluid, Vaginal Spotting, Vision Changes, Headaches, Nausea , Vomiting , Epigastric pain, and skin itching  Fetal Movement : normal  Other than what is documented in the HPI, all other systems reviewed and are negative.       The additional following portions of the patient's history were reviewed and updated as appropriate: allergies and current medications.    I have reviewed and agree with the HPI, ROS, and historical information as entered above. Tristian Liu MD        EXAM:     Prenatal Vitals  BP: 92/76  Weight: 80.3 kg (177 lb)                  Urine Glucose Read-only: Negative  Urine Protein Read-only: (!) Trace           Assessment and Plan    Problem List Items Addressed This Visit       Pelvic pain during pregnancy in second trimester, antepartum     labor in third trimester without delivery     Other Visit Diagnoses         Screening for streptococcal infection    -  Primary    Relevant Orders    Group B Streptococcus Culture - Swab, Vaginal/Rectum      36 weeks gestation of pregnancy        Relevant Orders    POC Urinalysis Dipstick (Completed)             Pregnancy at 36w1d  Fetal status reassuring.   Lab(s) Ordered  Reviewed Pre-eclampsia signs/symptoms  Discussed IOL options with patient. Pt. desires IOL at 39 weeks.   Delivery options reviewed with patient  Signs of labor reviewed  Kick counts reviewed  Activity and Exercise discussed.  Return in about 1 week (around 3/17/2025) for Routine prenatal care.    Tristian Liu MD  03/10/2025

## 2025-03-13 ENCOUNTER — HOSPITAL ENCOUNTER (INPATIENT)
Facility: HOSPITAL | Age: 30
LOS: 3 days | Discharge: HOME | End: 2025-03-16
Attending: OBSTETRICS & GYNECOLOGY | Admitting: OBSTETRICS & GYNECOLOGY
Payer: COMMERCIAL

## 2025-03-13 PROBLEM — O36.8390 VARIABLE FETAL HEART RATE DECELERATIONS, ANTEPARTUM: Status: ACTIVE | Noted: 2025-03-13

## 2025-03-13 LAB
ABO GROUP BLD: NORMAL
ALBUMIN SERPL-MCNC: 3.5 G/DL (ref 3.5–5.2)
ALBUMIN/GLOB SERPL: 1.6 G/DL
ALP SERPL-CCNC: 85 U/L (ref 39–117)
ALT SERPL W P-5'-P-CCNC: 8 U/L (ref 1–33)
ANION GAP SERPL CALCULATED.3IONS-SCNC: 15 MMOL/L (ref 5–15)
AST SERPL-CCNC: 11 U/L (ref 1–32)
BILIRUB SERPL-MCNC: 0.2 MG/DL (ref 0–1.2)
BLD GP AB SCN SERPL QL: NEGATIVE
BUN SERPL-MCNC: 6 MG/DL (ref 6–20)
BUN/CREAT SERPL: 17.6 (ref 7–25)
CALCIUM SPEC-SCNC: 8.8 MG/DL (ref 8.6–10.5)
CHLORIDE SERPL-SCNC: 103 MMOL/L (ref 98–107)
CO2 SERPL-SCNC: 19 MMOL/L (ref 22–29)
CREAT SERPL-MCNC: 0.34 MG/DL (ref 0.57–1)
DEPRECATED RDW RBC AUTO: 39.8 FL (ref 37–54)
EGFRCR SERPLBLD CKD-EPI 2021: 143.1 ML/MIN/1.73
ERYTHROCYTE [DISTWIDTH] IN BLOOD BY AUTOMATED COUNT: 13.2 % (ref 12.3–15.4)
GLOBULIN UR ELPH-MCNC: 2.2 GM/DL
GLUCOSE SERPL-MCNC: 106 MG/DL (ref 65–99)
HCT VFR BLD AUTO: 30.1 % (ref 34–46.6)
HGB BLD-MCNC: 10.3 G/DL (ref 12–15.9)
MCH RBC QN AUTO: 28.3 PG (ref 26.6–33)
MCHC RBC AUTO-ENTMCNC: 34.2 G/DL (ref 31.5–35.7)
MCV RBC AUTO: 82.7 FL (ref 79–97)
PLATELET # BLD AUTO: 339 10*3/MM3 (ref 140–450)
PMV BLD AUTO: 10 FL (ref 6–12)
POTASSIUM SERPL-SCNC: 3.4 MMOL/L (ref 3.5–5.2)
PROT SERPL-MCNC: 5.7 G/DL (ref 6–8.5)
RBC # BLD AUTO: 3.64 10*6/MM3 (ref 3.77–5.28)
RH BLD: POSITIVE
SODIUM SERPL-SCNC: 137 MMOL/L (ref 136–145)
T&S EXPIRATION DATE: NORMAL
WBC NRBC COR # BLD AUTO: 11.16 10*3/MM3 (ref 3.4–10.8)

## 2025-03-13 PROCEDURE — G0378 HOSPITAL OBSERVATION PER HR: HCPCS

## 2025-03-13 PROCEDURE — 59025 FETAL NON-STRESS TEST: CPT

## 2025-03-13 PROCEDURE — 36415 COLL VENOUS BLD VENIPUNCTURE: CPT | Performed by: OBSTETRICS & GYNECOLOGY

## 2025-03-13 PROCEDURE — 86901 BLOOD TYPING SEROLOGIC RH(D): CPT | Performed by: OBSTETRICS & GYNECOLOGY

## 2025-03-13 PROCEDURE — 80053 COMPREHEN METABOLIC PANEL: CPT | Performed by: OBSTETRICS & GYNECOLOGY

## 2025-03-13 PROCEDURE — 85027 COMPLETE CBC AUTOMATED: CPT | Performed by: OBSTETRICS & GYNECOLOGY

## 2025-03-13 PROCEDURE — 86780 TREPONEMA PALLIDUM: CPT | Performed by: OBSTETRICS & GYNECOLOGY

## 2025-03-13 PROCEDURE — 86900 BLOOD TYPING SEROLOGIC ABO: CPT | Performed by: OBSTETRICS & GYNECOLOGY

## 2025-03-13 PROCEDURE — 99221 1ST HOSP IP/OBS SF/LOW 40: CPT | Performed by: OBSTETRICS & GYNECOLOGY

## 2025-03-13 PROCEDURE — 86850 RBC ANTIBODY SCREEN: CPT | Performed by: OBSTETRICS & GYNECOLOGY

## 2025-03-13 RX ORDER — LIDOCAINE HYDROCHLORIDE 10 MG/ML
0.5 INJECTION, SOLUTION EPIDURAL; INFILTRATION; INTRACAUDAL; PERINEURAL ONCE AS NEEDED
Status: DISCONTINUED | OUTPATIENT
Start: 2025-03-13 | End: 2025-03-14 | Stop reason: HOSPADM

## 2025-03-13 RX ORDER — ACETAMINOPHEN 325 MG/1
650 TABLET ORAL EVERY 6 HOURS PRN
Status: DISCONTINUED | OUTPATIENT
Start: 2025-03-13 | End: 2025-03-14 | Stop reason: SDUPTHER

## 2025-03-13 RX ORDER — SODIUM CHLORIDE, SODIUM LACTATE, POTASSIUM CHLORIDE, CALCIUM CHLORIDE 600; 310; 30; 20 MG/100ML; MG/100ML; MG/100ML; MG/100ML
125 INJECTION, SOLUTION INTRAVENOUS CONTINUOUS
Status: ACTIVE | OUTPATIENT
Start: 2025-03-13 | End: 2025-03-15

## 2025-03-13 RX ORDER — SODIUM CHLORIDE 9 MG/ML
40 INJECTION, SOLUTION INTRAVENOUS AS NEEDED
Status: DISCONTINUED | OUTPATIENT
Start: 2025-03-13 | End: 2025-03-14 | Stop reason: HOSPADM

## 2025-03-13 RX ORDER — ACETAMINOPHEN 500 MG
500 TABLET ORAL EVERY 6 HOURS PRN
COMMUNITY
End: 2025-03-16

## 2025-03-13 RX ORDER — BUTORPHANOL TARTRATE 2 MG/ML
2 INJECTION, SOLUTION INTRAMUSCULAR; INTRAVENOUS
Status: DISCONTINUED | OUTPATIENT
Start: 2025-03-13 | End: 2025-03-14 | Stop reason: SDUPTHER

## 2025-03-13 RX ORDER — BUTORPHANOL TARTRATE 1 MG/ML
1 INJECTION, SOLUTION INTRAMUSCULAR; INTRAVENOUS
Status: DISCONTINUED | OUTPATIENT
Start: 2025-03-13 | End: 2025-03-16 | Stop reason: HOSPADM

## 2025-03-13 RX ORDER — SODIUM CHLORIDE 0.9 % (FLUSH) 0.9 %
10 SYRINGE (ML) INJECTION AS NEEDED
Status: DISCONTINUED | OUTPATIENT
Start: 2025-03-13 | End: 2025-03-14 | Stop reason: HOSPADM

## 2025-03-13 RX ORDER — SODIUM CHLORIDE 0.9 % (FLUSH) 0.9 %
10 SYRINGE (ML) INJECTION EVERY 12 HOURS SCHEDULED
Status: DISCONTINUED | OUTPATIENT
Start: 2025-03-13 | End: 2025-03-14 | Stop reason: HOSPADM

## 2025-03-13 RX ADMIN — ACETAMINOPHEN 650 MG: 325 TABLET, FILM COATED ORAL at 21:22

## 2025-03-13 NOTE — H&P
"[facility]  Obstetric History and Physical    Chief Complaint   Patient presents with    Contractions    Back Pain       HPI:      Patient is a 30 y.o. female  currently at 36w4d, who presents with contractions and lower back pain.  She has been campbell most all afternoon.   She was noted to be 3-4 in the office.  She was unchanged with the initial nurse check and still unchanged after 1 1 /2 hours and my check.   That said she is having variable decels on the fetal monitor.   She denies vaginal leaking or bleeding.   +FM.          The following portions of the patients history were reviewed and updated as appropriate: current medications, allergies, past medical history, past surgical history, past family history, past social history and problem list .       Prenatal Information:   Maternal Prenatal Labs  Blood Type ABO Type   Date Value Ref Range Status   2025 AB  Final      Rh Status RH type   Date Value Ref Range Status   2025 Positive  Final      Antibody Screen Antibody Screen   Date Value Ref Range Status   2025 Negative  Final      Gonnorhea No results found for: \"GCCX\"   Chlamydia No results found for: \"CLAMYDCU\"   RPR No results found for: \"RPR\"   Syphilis Antibody No results found for: \"SYPHILIS\"   Rubella No results found for: \"RUBELLAIGGIN\"   Hepatitis B Surface Antigen No results found for: \"HEPBSAG\"   HIV-1 Antibody No results found for: \"LABHIV1\"   Hepatitis C Antibody No results found for: \"HEPCAB\"   Rapid Urin Drug Screen No results found for: \"AMPMETHU\", \"BARBITSCNUR\", \"LABBENZSCN\", \"LABMETHSCN\", \"LABOPIASCN\", \"THCURSCR\", \"COCAINEUR\", \"AMPHETSCREEN\", \"PROPOXSCN\", \"BUPRENORSCNU\", \"METAMPSCNUR\", \"OXYCODONESCN\", \"TRICYCLICSCN\"   Group B Strep Culture No results found for: \"GBSANTIGEN\"           External Prenatal Results       Pregnancy Outside Results - Transcribed From Office Records - See Scanned Records For Details       Test Value Date Time    ABO  AB  25 2764 "    Rh  Positive  03/13/25 1754    Antibody Screen  Negative  03/13/25 1754       Negative  02/20/25 1808       Negative  01/20/25        Negative  08/12/24 1046    Varicella IgG       Rubella  2.11 index 08/12/24 1046    Hgb  10.3 g/dL 03/13/25 1754       9.6 g/dL 02/20/25 1808       11.1 g/dL 01/20/25        13.1 g/dL 08/12/24 1046    Hct  30.1 % 03/13/25 1754       28.5 % 02/20/25 1808       32.3 % 01/20/25        40.6 % 08/12/24 1046    HgB A1c        1h GTT  91 mg/dL 01/20/25     3h GTT Fasting       3h GTT 1 hour       3h GTT 2 hour       3h GTT 3 hour        Gonorrhea (discrete)  Negative  08/12/24 1100    Chlamydia (discrete)  Negative  08/12/24 1100    RPR  Non Reactive  01/20/25        Non Reactive  08/12/24 1046    Syphils cascade: TP-Ab (FTA)  Non-Reactive  02/20/25 1820    TP-Ab  Non-Reactive  02/20/25 1820    TP-Ab (EIA)       TPPA       HBsAg  Negative  08/12/24 1046    Herpes Simplex Virus PCR       Herpes Simplex VIrus Culture       HIV  Non Reactive  08/12/24 1046    Hep C RNA Quant PCR       Hep C Antibody  Non Reactive  08/12/24 1046    AFP       NIPT       Cystic Fibrosis (Nik)       Cystic Fibroisis        Spinal Muscular atrophy       Fragile X       Group B Strep       GBS Susceptibility to Clindamycin       GBS Susceptibility to Erythromycin       Fetal Fibronectin       Genetic Testing, Maternal Blood                 Drug Screening       Test Value Date Time    Urine Drug Screen       Amphetamine Screen  Negative ng/mL 09/23/24 1022       Negative ng/mL 08/12/24 1100    Barbiturate Screen  Negative ng/mL 09/23/24 1022       Negative ng/mL 08/12/24 1100    Benzodiazepine Screen  Negative ng/mL 09/23/24 1022       Negative ng/mL 08/12/24 1100    Methadone Screen  Negative ng/mL 09/23/24 1022       Negative ng/mL 08/12/24 1100    Phencyclidine Screen  Negative ng/mL 09/23/24 1022       Negative ng/mL 08/12/24 1100    Opiates Screen       THC Screen       Cocaine Screen       Propoxyphene  Screen  Negative ng/mL 24 1022       Negative ng/mL 24 1100    Buprenorphine Screen       Methamphetamine Screen       Oxycodone Screen       Tricyclic Antidepressants Screen                 Legend    ^: Historical                              Past OB History:     OB History    Para Term  AB Living   5 2 2 0 2 2   SAB IAB Ectopic Molar Multiple Live Births   2 0 0 0 0 2      # Outcome Date GA Lbr Kyle/2nd Weight Sex Type Anes PTL Lv   5 Current            4 SAB 2023 8w0d             Birth Comments: D&C   3 SAB 2022              Birth Comments: D&C   2 Term 20 38w4d / 00:43 3630 g (8 lb) M Vag-Spont EPI N BOBBY      Name: [place]      Apgar1: 8  Apgar5: 9   1 Term 17 38w3d / 00:22 3280 g (7 lb 3.7 oz) F Vag-Spont EPI N BOBBY      Name: [place]      Apgar1: 9  Apgar5: 9      Obstetric Comments   FOB #1 : Pregnancy #1-2   2017-Rain   2020 - [place]       Past Medical History: Past Medical History:   Diagnosis Date    Anxiety and depression     stopped med in 2019    Chlamydia 2017      Past Surgical History Past Surgical History:   Procedure Laterality Date    APPENDECTOMY  2005    BREAST AUGMENTATION Bilateral     WISDOM TOOTH EXTRACTION        Family History: Family History   Problem Relation Age of Onset    Hypertension Mother     Hypertension Father     Heart disease Maternal Grandmother     Diabetes Maternal Grandmother     Heart disease Maternal Grandfather     Hypertension Paternal Grandmother     Hypertension Paternal Grandfather     Mental illness Other       Social History:  reports that she has never smoked. She has never used smokeless tobacco.   reports that she does not currently use alcohol.   reports no history of drug use.              Objective     Vital Signs Range for the last 24 hours  Temperature:     Temp Source: Temp src: Oral   BP: BP: (109-126)/(72-83) 124/77   Pulse:  94   Respirations: Resp:  [16] 16   SPO2:     O2 Amount (l/min):     O2 Devices      Weight: Weight:  [80.3 kg (177 lb)] 80.3 kg (177 lb)     Physical Examination: General appearance - alert, uncomfortable with contractions  Chest - Breathing is unlaboured   Heart - Regular rate   Abdomen - soft, nontender, nondistended, gravid uterus with mild contractions   Extremities - pedal edema  +1    Presentation: Cephalic    Cervix: Exam by: Method: sterile vaginal exam performed   Dilation:  3   Effacement: Cervical Effacement: 50   Station:  -2     Fetal Heart Rate Assessment   Method: Fetal HR Assessment Method: external   Beats/min: Fetal HR (beats/min): 135   Baseline: Fetal HR Baseline: normal range   Varibility: Fetal HR Variability: moderate (amplitude range 6 to 25 bpm)   Accels: Fetal HR Accelerations: greater than/equal to 15 bpm, lasting at least 15 seconds   Decels: Fetal HR Decelerations: absent   Tracing Category:       Uterine Assessment   Method: Method: external tocotransducer   Frequency (min): Contraction Frequency (Minutes): 3-4   Ctx Count in 10 min:     Duration:     Intensity: Contraction Intensity: mild by palpation   Intensity by IUPC:     Resting Tone: Uterine Resting Tone: soft by palpation   Resting Tone by IUPC:                   Assessment/Plan  1. Intrauterine pregnancy at 36w4d gestation with variable decelerations present fetal status  2.  Contractions without cervical change or labour   3.  Variable decels present      Plan:  1.  Admit to L&D  2.  No evidence of labour, but is having variable decels   3.  Will discuss with primary OB provider whether to watch overnight and PDC                    scan am versus moving towards delivery    4.  Admit and observ.   If variable persist will proceed with induction.  If not, then   have PDC scan in am.         Raul Omer MD  3/13/2025  19:55 EDT

## 2025-03-14 ENCOUNTER — ANESTHESIA EVENT (OUTPATIENT)
Dept: LABOR AND DELIVERY | Facility: HOSPITAL | Age: 30
End: 2025-03-14
Payer: COMMERCIAL

## 2025-03-14 ENCOUNTER — ANESTHESIA (OUTPATIENT)
Dept: LABOR AND DELIVERY | Facility: HOSPITAL | Age: 30
End: 2025-03-14
Payer: COMMERCIAL

## 2025-03-14 ENCOUNTER — APPOINTMENT (OUTPATIENT)
Dept: WOMENS IMAGING | Facility: HOSPITAL | Age: 30
End: 2025-03-14
Payer: COMMERCIAL

## 2025-03-14 PROBLEM — Z34.93 THIRD TRIMESTER PREGNANCY: Status: ACTIVE | Noted: 2025-03-14

## 2025-03-14 LAB
ATMOSPHERIC PRESS: ABNORMAL MM[HG]
ATMOSPHERIC PRESS: ABNORMAL MM[HG]
B-HEM STREP SPEC QL CULT: NEGATIVE
BASE EXCESS BLDCOA CALC-SCNC: -4.1 MMOL/L (ref 0–2)
BASE EXCESS BLDCOV CALC-SCNC: -2.4 MMOL/L (ref 0–2)
BDY SITE: ABNORMAL
BDY SITE: ABNORMAL
BODY TEMPERATURE: 37
BODY TEMPERATURE: 37
CO2 BLDA-SCNC: 23.2 MMOL/L (ref 22–33)
CO2 BLDA-SCNC: 25.5 MMOL/L (ref 22–33)
EPAP: 0
EPAP: 0
HCO3 BLDCOA-SCNC: 23.9 MMOL/L (ref 16.9–20.5)
HCO3 BLDCOV-SCNC: 22 MMOL/L (ref 18.6–21.4)
HGB BLDA-MCNC: 15 G/DL (ref 14–18)
HGB BLDA-MCNC: 15.9 G/DL (ref 14–18)
INHALED O2 CONCENTRATION: 21 %
INHALED O2 CONCENTRATION: 21 %
IPAP: 0
IPAP: 0
MODALITY: ABNORMAL
MODALITY: ABNORMAL
NOTE: 0
PAW @ PEAK INSP FLOW SETTING VENT: 0 CMH2O
PAW @ PEAK INSP FLOW SETTING VENT: 0 CMH2O
PCO2 BLDCOA: 53.4 MMHG (ref 43.3–54.9)
PCO2 BLDCOV: 36.3 MM HG (ref 28–40)
PH BLDCOA: 7.26 PH UNITS (ref 7.22–7.3)
PH BLDCOV: 7.39 PH UNITS (ref 7.31–7.37)
PO2 BLDCOA: 16.3 MMHG (ref 11.5–43.3)
PO2 BLDCOV: 21.9 MM HG (ref 21–31)
SAO2 % BLDCOA: 27.5 %
SAO2 % BLDCOA: ABNORMAL %
SAO2 % BLDCOV: 52.9 %
TOTAL RATE: 0 BREATHS/MINUTE
TOTAL RATE: 0 BREATHS/MINUTE
TREPONEMA PALLIDUM IGG+IGM AB [PRESENCE] IN SERUM OR PLASMA BY IMMUNOASSAY: NORMAL
VENTILATOR MODE: ABNORMAL

## 2025-03-14 PROCEDURE — 59410 OBSTETRICAL CARE: CPT | Performed by: OBSTETRICS & GYNECOLOGY

## 2025-03-14 PROCEDURE — 25010000002 ROPIVACAINE PER 1 MG: Performed by: NURSE ANESTHETIST, CERTIFIED REGISTERED

## 2025-03-14 PROCEDURE — 76819 FETAL BIOPHYS PROFIL W/O NST: CPT

## 2025-03-14 PROCEDURE — 59025 FETAL NON-STRESS TEST: CPT

## 2025-03-14 PROCEDURE — C1755 CATHETER, INTRASPINAL: HCPCS | Performed by: ANESTHESIOLOGY

## 2025-03-14 PROCEDURE — 25010000002 ONDANSETRON PER 1 MG: Performed by: OBSTETRICS & GYNECOLOGY

## 2025-03-14 PROCEDURE — 82805 BLOOD GASES W/O2 SATURATION: CPT

## 2025-03-14 PROCEDURE — 25010000002 LIDOCAINE-EPINEPHRINE (PF) 2 %-1:200000 SOLUTION: Performed by: ANESTHESIOLOGY

## 2025-03-14 PROCEDURE — C1755 CATHETER, INTRASPINAL: HCPCS

## 2025-03-14 PROCEDURE — 25010000002 CHLOROPROCAINE HCL (PF) 3 % SOLUTION: Performed by: ANESTHESIOLOGY

## 2025-03-14 PROCEDURE — 25010000002 LIDOCAINE-EPINEPHRINE (PF) 1.5 %-1:200000 SOLUTION: Performed by: NURSE ANESTHETIST, CERTIFIED REGISTERED

## 2025-03-14 PROCEDURE — 51702 INSERT TEMP BLADDER CATH: CPT

## 2025-03-14 PROCEDURE — 76816 OB US FOLLOW-UP PER FETUS: CPT

## 2025-03-14 PROCEDURE — 25010000002 FENTANYL CITRATE (PF) 50 MCG/ML SOLUTION: Performed by: NURSE ANESTHETIST, CERTIFIED REGISTERED

## 2025-03-14 PROCEDURE — 76816 OB US FOLLOW-UP PER FETUS: CPT | Performed by: OBSTETRICS & GYNECOLOGY

## 2025-03-14 PROCEDURE — 76819 FETAL BIOPHYS PROFIL W/O NST: CPT | Performed by: OBSTETRICS & GYNECOLOGY

## 2025-03-14 PROCEDURE — 25010000002 BUTORPHANOL PER 1 MG: Performed by: OBSTETRICS & GYNECOLOGY

## 2025-03-14 PROCEDURE — 25010000002 PENICILLIN G POTASSIUM PER 600000 UNITS: Performed by: OBSTETRICS & GYNECOLOGY

## 2025-03-14 RX ORDER — SODIUM CHLORIDE 0.9 % (FLUSH) 0.9 %
1-10 SYRINGE (ML) INJECTION AS NEEDED
Status: DISCONTINUED | OUTPATIENT
Start: 2025-03-14 | End: 2025-03-16 | Stop reason: HOSPADM

## 2025-03-14 RX ORDER — METHYLERGONOVINE MALEATE 0.2 MG/ML
200 INJECTION INTRAVENOUS ONCE AS NEEDED
Status: DISCONTINUED | OUTPATIENT
Start: 2025-03-14 | End: 2025-03-16 | Stop reason: HOSPADM

## 2025-03-14 RX ORDER — FAMOTIDINE 10 MG/ML
20 INJECTION, SOLUTION INTRAVENOUS ONCE AS NEEDED
Status: DISCONTINUED | OUTPATIENT
Start: 2025-03-14 | End: 2025-03-14 | Stop reason: HOSPADM

## 2025-03-14 RX ORDER — LIDOCAINE HYDROCHLORIDE AND EPINEPHRINE 15; 5 MG/ML; UG/ML
INJECTION, SOLUTION EPIDURAL AS NEEDED
Status: DISCONTINUED | OUTPATIENT
Start: 2025-03-14 | End: 2025-03-14 | Stop reason: SURG

## 2025-03-14 RX ORDER — ACETAMINOPHEN 325 MG/1
650 TABLET ORAL EVERY 6 HOURS PRN
Status: DISCONTINUED | OUTPATIENT
Start: 2025-03-14 | End: 2025-03-16 | Stop reason: HOSPADM

## 2025-03-14 RX ORDER — HYDROCODONE BITARTRATE AND ACETAMINOPHEN 10; 325 MG/1; MG/1
1 TABLET ORAL EVERY 4 HOURS PRN
Status: DISCONTINUED | OUTPATIENT
Start: 2025-03-14 | End: 2025-03-16 | Stop reason: HOSPADM

## 2025-03-14 RX ORDER — DOCUSATE SODIUM 100 MG/1
100 CAPSULE, LIQUID FILLED ORAL 2 TIMES DAILY
Status: DISCONTINUED | OUTPATIENT
Start: 2025-03-14 | End: 2025-03-16 | Stop reason: HOSPADM

## 2025-03-14 RX ORDER — HYDROCORTISONE 25 MG/G
1 CREAM TOPICAL AS NEEDED
Status: DISCONTINUED | OUTPATIENT
Start: 2025-03-14 | End: 2025-03-16 | Stop reason: HOSPADM

## 2025-03-14 RX ORDER — METOCLOPRAMIDE HYDROCHLORIDE 5 MG/ML
10 INJECTION INTRAMUSCULAR; INTRAVENOUS ONCE AS NEEDED
Status: DISCONTINUED | OUTPATIENT
Start: 2025-03-14 | End: 2025-03-14 | Stop reason: HOSPADM

## 2025-03-14 RX ORDER — LIDOCAINE HCL/EPINEPHRINE/PF 2%-1:200K
VIAL (ML) INJECTION AS NEEDED
Status: DISCONTINUED | OUTPATIENT
Start: 2025-03-14 | End: 2025-03-14 | Stop reason: SURG

## 2025-03-14 RX ORDER — ACETAMINOPHEN 325 MG/1
650 TABLET ORAL EVERY 4 HOURS PRN
Status: DISCONTINUED | OUTPATIENT
Start: 2025-03-14 | End: 2025-03-16 | Stop reason: HOSPADM

## 2025-03-14 RX ORDER — ROPIVACAINE HYDROCHLORIDE 2 MG/ML
14 INJECTION, SOLUTION EPIDURAL; INFILTRATION; PERINEURAL CONTINUOUS
Status: DISCONTINUED | OUTPATIENT
Start: 2025-03-14 | End: 2025-03-16 | Stop reason: HOSPADM

## 2025-03-14 RX ORDER — OXYTOCIN/0.9 % SODIUM CHLORIDE 30/500 ML
250 PLASTIC BAG, INJECTION (ML) INTRAVENOUS CONTINUOUS
Status: ACTIVE | OUTPATIENT
Start: 2025-03-14 | End: 2025-03-14

## 2025-03-14 RX ORDER — SODIUM CHLORIDE, SODIUM LACTATE, POTASSIUM CHLORIDE, CALCIUM CHLORIDE 600; 310; 30; 20 MG/100ML; MG/100ML; MG/100ML; MG/100ML
125 INJECTION, SOLUTION INTRAVENOUS CONTINUOUS
Status: ACTIVE | OUTPATIENT
Start: 2025-03-14 | End: 2025-03-14

## 2025-03-14 RX ORDER — OXYTOCIN/0.9 % SODIUM CHLORIDE 30/500 ML
999 PLASTIC BAG, INJECTION (ML) INTRAVENOUS ONCE
Status: COMPLETED | OUTPATIENT
Start: 2025-03-14 | End: 2025-03-14

## 2025-03-14 RX ORDER — CARBOPROST TROMETHAMINE 250 UG/ML
250 INJECTION, SOLUTION INTRAMUSCULAR
Status: DISCONTINUED | OUTPATIENT
Start: 2025-03-14 | End: 2025-03-16 | Stop reason: HOSPADM

## 2025-03-14 RX ORDER — IBUPROFEN 600 MG/1
600 TABLET, FILM COATED ORAL EVERY 6 HOURS SCHEDULED
Status: DISCONTINUED | OUTPATIENT
Start: 2025-03-15 | End: 2025-03-16 | Stop reason: HOSPADM

## 2025-03-14 RX ORDER — BUTORPHANOL TARTRATE 2 MG/ML
2 INJECTION, SOLUTION INTRAMUSCULAR; INTRAVENOUS
Status: DISCONTINUED | OUTPATIENT
Start: 2025-03-14 | End: 2025-03-16 | Stop reason: HOSPADM

## 2025-03-14 RX ORDER — MISOPROSTOL 200 UG/1
800 TABLET ORAL ONCE AS NEEDED
Status: DISCONTINUED | OUTPATIENT
Start: 2025-03-14 | End: 2025-03-16 | Stop reason: HOSPADM

## 2025-03-14 RX ORDER — OXYTOCIN/0.9 % SODIUM CHLORIDE 30/500 ML
125 PLASTIC BAG, INJECTION (ML) INTRAVENOUS ONCE AS NEEDED
Status: DISCONTINUED | OUTPATIENT
Start: 2025-03-14 | End: 2025-03-16 | Stop reason: HOSPADM

## 2025-03-14 RX ORDER — ONDANSETRON 2 MG/ML
4 INJECTION INTRAMUSCULAR; INTRAVENOUS EVERY 6 HOURS PRN
Status: DISCONTINUED | OUTPATIENT
Start: 2025-03-14 | End: 2025-03-16 | Stop reason: HOSPADM

## 2025-03-14 RX ORDER — OXYTOCIN/0.9 % SODIUM CHLORIDE 30/500 ML
2-30 PLASTIC BAG, INJECTION (ML) INTRAVENOUS
Status: DISCONTINUED | OUTPATIENT
Start: 2025-03-14 | End: 2025-03-16 | Stop reason: HOSPADM

## 2025-03-14 RX ORDER — FENTANYL CITRATE 50 UG/ML
INJECTION, SOLUTION INTRAMUSCULAR; INTRAVENOUS AS NEEDED
Status: DISCONTINUED | OUTPATIENT
Start: 2025-03-14 | End: 2025-03-14 | Stop reason: SURG

## 2025-03-14 RX ORDER — CITRIC ACID/SODIUM CITRATE 334-500MG
30 SOLUTION, ORAL ORAL ONCE
Status: DISCONTINUED | OUTPATIENT
Start: 2025-03-14 | End: 2025-03-14 | Stop reason: HOSPADM

## 2025-03-14 RX ORDER — CHLOROPROCAINE HYDROCHLORIDE 30 MG/ML
INJECTION, SOLUTION EPIDURAL; INFILTRATION; INTRACAUDAL; PERINEURAL AS NEEDED
Status: DISCONTINUED | OUTPATIENT
Start: 2025-03-14 | End: 2025-03-14 | Stop reason: SURG

## 2025-03-14 RX ORDER — ONDANSETRON 2 MG/ML
4 INJECTION INTRAMUSCULAR; INTRAVENOUS ONCE AS NEEDED
Status: DISCONTINUED | OUTPATIENT
Start: 2025-03-14 | End: 2025-03-14 | Stop reason: HOSPADM

## 2025-03-14 RX ORDER — HYDROCODONE BITARTRATE AND ACETAMINOPHEN 5; 325 MG/1; MG/1
1 TABLET ORAL EVERY 8 HOURS PRN
Status: DISCONTINUED | OUTPATIENT
Start: 2025-03-14 | End: 2025-03-16 | Stop reason: HOSPADM

## 2025-03-14 RX ORDER — DIPHENHYDRAMINE HYDROCHLORIDE 50 MG/ML
12.5 INJECTION, SOLUTION INTRAMUSCULAR; INTRAVENOUS EVERY 8 HOURS PRN
Status: DISCONTINUED | OUTPATIENT
Start: 2025-03-14 | End: 2025-03-14 | Stop reason: HOSPADM

## 2025-03-14 RX ORDER — PENICILLIN G 3000000 [IU]/50ML
3 INJECTION, SOLUTION INTRAVENOUS EVERY 4 HOURS
Status: DISCONTINUED | OUTPATIENT
Start: 2025-03-14 | End: 2025-03-14

## 2025-03-14 RX ORDER — MAGNESIUM CARB/ALUMINUM HYDROX 105-160MG
30 TABLET,CHEWABLE ORAL ONCE
Status: DISCONTINUED | OUTPATIENT
Start: 2025-03-14 | End: 2025-03-16 | Stop reason: HOSPADM

## 2025-03-14 RX ORDER — BISACODYL 10 MG
10 SUPPOSITORY, RECTAL RECTAL DAILY PRN
Status: DISCONTINUED | OUTPATIENT
Start: 2025-03-15 | End: 2025-03-16 | Stop reason: HOSPADM

## 2025-03-14 RX ORDER — OXYTOCIN/0.9 % SODIUM CHLORIDE 30/500 ML
2-20 PLASTIC BAG, INJECTION (ML) INTRAVENOUS
Status: DISCONTINUED | OUTPATIENT
Start: 2025-03-14 | End: 2025-03-14

## 2025-03-14 RX ORDER — EPHEDRINE SULFATE 5 MG/ML
10 INJECTION INTRAVENOUS
Status: DISCONTINUED | OUTPATIENT
Start: 2025-03-14 | End: 2025-03-14 | Stop reason: HOSPADM

## 2025-03-14 RX ADMIN — SODIUM CHLORIDE 5 MILLION UNITS: 900 INJECTION INTRAVENOUS at 08:52

## 2025-03-14 RX ADMIN — BUTORPHANOL TARTRATE 2 MG: 2 INJECTION, SOLUTION INTRAMUSCULAR; INTRAVENOUS at 00:10

## 2025-03-14 RX ADMIN — Medication 999 ML/HR: at 19:13

## 2025-03-14 RX ADMIN — ROPIVACAINE HYDROCHLORIDE 10 ML: 5 INJECTION, SOLUTION EPIDURAL; INFILTRATION; PERINEURAL at 12:35

## 2025-03-14 RX ADMIN — PENICILLIN G 3 MILLION UNITS: 3000000 INJECTION, SOLUTION INTRAVENOUS at 14:01

## 2025-03-14 RX ADMIN — Medication 2 MILLI-UNITS/MIN: at 08:52

## 2025-03-14 RX ADMIN — FLUOXETINE HYDROCHLORIDE 20 MG: 20 CAPSULE ORAL at 07:52

## 2025-03-14 RX ADMIN — BUTORPHANOL TARTRATE 2 MG: 2 INJECTION, SOLUTION INTRAMUSCULAR; INTRAVENOUS at 05:07

## 2025-03-14 RX ADMIN — ONDANSETRON 4 MG: 2 INJECTION INTRAMUSCULAR; INTRAVENOUS at 00:12

## 2025-03-14 RX ADMIN — ACETAMINOPHEN 650 MG: 325 TABLET, FILM COATED ORAL at 07:51

## 2025-03-14 RX ADMIN — CHLOROPROCAINE HYDROCHLORIDE 5 ML: 30 INJECTION, SOLUTION EPIDURAL; INFILTRATION; INTRACAUDAL; PERINEURAL at 18:24

## 2025-03-14 RX ADMIN — DOCUSATE SODIUM 100 MG: 100 CAPSULE, LIQUID FILLED ORAL at 22:52

## 2025-03-14 RX ADMIN — LIDOCAINE HYDROCHLORIDE,EPINEPHRINE BITARTRATE 6 ML: 20; .005 INJECTION, SOLUTION EPIDURAL; INFILTRATION; INTRACAUDAL; PERINEURAL at 17:36

## 2025-03-14 RX ADMIN — PENICILLIN G 3 MILLION UNITS: 3000000 INJECTION, SOLUTION INTRAVENOUS at 17:35

## 2025-03-14 RX ADMIN — ROPIVACAINE HYDROCHLORIDE 14 ML/HR: 2 INJECTION, SOLUTION EPIDURAL; INFILTRATION at 12:37

## 2025-03-14 RX ADMIN — LIDOCAINE HYDROCHLORIDE,EPINEPHRINE BITARTRATE 5 ML: 20; .005 INJECTION, SOLUTION EPIDURAL; INFILTRATION; INTRACAUDAL; PERINEURAL at 18:24

## 2025-03-14 RX ADMIN — ACETAMINOPHEN 650 MG: 325 TABLET, FILM COATED ORAL at 22:52

## 2025-03-14 RX ADMIN — LIDOCAINE HYDROCHLORIDE AND EPINEPHRINE 3 ML: 15; 5 INJECTION, SOLUTION EPIDURAL at 12:30

## 2025-03-14 RX ADMIN — FENTANYL CITRATE 100 MCG: 50 INJECTION, SOLUTION INTRAMUSCULAR; INTRAVENOUS at 12:33

## 2025-03-14 RX ADMIN — BUTORPHANOL TARTRATE 2 MG: 2 INJECTION, SOLUTION INTRAMUSCULAR; INTRAVENOUS at 02:30

## 2025-03-14 NOTE — ANESTHESIA PREPROCEDURE EVALUATION
Anesthesia Evaluation     Patient summary reviewed and Nursing notes reviewed     NPO Liquid Status: > 4 hours           Airway   Dental      Pulmonary - negative pulmonary ROS   Cardiovascular - negative cardio ROS        Neuro/Psych- negative ROS  (+) psychiatric history Anxiety, Depression and ADHD  GI/Hepatic/Renal/Endo - negative ROS     Musculoskeletal (-) negative ROS    Abdominal    Substance History - negative use     OB/GYN negative ob/gyn ROS   (+) Pregnant        Other                    Anesthesia Plan    ASA 2     epidural       Anesthetic plan, risks, benefits, and alternatives have been provided, discussed and informed consent has been obtained with: patient.    CODE STATUS:    Code Status (Patient has no pulse and is not breathing): CPR (Attempt to Resuscitate)  Medical Interventions (Patient has pulse or is breathing): Full Support  Level Of Support Discussed With: Patient

## 2025-03-14 NOTE — PROGRESS NOTES
EFM reviewed overnight, and there are multiple late decels. She cont to have contractions. Will proceed with augmentation of labor given non reassuring fetal status. Gbs unknown, plan PCN. Pt in agreement of plan.

## 2025-03-14 NOTE — ANESTHESIA PROCEDURE NOTES
Labor Epidural      Patient reassessed immediately prior to procedure    Patient location during procedure: OB  Performed By  CRNA/CAA: Amanda Grajeda CRNA  Preanesthetic Checklist  Completed: patient identified, IV checked, risks and benefits discussed, surgical consent, monitors and equipment checked, pre-op evaluation and timeout performed  Prep:  Pt Position:sitting  Sterile Tech:cap, gloves, mask and sterile barrier  Prep:DuraPrep  Monitoring:blood pressure monitoring  Epidural Block Procedure:  Approach:midline  Guidance:palpation technique  Location:L3-L4  Needle Type:Tuohy  Needle Gauge:17 G  Loss of Resistance Medium: saline  Loss of Resistance: 7cm  Cath Depth at skin:13 cm  Paresthesia: none  Aspiration:negative  Test Dose:negative  Number of Attempts: 2  Post Assessment:  Dressing:occlusive dressing applied and secured with tape  Pt Tolerance:patient tolerated the procedure well with no apparent complications  Complications:no

## 2025-03-14 NOTE — L&D DELIVERY NOTE
3/14/2025    Patient:Glen Bey    MR#:[id number]    Vaginal Delivery Note  30 y.o. yo female  at 36w5d      Variable fetal heart rate decelerations, antepartum    Third trimester pregnancy       Delivery     Delivery: Vaginal, Spontaneous    YOB: 2024   Time of Birth: 7:10 PM     Anesthesia: Epidural    Delivering clinician: Maryana Díaz   Forceps?   No   Vacuum? No    Shoulder dystocia present: No          Infant    Findings: female infant     Infant observations: Weight: No birth weight on file.    Observations/Comments:        Apgars:   @ 1 minute /      @ 5 minutes         Placenta, Cord, and Fluid    Placenta delivered  Spontaneous at  3/14/2025  7:14 PM    Cord:   present.   Nuchal Cord?  yes; Number of nuchal loops present:      Cord blood obtained:     Cord gases obtained:      Cord gas results: Pending         Repair    Episiotomy: No   Lacerations: No   Estimated Blood Loss:   300 mls.         Complications  Placental abruption    Disposition  Mother to Mother Baby/Postpartum  in stable condition currently.  Baby to remains with mom  in stable condition currently.    Description of Delivery  Patient pushed well x 3 contractions and delivered easily from OA presentation once manually rotated.  There was bright red bloody fluid at delivery.  There was a nuchal x 1, reduced at perineum.  Shoulders delivered easily. Nose and mouth was bulb suctioned and baby was placed on mother's abdomen.  Cord was milked, clamped and cut.   Placenta delivered easily and with pitocin bolus, there was no uterine atony.   The placenta was inspected and at least 20% of it was dusky, consistent with abruption.  UDS ordered b/c of it.      Maryana Díaz MD  25  19:24 EDT

## 2025-03-14 NOTE — PROGRESS NOTES
Patient comfortable with epidural    FHT currently category 1  Ctx's not tracing well    3/60/2, AROM, clear fluid  IUPC placed    -expect       Maryana Díaz MD  25

## 2025-03-15 LAB
BASOPHILS # BLD AUTO: 0.06 10*3/MM3 (ref 0–0.2)
BASOPHILS NFR BLD AUTO: 0.5 % (ref 0–1.5)
DEPRECATED RDW RBC AUTO: 42.1 FL (ref 37–54)
EOSINOPHIL # BLD AUTO: 0.21 10*3/MM3 (ref 0–0.4)
EOSINOPHIL NFR BLD AUTO: 1.7 % (ref 0.3–6.2)
ERYTHROCYTE [DISTWIDTH] IN BLOOD BY AUTOMATED COUNT: 13.5 % (ref 12.3–15.4)
HCT VFR BLD AUTO: 26.8 % (ref 34–46.6)
HGB BLD-MCNC: 8.9 G/DL (ref 12–15.9)
IMM GRANULOCYTES # BLD AUTO: 0.06 10*3/MM3 (ref 0–0.05)
IMM GRANULOCYTES NFR BLD AUTO: 0.5 % (ref 0–0.5)
LYMPHOCYTES # BLD AUTO: 2.39 10*3/MM3 (ref 0.7–3.1)
LYMPHOCYTES NFR BLD AUTO: 19.2 % (ref 19.6–45.3)
MCH RBC QN AUTO: 28.6 PG (ref 26.6–33)
MCHC RBC AUTO-ENTMCNC: 33.2 G/DL (ref 31.5–35.7)
MCV RBC AUTO: 86.2 FL (ref 79–97)
MONOCYTES # BLD AUTO: 0.67 10*3/MM3 (ref 0.1–0.9)
MONOCYTES NFR BLD AUTO: 5.4 % (ref 5–12)
NEUTROPHILS NFR BLD AUTO: 72.7 % (ref 42.7–76)
NEUTROPHILS NFR BLD AUTO: 9.08 10*3/MM3 (ref 1.7–7)
NRBC BLD AUTO-RTO: 0 /100 WBC (ref 0–0.2)
PLATELET # BLD AUTO: 255 10*3/MM3 (ref 140–450)
PMV BLD AUTO: 10.1 FL (ref 6–12)
RBC # BLD AUTO: 3.11 10*6/MM3 (ref 3.77–5.28)
WBC NRBC COR # BLD AUTO: 12.47 10*3/MM3 (ref 3.4–10.8)

## 2025-03-15 PROCEDURE — 85025 COMPLETE CBC W/AUTO DIFF WBC: CPT | Performed by: OBSTETRICS & GYNECOLOGY

## 2025-03-15 PROCEDURE — 0503F POSTPARTUM CARE VISIT: CPT

## 2025-03-15 RX ORDER — FERROUS SULFATE 325(65) MG
325 TABLET ORAL
Status: DISCONTINUED | OUTPATIENT
Start: 2025-03-15 | End: 2025-03-16 | Stop reason: HOSPADM

## 2025-03-15 RX ADMIN — FERROUS SULFATE TAB 325 MG (65 MG ELEMENTAL FE) 325 MG: 325 (65 FE) TAB at 17:37

## 2025-03-15 RX ADMIN — IBUPROFEN 600 MG: 600 TABLET ORAL at 05:32

## 2025-03-15 RX ADMIN — IBUPROFEN 600 MG: 600 TABLET ORAL at 00:05

## 2025-03-15 RX ADMIN — DOCUSATE SODIUM 100 MG: 100 CAPSULE, LIQUID FILLED ORAL at 08:56

## 2025-03-15 RX ADMIN — IBUPROFEN 600 MG: 600 TABLET ORAL at 11:45

## 2025-03-15 RX ADMIN — IBUPROFEN 600 MG: 600 TABLET ORAL at 17:37

## 2025-03-15 RX ADMIN — Medication 1 APPLICATION: at 05:32

## 2025-03-15 NOTE — LACTATION NOTE
03/15/25 0839   Maternal Information   Date of Referral 03/15/25   Person Making Referral lactation consultant  (courtesy; newly postpartum)   Maternal Reason for Referral other (see comments)  (mother resting; LC will come back with hospital pump)

## 2025-03-15 NOTE — PROGRESS NOTES
Postpartum Progress Note    Patient name: Glen Bey  YOB: 1995   MRN: [id number]  Referring Provider: Trish Seth MD  Admission Date: 3/13/2025  Date of Service: 3/15/2025    ID: 30 y.o.     Diagnosis:   S/p vaginal delivery     Variable fetal heart rate decelerations, antepartum    Third trimester pregnancy       Subjective:      No complaints.  Moderate lochia.  Ambulating, voiding, tolerating diet.  Pain well controlled.  The patient is currently breastfeeding.   This baby is a female.    Objective:      Vital signs:  Vital Signs Range for the last 24 hours  Temperature: Temp:  [98 °F (36.7 °C)-99 °F (37.2 °C)] 98.9 °F (37.2 °C)   Temp Source: Temp src: Oral   BP: BP: ()/(50-90) 124/64   Pulse: Heart Rate:  [] 80   Respirations: Resp:  [16-18] 18   Weight: 80.3 kg (177 lb)     General: Alert & oriented x4, in no apparent distress  Abdomen: soft, nontender  Uterus: firm, nontender  Extremities: nontender; no edema      Labs:  Lab Results   Component Value Date    WBC 12.47 (H) 03/15/2025    HGB 8.9 (L) 03/15/2025    HCT 26.8 (L) 03/15/2025    MCV 86.2 03/15/2025     03/15/2025     Results from last 7 days   Lab Units 25  1754   ABO TYPING  AB   RH TYPING  Positive     External Prenatal Results       Pregnancy Outside Results - Transcribed From Office Records - See Scanned Records For Details       Test Value Date Time    ABO  AB  25 1754    Rh  Positive  25 1754    Antibody Screen  Negative  25 1754       Negative  25 1808       Negative  25        Negative  24 1046    Varicella IgG       Rubella  2.11 index 24 1046    Hgb  8.9 g/dL 03/15/25 0746       10.3 g/dL 25 1754       9.6 g/dL 25 1808       11.1 g/dL 25        13.1 g/dL 24 1046    Hct  26.8 % 03/15/25 0746       30.1 % 25 1754       28.5 % 25 1808       32.3 % 25        40.6 % 24 1046    HgB A1c        1h GTT   91 mg/dL 01/20/25     3h GTT Fasting       3h GTT 1 hour       3h GTT 2 hour       3h GTT 3 hour        Gonorrhea (discrete)  Negative  08/12/24 1100    Chlamydia (discrete)  Negative  08/12/24 1100    RPR  Non Reactive  01/20/25        Non Reactive  08/12/24 1046    Syphils cascade: TP-Ab (FTA)  Non-Reactive  03/13/25 1754       Non-Reactive  02/20/25 1820    TP-Ab  Non-Reactive  03/13/25 1754       Non-Reactive  02/20/25 1820    TP-Ab (EIA)       TPPA       HBsAg  Negative  08/12/24 1046    Herpes Simplex Virus PCR       Herpes Simplex VIrus Culture       HIV  Non Reactive  08/12/24 1046    Hep C RNA Quant PCR       Hep C Antibody  Non Reactive  08/12/24 1046    AFP       NIPT       Cystic Fibrosis (Nik)       Cystic Fibroisis        Spinal Muscular atrophy       Fragile X       Group B Strep  Negative  03/10/25 0930    GBS Susceptibility to Clindamycin       GBS Susceptibility to Erythromycin       Fetal Fibronectin       Genetic Testing, Maternal Blood                 Drug Screening       Test Value Date Time    Urine Drug Screen       Amphetamine Screen  Negative ng/mL 09/23/24 1022       Negative ng/mL 08/12/24 1100    Barbiturate Screen  Negative ng/mL 09/23/24 1022       Negative ng/mL 08/12/24 1100    Benzodiazepine Screen  Negative ng/mL 09/23/24 1022       Negative ng/mL 08/12/24 1100    Methadone Screen  Negative ng/mL 09/23/24 1022       Negative ng/mL 08/12/24 1100    Phencyclidine Screen  Negative ng/mL 09/23/24 1022       Negative ng/mL 08/12/24 1100    Opiates Screen       THC Screen       Cocaine Screen       Propoxyphene Screen  Negative ng/mL 09/23/24 1022       Negative ng/mL 08/12/24 1100    Buprenorphine Screen       Methamphetamine Screen       Oxycodone Screen       Tricyclic Antidepressants Screen                 Legend    ^: Historical                            Assessment/Plan:      PPD#1 s/p vaginal delivery complicated by placental abruption . Doing well.   Asymptomatic anemia. VSS.  EBL 300mL.     Plan:   Continue routine postpartum care. Advance.   Begin PO iron with constipation precautions.   AM CBC to f/u H/H.    Stephanie Bajwa, APRN  03/15/25

## 2025-03-15 NOTE — ANESTHESIA POSTPROCEDURE EVALUATION
Patient: Glen Bey    Procedure Summary       Date: 03/14/25 Room / Location:     Anesthesia Start: 1210 Anesthesia Stop: 1914    Procedure: LABOR ANALGESIA Diagnosis:     Scheduled Providers:  Provider: Sloane Hutchins DO    Anesthesia Type: epidural ASA Status: 2            Anesthesia Type: epidural    Vitals  Vitals Value Taken Time   /64 03/15/25 07:20   Temp 98.9 °F (37.2 °C) 03/15/25 07:20   Pulse 80 03/15/25 07:20   Resp 18 03/15/25 07:20   SpO2 98 % 03/14/25 12:37           Post Anesthesia Care and Evaluation    Patient location during evaluation: bedside  Patient participation: complete - patient participated  Level of consciousness: awake  Pain score: 0  Pain management: satisfactory to patient    Airway patency: patent  Anesthetic complications: No anesthetic complications  PONV Status: none  Cardiovascular status: acceptable and hemodynamically stable  Respiratory status: acceptable  Hydration status: acceptable  Post Neuraxial Block status: Motor and sensory function returned to baseline and No signs or symptoms of PDPH

## 2025-03-15 NOTE — PAYOR COMM NOTE
"  Notification of Delivery  ACC/Utilization Review  Phone: [phone]  Fax: [phone]    [facility]  [address] [place] [id number]      Lele Mora (30 y.o. Female)       Date of Birth   1995    Social Security Number       [place]   [address] [facility] [address]    Home Phone   [phone]    MRN   [id number]       Zoroastrian   None    Marital Status   Single                            Admission Date   3/13/2025    Admission Type   Elective    Admitting Provider   Trish Seth MD    Attending Provider   Tristian Liu MD    Department, Room/Bed   [facility]AB[facility] 4A, N405/1       Discharge Date       Discharge Disposition       Discharge Destination                                 Attending Provider: Tristian Liu MD    Allergies: No Known Allergies    Isolation: None   Infection: None   Code Status: CPR    Ht: 165.1 cm (65\")   Wt: 80.3 kg (177 lb)    Admission Cmt: None   Principal Problem: Variable fetal heart rate decelerations, antepartum [O36.8390]                   Active Insurance as of 3/13/2025       Primary Coverage       Payor Plan Insurance Group Employer/Plan Group    [place]        Payor Plan Address Payor Plan Phone Number Payor Plan Fax Number Effective Dates    [address]   1/1/2015 - None Entered    [facility] [id number]         Subscriber Name Subscriber Birth Date Member ID       LELE MORA 1995 [id number]                     Emergency Contacts        (Rel.) Home Phone Work Phone Mobile Phone    Lilly Harrison (Relative) [phone] -- --              Insurance Information                  [employer] Phone: --    Subscriber: Lele Mora Subscriber#: [id number]    Group#: -- Precert#: --    Authorization#: -- Effective Date: --             History & Physical        Raul Omer, " "MD at 25          [facility]  Obstetric History and Physical    Chief Complaint   Patient presents with    Contractions    Back Pain       HPI:      Patient is a 30 y.o. female  currently at 36w4d, who presents with contractions and lower back pain.  She has been campbell most all afternoon.   She was noted to be 3-4 in the office.  She was unchanged with the initial nurse check and still unchanged after 1 1 /2 hours and my check.   That said she is having variable decels on the fetal monitor.   She denies vaginal leaking or bleeding.   +FM.          The following portions of the patients history were reviewed and updated as appropriate: current medications, allergies, past medical history, past surgical history, past family history, past social history and problem list .       Prenatal Information:   Maternal Prenatal Labs  Blood Type ABO Type   Date Value Ref Range Status   2025 AB  Final      Rh Status RH type   Date Value Ref Range Status   2025 Positive  Final      Antibody Screen Antibody Screen   Date Value Ref Range Status   2025 Negative  Final      Gonnorhea No results found for: \"GCCX\"   Chlamydia No results found for: \"CLAMYDCU\"   RPR No results found for: \"RPR\"   Syphilis Antibody No results found for: \"SYPHILIS\"   Rubella No results found for: \"RUBELLAIGGIN\"   Hepatitis B Surface Antigen No results found for: \"HEPBSAG\"   HIV-1 Antibody No results found for: \"LABHIV1\"   Hepatitis C Antibody No results found for: \"HEPCAB\"   Rapid Urin Drug Screen No results found for: \"AMPMETHU\", \"BARBITSCNUR\", \"LABBENZSCN\", \"LABMETHSCN\", \"LABOPIASCN\", \"THCURSCR\", \"COCAINEUR\", \"AMPHETSCREEN\", \"PROPOXSCN\", \"BUPRENORSCNU\", \"METAMPSCNUR\", \"OXYCODONESCN\", \"TRICYCLICSCN\"   Group B Strep Culture No results found for: \"GBSANTIGEN\"           External Prenatal Results       Pregnancy Outside Results - Transcribed From Office Records - See Scanned Records For Details       Test Value Date " Time    ABO  AB  03/13/25 1754    Rh  Positive  03/13/25 1754    Antibody Screen  Negative  03/13/25 1754       Negative  02/20/25 1808       Negative  01/20/25        Negative  08/12/24 1046    Varicella IgG       Rubella  2.11 index 08/12/24 1046    Hgb  10.3 g/dL 03/13/25 1754       9.6 g/dL 02/20/25 1808       11.1 g/dL 01/20/25        13.1 g/dL 08/12/24 1046    Hct  30.1 % 03/13/25 1754       28.5 % 02/20/25 1808       32.3 % 01/20/25        40.6 % 08/12/24 1046    HgB A1c        1h GTT  91 mg/dL 01/20/25     3h GTT Fasting       3h GTT 1 hour       3h GTT 2 hour       3h GTT 3 hour        Gonorrhea (discrete)  Negative  08/12/24 1100    Chlamydia (discrete)  Negative  08/12/24 1100    RPR  Non Reactive  01/20/25        Non Reactive  08/12/24 1046    Syphils cascade: TP-Ab (FTA)  Non-Reactive  02/20/25 1820    TP-Ab  Non-Reactive  02/20/25 1820    TP-Ab (EIA)       TPPA       HBsAg  Negative  08/12/24 1046    Herpes Simplex Virus PCR       Herpes Simplex VIrus Culture       HIV  Non Reactive  08/12/24 1046    Hep C RNA Quant PCR       Hep C Antibody  Non Reactive  08/12/24 1046    AFP       NIPT       Cystic Fibrosis (Nik)       Cystic Fibroisis        Spinal Muscular atrophy       Fragile X       Group B Strep       GBS Susceptibility to Clindamycin       GBS Susceptibility to Erythromycin       Fetal Fibronectin       Genetic Testing, Maternal Blood                 Drug Screening       Test Value Date Time    Urine Drug Screen       Amphetamine Screen  Negative ng/mL 09/23/24 1022       Negative ng/mL 08/12/24 1100    Barbiturate Screen  Negative ng/mL 09/23/24 1022       Negative ng/mL 08/12/24 1100    Benzodiazepine Screen  Negative ng/mL 09/23/24 1022       Negative ng/mL 08/12/24 1100    Methadone Screen  Negative ng/mL 09/23/24 1022       Negative ng/mL 08/12/24 1100    Phencyclidine Screen  Negative ng/mL 09/23/24 1022       Negative ng/mL 08/12/24 1100    Opiates Screen       THC Screen        Cocaine Screen       Propoxyphene Screen  Negative ng/mL 24 1022       Negative ng/mL 24 1100    Buprenorphine Screen       Methamphetamine Screen       Oxycodone Screen       Tricyclic Antidepressants Screen                 Legend    ^: Historical                              Past OB History:     OB History    Para Term  AB Living   5 2 2 0 2 2   SAB IAB Ectopic Molar Multiple Live Births   2 0 0 0 0 2      # Outcome Date GA Lbr Kyle/2nd Weight Sex Type Anes PTL Lv   5 Current            4 SAB 2023 8w0d             Birth Comments: D&C   3 SAB 2022              Birth Comments: D&C   2 Term 20 38w4d / 00:43 3630 g (8 lb) M Vag-Spont EPI N BOBBY      Name: Kwan      Apgar1: 8  Apgar5: 9   1 Term 17 38w3d / 00:22 3280 g (7 lb 3.7 oz) F Vag-Spont EPI N BOBBY      Name: Rain      Apgar1: 9  Apgar5: 9      Obstetric Comments   FOB #1 : Pregnancy #1-2   2017-Rain   2020 - Gore       Past Medical History: Past Medical History:   Diagnosis Date    Anxiety and depression     stopped med in 2019    Chlamydia       Past Surgical History Past Surgical History:   Procedure Laterality Date    APPENDECTOMY  2005    BREAST AUGMENTATION Bilateral     WISDOM TOOTH EXTRACTION        Family History: Family History   Problem Relation Age of Onset    Hypertension Mother     Hypertension Father     Heart disease Maternal Grandmother     Diabetes Maternal Grandmother     Heart disease Maternal Grandfather     Hypertension Paternal Grandmother     Hypertension Paternal Grandfather     Mental illness Other       Social History:  reports that she has never smoked. She has never used smokeless tobacco.   reports that she does not currently use alcohol.   reports no history of drug use.              Objective    Vital Signs Range for the last 24 hours  Temperature:     Temp Source: Temp src: Oral   BP: BP: (109-126)/(72-83) 124/77   Pulse:  94   Respirations: Resp:  [16] 16   SPO2:     O2  Amount (l/min):     O2 Devices     Weight: Weight:  [80.3 kg (177 lb)] 80.3 kg (177 lb)     Physical Examination: General appearance - alert, uncomfortable with contractions  Chest - Breathing is unlaboured   Heart - Regular rate   Abdomen - soft, nontender, nondistended, gravid uterus with mild contractions   Extremities - pedal edema  +1    Presentation: Cephalic    Cervix: Exam by: Method: sterile vaginal exam performed   Dilation:  3   Effacement: Cervical Effacement: 50   Station:  -2     Fetal Heart Rate Assessment   Method: Fetal HR Assessment Method: external   Beats/min: Fetal HR (beats/min): 135   Baseline: Fetal HR Baseline: normal range   Varibility: Fetal HR Variability: moderate (amplitude range 6 to 25 bpm)   Accels: Fetal HR Accelerations: greater than/equal to 15 bpm, lasting at least 15 seconds   Decels: Fetal HR Decelerations: absent   Tracing Category:       Uterine Assessment   Method: Method: external tocotransducer   Frequency (min): Contraction Frequency (Minutes): 3-4   Ctx Count in 10 min:     Duration:     Intensity: Contraction Intensity: mild by palpation   Intensity by IUPC:     Resting Tone: Uterine Resting Tone: soft by palpation   Resting Tone by IUPC:                   Assessment/Plan  1. Intrauterine pregnancy at 36w4d gestation with variable decelerations present fetal status  2.  Contractions without cervical change or labour   3.  Variable decels present      Plan:  1.  Admit to L&D  2.  No evidence of labour, but is having variable decels   3.  Will discuss with primary OB provider whether to watch overnight and PDC                    scan am versus moving towards delivery    4.  Admit and observ.   If variable persist will proceed with induction.  If not, then   have PDC scan in am.         Raul Omer MD  3/13/2025  19:55 EDT    Electronically signed by Raul Omer MD at 03/13/25 2014       Prior to Admission Medications       Prescriptions Last Dose Informant  Patient Reported? Taking?    acetaminophen (TYLENOL) 500 MG tablet  Self Yes No    Take 1 tablet by mouth Every 6 (Six) Hours As Needed for Mild Pain.    ferrous sulfate 325 (65 FE) MG tablet Past Week Self No Yes    Take 1 tablet by mouth Daily With Breakfast.    FLUoxetine (PROzac) 20 MG capsule 3/13/2025  No Yes    Take 1 capsule by mouth Daily.    ondansetron ODT (ZOFRAN-ODT) 4 MG disintegrating tablet 3/12/2025  No Yes    Take 1 tablet by mouth Every 8 (Eight) Hours As Needed for Nausea or Vomiting.    Prenatal Vit-Fe Fumarate-FA (prenatal vitamin 27-0.8) 27-0.8 MG tablet tablet Past Week  Yes Yes    Take 1 tablet by mouth Daily.    promethazine (PHENERGAN) 12.5 MG tablet More than a month  No No    Take 1 tablet by mouth Every 6 (Six) Hours As Needed for Nausea.          Orders (last 24 hrs)        Start     Ordered    03/15/25 0800  Sitz Bath  3 Times Daily        Comments: PRN    03/14/25 2152    03/15/25 0600  CBC & Differential  Morning Draw        Comments: Postpartum Day 1      03/14/25 2152    03/15/25 0600  Hemoglobin & Hematocrit, Blood  Morning Draw,   Status:  Canceled        Comments: Postpartum Day 1      03/14/25 2152    03/15/25 0600  CBC Auto Differential  PROCEDURE ONCE        Comments: Postpartum Day 1      03/14/25 2202    03/15/25 0000  ibuprofen (ADVIL,MOTRIN) tablet 600 mg  Every 6 Hours Scheduled         03/14/25 2152    03/15/25 0000  bisacodyl (DULCOLAX) suppository 10 mg  Daily PRN         03/14/25 2152 03/14/25 2245  docusate sodium (COLACE) capsule 100 mg  2 Times Daily         03/14/25 2152 03/14/25 2153  Code Status and Medical Interventions: CPR (Attempt to Resuscitate); Full  Continuous         03/14/25 2152 03/14/25 2153  Vital Signs Per Hospital Policy  Per Hospital Policy/Protocol         03/14/25 2152 03/14/25 2153  Notify Physician  Until Discontinued         03/14/25 2152 03/14/25 2153  Up Ad Jhoana  Until Discontinued         03/14/25 2152 03/14/25 2153   Ambulate Patient  Every Shift       03/14/25 2152 03/14/25 2153  Diet: Regular/House; Fluid Consistency: Thin (IDDSI 0)  Diet Effective Now         03/14/25 2152 03/14/25 2153  Advance Diet As Tolerated -Regular  Until Discontinued         03/14/25 2152 03/14/25 2153  Fundal and Lochia Check  Per Order Details        Comments: Every 30 minutes x2, then Every Shift    03/14/25 2152 03/14/25 2153  RN to Assess Rh Status & Place RhIG Evaluation Order if Indicated  Continuous         03/14/25 2152 03/14/25 2153  Bladder Assessment  Per Order Details        Comments: Postpartum 1) Upon Admission to Unit & Every 4 Hours PRN Until Voiding. 2) Out of Bed to Void in 8 Hours.    03/14/25 2152 03/14/25 2153  Straight Cath  Per Order Details        Comments: Postpartum: If Distended & Unable to Void, May Repeat Once.    03/14/25 2152 03/14/25 2153  Indwelling Urinary Catheter  Per Order Details        Comments: Postpartum : After Straight Cathed x2 or if Greater Than 1000mL Residual, Insert Indwelling Urinary Catheter Until Further MD Order.    03/14/25 2152 03/14/25 2153  Apply Ice to Perineum  Per Order Details        Comments: For 20 Minutes Every 2 Hours    03/14/25 2152 03/14/25 2153  Waffle Cushion  Per Order Details        Comments: For Perineal Discomfort    03/14/25 2152 03/14/25 2153  Donut Ring  Per Order Details        Comments: For Perineal Pain    03/14/25 2152 03/14/25 2153  Kpad  Per Order Details        Comments: For Pain    03/14/25 2152 03/14/25 2153  Breast pump to bed  Once         03/14/25 2152 03/14/25 2153  If indicated -- Please administer RH Immunoglobulin based on results of cord blood evaluation and fetal screen lab tests, pharmacy to dispense  Per Order Details        Comments: See Process Instructions For Reference Range Details.    03/14/25 2152 03/14/25 2152  sodium chloride 0.9 % flush 1-10 mL  As Needed         03/14/25 2152 03/14/25 2152  oxytocin  "(PITOCIN) 30 units in 0.9% sodium chloride 500 mL (premix)  Once As Needed         03/14/25 2152 03/14/25 2152  acetaminophen (TYLENOL) tablet 650 mg  Every 6 Hours PRN         03/14/25 2152 03/14/25 2152  HYDROcodone-acetaminophen (NORCO) 5-325 MG per tablet 1 tablet  Every 8 Hours PRN        Placed in \"Or\" Linked Group    03/14/25 2152 03/14/25 2152  HYDROcodone-acetaminophen (NORCO)  MG per tablet 1 tablet  Every 4 Hours PRN        Placed in \"Or\" Linked Group    03/14/25 2152 03/14/25 2152  magnesium hydroxide (MILK OF MAGNESIA) suspension 10 mL  Daily PRN         03/14/25 2152 03/14/25 2152  lanolin topical 1 Application  Every 1 Hour PRN         03/14/25 2152 03/14/25 2152  benzocaine-menthol (DERMOPLAST) 20-0.5 % topical spray  As Needed         03/14/25 2152 03/14/25 2152  witch hazel-glycerin (TUCKS) pad  As Needed         03/14/25 2152 03/14/25 2152  Hydrocortisone (Perianal) (ANUSOL-HC) 2.5 % rectal cream 1 Application  As Needed         03/14/25 2152 03/14/25 2152  benzocaine (AMERICAINE) 20 % rectal ointment 1 Application  As Needed         03/14/25 2152 03/14/25 1930  URINE DRUG SCREEN PLUS BUPRENORPHINE -  Once         03/14/25 1929 03/14/25 1930  Urine Drug Screen - Urine, Clean Catch  PROCEDURE ONCE         03/14/25 1929 03/14/25 1930  Buprenorphine Screen Urine - Urine, Clean Catch  PROCEDURE ONCE         03/14/25 1929 03/14/25 1924  Blood Gas, Venous, Cord  PROCEDURE ONCE         03/14/25 1923 03/14/25 1924  VTE Prophylaxis Not Indicated: Low Risk; Recent Trauma / Surgery (2)  Once         03/14/25 1924 03/14/25 1923  Blood Gas, Arterial, Cord  PROCEDURE ONCE         03/14/25 1922 03/14/25 1913  Nurse may remove epidural catheter after delivery.  Until Discontinued         03/14/25 1912    03/14/25 1913  Transfer to Postpartum When Criteria Met  Until Discontinued         03/14/25 1912    03/14/25 1845  oxytocin (PITOCIN) 30 units in 0.9% sodium " "chloride 500 mL (premix)  Titrated         03/14/25 1754    03/14/25 1300  penicillin G in iso-osmotic dextrose IVPB 3 million units (premix)  Every 4 Hours        Placed in \"Followed by\" Linked Group    03/14/25 0825    03/14/25 1300  ropivacaine (NAROPIN) 0.2 % injection  Continuous         03/14/25 1207    03/14/25 1300  Sod Citrate-Citric Acid (BICITRA) oral solution 30 mL  Once,   Status:  Discontinued         03/14/25 1207    03/14/25 1207  Vital Signs Per Anesthesia Guidelines  Per Order Details        Comments: Every 3 Minutes x20 Minutes Following Epidural Dosing, Then Every 15 Minutes If Stable    03/14/25 1207    03/14/25 1207  Fetal Heart Rate Monitor  Once         03/14/25 1207 03/14/25 1207  Nurse or anesthesiologist to remain with patient for 15 minutes following dosing.  Until Discontinued         03/14/25 1207    03/14/25 1207  Facilitate maternal postion on side and maintain uterine displacement.  Until Discontinued         03/14/25 1207    03/14/25 1207  Consult anesthesia services prior to changing epidural infusion/rate.  Until Discontinued         03/14/25 1207    03/14/25 1207  Notify physician for the following conditions:  Until Discontinued         03/14/25 1207    03/14/25 1206  lactated ringers bolus 1,000 mL  As Needed,   Status:  Discontinued         03/14/25 1207    03/14/25 1206  ePHEDrine Sulfate (Pressors) 5 MG/ML injection 10 mg  Every 10 Minutes PRN,   Status:  Discontinued         03/14/25 1207    03/14/25 1206  metoclopramide (REGLAN) injection 10 mg  Once As Needed,   Status:  Discontinued         03/14/25 1207    03/14/25 1206  ondansetron (ZOFRAN) injection 4 mg  Once As Needed,   Status:  Discontinued         03/14/25 1207    03/14/25 1206  famotidine (PEPCID) injection 20 mg  Once As Needed,   Status:  Discontinued         03/14/25 1207    03/14/25 1206  diphenhydrAMINE (BENADRYL) injection 12.5 mg  Every 8 Hours PRN,   Status:  Discontinued         03/14/25 1207    " "03/14/25 1200  Vital Signs q 4 while awake  Every 4 Hours      Comments: While the patient is awake.    03/14/25 0825    03/14/25 0930  oxytocin (PITOCIN) 30 units in 0.9% sodium chloride 500 mL (premix)  Continuous        Placed in \"Followed by\" Linked Group    03/14/25 0825    03/14/25 0915  lactated ringers infusion  Continuous         03/14/25 0825    03/14/25 0915  mineral oil liquid 30 mL  Once         03/14/25 0825    03/14/25 0915  oxytocin (PITOCIN) 30 units in 0.9% sodium chloride 500 mL (premix)  Once        Placed in \"Followed by\" Linked Group    03/14/25 0825 03/14/25 0915  oxytocin (PITOCIN) 30 units in 0.9% sodium chloride 500 mL (premix)  Titrated,   Status:  Discontinued         03/14/25 0825 03/14/25 0900  FLUoxetine (PROzac) capsule 20 mg  Daily,   Status:  Discontinued         03/13/25 2059 03/14/25 0900  penicillin G potassium 5 Million Units in sodium chloride 0.9 % 100 mL MBP  Once        Placed in \"Followed by\" Linked Group    03/14/25 0825 03/14/25 0826  Weigh Patient Daily  Daily       03/14/25 0825 03/14/25 0826  VTE Prophylaxis Not Indicated: Low Risk; Reduced Mobility (3)  Once         03/14/25 0825    03/14/25 0824  butorphanol (STADOL) injection 2 mg  Every 2 Hours PRN         03/14/25 0825    03/14/25 0822  Admit To Obstetrics Inpatient  Once         03/14/25 0825    03/14/25 0822  Code Status and Medical Interventions: CPR (Attempt to Resuscitate); Full Support  Continuous,   Status:  Canceled         03/14/25 0825    03/14/25 0822  Obtain Informed Consent  Once         03/14/25 0825    03/14/25 0822  Mini-Prep Prior to Delivery  Once         03/14/25 0825 03/14/25 0822  Continuous Fetal Monitoring With NST on Admission and Prior to Initiation of Oxytocin.  Per Order Details        Comments: Continuous Fetal Monitoring With NST on Admission & Prior to Initiation of Oxytocin.    03/14/25 0825 03/14/25 0822  External Uterine Contraction Monitoring  Per Hospital " Policy/Protocol         25  Notify Provider (Specified)  Continuous        Comments: Open Order Report to View Parameters Requiring Provider Notification    25  Notify Provider of Tachysystole (Per Hospital Algorithm)  Continuous        Comments: Open Order Report to View Parameters Requiring Provider Notification    25  Notify Provider if Membranes Ruptured, Bleeding Greater Than 1 Pad Per Hour, Fetal Heart Tone Abnormality or Severe Pain  Continuous        Comments: Open Order Report to View Parameters Requiring Provider Notification    25  Initiate Group Beta Strep (GBS) Prophylaxis Protocol, If Criteria Met  Continuous        Comments: NO TREATMENT RECOMMENDED IF: 1) Maternal GBS Status Known Negative 2) Scheduled  Birth With Intact Membranes, Not in Labor 3) Maternal GBS Status Unknown, No Risk Factors  TREAT WITH ANTIBIOTICS IF:  1) Maternal GBS Status Known Positive 2) Maternal GBS Status Unknown With Risk Factors: a)  Previous Infant Affected By GBS Infection b) GBS Urinary Tract Infection (UTI) or Bacteriuria During Pregnancy c) Unexplained Maternal Fever (100.4F (38C) or Greater) During Labor d)  Prolonged Rupture of Membranes (18 or More Hours) e) Gestational Age Less Than 37 Weeks    25  NPO Diet NPO Type: Ice Chips  Diet Effective Now,   Status:  Canceled         25  Notify Provider (Specified)  Continuous        Comments: Open Order Report to View Parameters Requiring Provider Notification    25  Vital Signs Per Hospital Policy  Per Hospital Policy/Protocol         25  Fundal & Lochia Check  Per Order Details,   Status:  Canceled        Comments: Every 15 Minutes x4, Then Every 30 Minutes x2, Then Every Shift    25  Fundal & Lochia Check  Every  Shift       25 0825    25 0822  Diet: Regular/House; Fluid Consistency: Thin (IDDSI 0)  Diet Effective Now,   Status:  Canceled         25 0825    25 0822  VTE Prophylaxis Not Indicated: Low Risk; Reduced Mobility (3)  Once         25 0825    25 0821  lactated ringers bolus 1,000 mL  Once As Needed         25 0825    25 0821  acetaminophen (TYLENOL) tablet 650 mg  Every 4 Hours PRN         25 0825    25 0821  methylergonovine (METHERGINE) injection 200 mcg  Once As Needed         25 0825    25 0821  carboprost (HEMABATE) injection 250 mcg  Every 15 Minutes PRN         25 0825    25 0821  miSOPROStol (CYTOTEC) tablet 800 mcg  Once As Needed         25 0825    25 0800  [facility]  1 Time Imaging         2525  Diet: Regular/House; Texture: Regular (IDDSI 7); Fluid Consistency: Thin (IDDSI 0)  Diet Effective Now,   Status:  Canceled         25 0624    25 0002  ondansetron (ZOFRAN) injection 4 mg  Every 6 Hours PRN         25 0003    25  butorphanol (STADOL) injection 2 mg  Every 2 Hours PRN,   Status:  Discontinued         25  butorphanol (STADOL) injection 1 mg  Every 2 Hours PRN         25 214  sodium chloride 0.9 % flush 10 mL  Every 12 Hours Scheduled,   Status:  Discontinued         25  lactated ringers infusion  Continuous         25 2100  acetaminophen (TYLENOL) tablet 650 mg  Every 6 Hours PRN,   Status:  Discontinued         25  sodium chloride 0.9 % flush 10 mL  As Needed,   Status:  Discontinued         25  sodium chloride 0.9 % infusion 40 mL  As Needed,   Status:  Discontinued         25  lidocaine PF 1% (XYLOCAINE) injection 0.5 mL  Once As Needed,    Status:  Discontinued         25    Unscheduled  Position Change - For Intra-Uterine Resusitation for Hypertonus, HyperStimulation or Non-Reassuring Fetal Status  As Needed       25 0825    Unscheduled  Start IV with #16 or #18 gauge angiocath.  As Needed       25 1207    Unscheduled  Warm compress  As Needed       25    Unscheduled  Apply ace wrap, tight bra, or binder  As Needed       25 215    Unscheduled  Apply ice packs  As Needed       25 215    --  acetaminophen (TYLENOL) 500 MG tablet  Every 6 Hours PRN         25 1630                     Operative/Procedure Notes (last 24 hours)        Maryana Díaz MD at 25 1924            3/14/2025    Patient:Glen Bey    MR#:[id number]    Vaginal Delivery Note  30 y.o. yo female  at 36w5d      Variable fetal heart rate decelerations, antepartum    Third trimester pregnancy       Delivery     Delivery: Vaginal, Spontaneous    YOB: 2024   Time of Birth: 7:10 PM     Anesthesia: Epidural    Delivering clinician: Maryana Díaz   Forceps?   No   Vacuum? No    Shoulder dystocia present: No          Infant    Findings: female infant     Infant observations: Weight: No birth weight on file.    Observations/Comments:        Apgars:   @ 1 minute /      @ 5 minutes         Placenta, Cord, and Fluid    Placenta delivered  Spontaneous at  3/14/2025  7:14 PM    Cord:   present.   Nuchal Cord?  yes; Number of nuchal loops present:      Cord blood obtained:     Cord gases obtained:      Cord gas results: Pending         Repair    Episiotomy: No   Lacerations: No   Estimated Blood Loss:   300 mls.         Complications  Placental abruption    Disposition  Mother to Mother Baby/Postpartum  in stable condition currently.  Baby to remains with mom  in stable condition currently.    Description of Delivery  Patient pushed well x 3 contractions and delivered easily from OA  presentation once manually rotated.  There was bright red bloody fluid at delivery.  There was a nuchal x 1, reduced at perineum.  Shoulders delivered easily. Nose and mouth was bulb suctioned and baby was placed on mother's abdomen.  Cord was milked, clamped and cut.   Placenta delivered easily and with pitocin bolus, there was no uterine atony.   The placenta was inspected and at least 20% of it was dusky, consistent with abruption.  UDS ordered b/c of it.      Maryana Díaz MD  25  19:24 EDT            Electronically signed by Maryana Díaz MD at 25 1931          Physician Progress Notes (last 24 hours)        Maryana Díaz MD at 25 1325          Patient comfortable with epidural    FHT currently category 1  Ctx's not tracing well    3/60/-2, AROM, clear fluid  IUPC placed    -expect       Maryana Díaz MD  25      Electronically signed by Maryana Díaz MD at 25 1515       Karena Olivas MD at 25 0823          EFM reviewed overnight, and there are multiple late decels. She cont to have contractions. Will proceed with augmentation of labor given non reassuring fetal status. Gbs unknown, plan PCN. Pt in agreement of plan.     Electronically signed by Karena Olivas MD at 25 0859

## 2025-03-15 NOTE — LACTATION NOTE
"   03/15/25 1210   Maternal Information   Date of Referral 03/15/25   Person Making Referral lactation consultant;patient  (ready to get pump education)   Maternal Reason for Referral other (see comments)  (3rd time mother; stated she formula fed 1st child, 2nd child nursed \"a bit\"; current infant, mother stated infant nursed and did well with prior latching, but falls asleep)   Infant Reason for Referral 35-37 weeks gestation   Maternal Assessment   Breast Size Issue none   Breast Shape Bilateral:;round   Breast Density Bilateral:;soft   Nipples Bilateral:;everted   Left Nipple Symptoms intact;nontender   Right Nipple Symptoms intact;nontender   Maternal Infant Feeding   Maternal Emotional State relaxed;receptive   Infant Positioning   (mother stated that infant already nursed; offered to assist with latching and see if infant wanted to nurse while LC in room; kindly declined; mother stated she would like to pump)   Pain with Feeding no  (per mother report with prior latching)   Latch Assistance other (see comments)  (mother stated infant had already nursed and wanted to learn how to use hospital pump; kindly declined when LC in room; encouraged to call if needed latch assistance later today; photography on their way as well)   Support Person Involvement other (see comments)  (not present)   Milk Expression/Equipment   Breast Pump Type double electric, personal;double electric, hospital grade  (has Christin pump; provided hospital pump)   Breast Pump Flange Type hard   Breast Pump Flange Size other (see comments)  (18mm flange size; encouraged to look into a flange insert kit)   Equipment for Home Use pump not needed at this time  (has personal Christin)   Breast Pumping   Breast Pumping Interventions early pumping promoted;post-feed pumping encouraged  (encouraged pumping after infant feeding sessions due to infant gestational age and to assist with stimulation/encouragement of milk production; if supplementation used " as well)   Breast Pumping   (provided instructions on how to pump, how to wash pump parts, and how to collect and syringe feed infant any colostrum collected)   Lactation Referrals   Lactation Referrals outpatient lactation program   Outpatient Lactation Program Lactation Follow-up Date/Time prn after discharge     Courtesy follow-up visit at mother's request; mother ready to be educated on hospital pump.  Mother stated infant had already nursed and kindly declined latch assistance at the time; wanted to learn how to pump.  LC set up hospital pump, provided 18mm flange size; encouraged mother that she may need to look into a flange insert kit for her pump at home; mother verbalized understanding.    Set up pump hospital pump, showed how to use, how to collect any colostrum expressed, how to syringe feed infant, how to wash pump parts, and encouraged to pump after nursing infant to assist with stimulation/encouragement of milk production; mother verbalized understanding.  Mother had no questions.  Encouraged to call lactation if needed assistance with latching infant.  Encouraged to nurse every three hours along with feeding cues seen and to call lactation PRN.

## 2025-03-15 NOTE — LACTATION NOTE
03/15/25 1002   Maternal Information   Date of Referral 03/15/25   Person Making Referral lactation consultant  (provided hospital pump; visitors/siblings in room; mother would call when ready for pump education.)

## 2025-03-16 VITALS
OXYGEN SATURATION: 98 % | BODY MASS INDEX: 29.49 KG/M2 | TEMPERATURE: 98 F | SYSTOLIC BLOOD PRESSURE: 114 MMHG | HEART RATE: 77 BPM | WEIGHT: 177 LBS | RESPIRATION RATE: 18 BRPM | HEIGHT: 65 IN | DIASTOLIC BLOOD PRESSURE: 62 MMHG

## 2025-03-16 PROCEDURE — 0503F POSTPARTUM CARE VISIT: CPT

## 2025-03-16 RX ORDER — IBUPROFEN 600 MG/1
600 TABLET, FILM COATED ORAL EVERY 6 HOURS SCHEDULED
Qty: 30 TABLET | Refills: 0 | Status: SHIPPED | OUTPATIENT
Start: 2025-03-16

## 2025-03-16 RX ADMIN — IBUPROFEN 600 MG: 600 TABLET ORAL at 05:53

## 2025-03-16 RX ADMIN — FERROUS SULFATE TAB 325 MG (65 MG ELEMENTAL FE) 325 MG: 325 (65 FE) TAB at 08:52

## 2025-03-16 RX ADMIN — IBUPROFEN 600 MG: 600 TABLET ORAL at 00:23

## 2025-03-16 RX ADMIN — DOCUSATE SODIUM 100 MG: 100 CAPSULE, LIQUID FILLED ORAL at 08:52

## 2025-03-16 NOTE — DISCHARGE SUMMARY
Discharge Summary    Date of Admission: 3/13/2025  Date of Discharge:  3/16/2025      Patient: Glen Bey      MR#:[id number]    Delivery Provider: Maryana Díaz    Discharge Surgeon/OB: Rolo/    Presenting Problem/History of Present Illness  Variable fetal heart rate decelerations, antepartum [O36.8390]  Third trimester pregnancy [Z34.93]       Variable fetal heart rate decelerations, antepartum    Third trimester pregnancy         Discharge Diagnosis: Vaginal delivery at 36w5d    Procedures:  Vaginal, Spontaneous    3/14/2025   7:10 PM       Discharge Date: 3/16/2025;     Hospital Course  Patient is a 30 y.o. female  at 36w5d status post vaginal delivery without complication. Postpartum the patient did well. She remained afebrile, with vital signs stable. She reported lochia as mild and pain managed. She was ready for discharge on postpartum day 2.     Infant:   female fetus 2900 g (6 lb 6.3 oz) with Apgar scores of 8 , 9  at five minutes.    Condition on Discharge:  Stable    Vital Signs  Temp:  [98 °F (36.7 °C)-98.5 °F (36.9 °C)] 98 °F (36.7 °C)  Heart Rate:  [69-88] 77  Resp:  [16-18] 18  BP: (114-120)/(62-65) 114/62    Lab Results   Component Value Date    WBC 12.47 (H) 03/15/2025    HGB 8.9 (L) 03/15/2025    HCT 26.8 (L) 03/15/2025    MCV 86.2 03/15/2025     03/15/2025       Discharge Disposition  Home or Self Care    Discharge Medications     Discharge Medications        New Medications        Instructions Start Date   ibuprofen 600 MG tablet  Commonly known as: ADVIL,MOTRIN   600 mg, Oral, Every 6 Hours Scheduled             Continue These Medications        Instructions Start Date   prenatal vitamin 27-0.8 27-0.8 MG tablet tablet   1 tablet, Daily             Stop These Medications      acetaminophen 500 MG tablet  Commonly known as: TYLENOL     ferrous sulfate 325 (65 FE) MG tablet     FLUoxetine 20 MG capsule  Commonly known as: PROzac     ondansetron ODT 4 MG  disintegrating tablet  Commonly known as: ZOFRAN-ODT     promethazine 12.5 MG tablet  Commonly known as: PHENERGAN              Activity at Discharge:   Activity Instructions       Pelvic Rest      No Sex, Tampons, Swimming, Tub Baths x 6 weeks            Follow-up Appointments  Future Appointments   Date Time Provider Department Center   3/17/2025  8:50 AM Tristian Liu MD [facility]   3/31/2025  9:30 PM [facility] LD INDUCTION ROOM 1 [facility]     Additional Instructions for the Follow-ups that You Need to Schedule       Call MD With Problems / Concerns   As directed      Instructions: Call for symptoms related to depression, foul smelling vaginal discharge, fever >/=100.4F, blood clots bigger than a golf ball, saturating through >/= 1 pad per hour.    Order Comments: Instructions: Call for symptoms related to depression, foul smelling vaginal discharge, fever >/=100.4F, blood clots bigger than a golf ball, saturating through >/= 1 pad per hour.         Discharge Follow-up with Specified Provider: ; 6 Weeks   As directed      To:    Follow Up: 6 Weeks                Stephanie Bajwa, NARGIS  03/16/25  10:56 EDT  Csd

## 2025-03-17 ENCOUNTER — MATERNAL SCREENING (OUTPATIENT)
Dept: CALL CENTER | Facility: HOSPITAL | Age: 30
End: 2025-03-17
Payer: COMMERCIAL

## 2025-03-17 NOTE — OUTREACH NOTE
Maternal Screening Survey      Flowsheet Row Responses   Eligibility Eligible   Prep survey completed? Yes   Facility patient discharged from? [facility] - Registered Nurse

## 2025-03-19 ENCOUNTER — TELEPHONE (OUTPATIENT)
Dept: OBSTETRICS AND GYNECOLOGY | Facility: CLINIC | Age: 30
End: 2025-03-19
Payer: COMMERCIAL

## 2025-03-19 NOTE — TELEPHONE ENCOUNTER
Caller: Glen Bey    Relationship to patient: Self    Best call back number: [phone]    Chief complaint: 6 WK PP    Type of visit: PP    Requested date: 6 WEEKS FROM 3/14/25     Additional notes:ANAS SUSI//HUB WASN'T ABLE TO REACH THE OFFICE//PLEASE FOLLOW UP

## 2025-03-25 ENCOUNTER — MATERNAL SCREENING (OUTPATIENT)
Dept: CALL CENTER | Facility: HOSPITAL | Age: 30
End: 2025-03-25
Payer: COMMERCIAL

## 2025-03-25 NOTE — OUTREACH NOTE
Maternal Screening Survey      Flowsheet Row Responses   Facility patient discharged from? [place]   Attempt successful? No   Unsuccessful attempts Attempt 2              Robert DALE - Registered Nurse

## 2025-03-25 NOTE — OUTREACH NOTE
Maternal Screening Survey      Flowsheet Row Responses   Facility patient discharged from? [place]   Attempt successful? No   Unsuccessful attempts Attempt 1              Robert DALE - Registered Nurse

## 2025-03-26 ENCOUNTER — MATERNAL SCREENING (OUTPATIENT)
Dept: CALL CENTER | Facility: HOSPITAL | Age: 30
End: 2025-03-26
Payer: COMMERCIAL

## 2025-03-26 NOTE — OUTREACH NOTE
Maternal Screening Survey      Flowsheet Row Responses   Facility patient discharged from? [place]   Attempt successful? No   Unsuccessful attempts Attempt 3  [UTRx3]   Revoke Decline to participate   Revoke Decline to participate              SHAHRZAD CARRANZA - Registered Nurse

## 2025-04-28 ENCOUNTER — POSTPARTUM VISIT (OUTPATIENT)
Dept: OBSTETRICS AND GYNECOLOGY | Facility: CLINIC | Age: 30
End: 2025-04-28
Payer: COMMERCIAL

## 2025-04-28 VITALS — HEIGHT: 65 IN | BODY MASS INDEX: 25.99 KG/M2 | WEIGHT: 156 LBS

## 2025-04-28 DIAGNOSIS — Z3A.36 36 WEEKS GESTATION OF PREGNANCY: Primary | ICD-10-CM

## 2025-04-28 DIAGNOSIS — Z30.430 ENCOUNTER FOR IUD INSERTION: ICD-10-CM

## 2025-04-28 DIAGNOSIS — Z01.419 PAP TEST, AS PART OF ROUTINE GYNECOLOGICAL EXAMINATION: ICD-10-CM

## 2025-04-28 DIAGNOSIS — F41.8 DEPRESSION WITH ANXIETY: ICD-10-CM

## 2025-04-28 PROCEDURE — 99213 OFFICE O/P EST LOW 20 MIN: CPT | Performed by: OBSTETRICS & GYNECOLOGY

## 2025-04-28 PROCEDURE — 58300 INSERT INTRAUTERINE DEVICE: CPT | Performed by: OBSTETRICS & GYNECOLOGY

## 2025-04-28 NOTE — PROGRESS NOTES
"    OB FOLLOW UP  CC- Here for care of pregnancy        Glen Bey is a 30 y.o.  36w5d patient being seen today for her obstetrical follow up visit. Patient reports {pregnancy complaints lexob:30598}.     Her prenatal care is complicated by (and status) : see below.  Patient Active Problem List   Diagnosis    Depression with anxiety    Attention deficit hyperactivity disorder (ADHD), predominantly inattentive type    Pelvic pain during pregnancy in second trimester, antepartum     labor in third trimester without delivery     uterine contractions, antepartum, third trimester    Variable fetal heart rate decelerations, antepartum    Third trimester pregnancy       GBS Status: was already done and is negative.    No Known Allergies       Flu Status: Declines  RSV status: declines.   Her Delivery Plan is: Desires IOL at 39wks. {scheduled:35652}    US today: {YES:95002}  Non Stress Test: {NonStressTest Yes or No:70897}    ROS -   Patient Denies: {OBSymptoms:24622}  Fetal Movement : {desc; normal/decreased:88937}  Other than what is documented in the HPI, all other systems reviewed and are negative.       The additional following portions of the patient's history were reviewed and updated as appropriate: allergies and current medications.    I have reviewed and agree with the HPI, ROS, and historical information as entered above. ***      EXAM:                                    Assessment and Plan    Problem List Items Addressed This Visit    None      Pregnancy at 36w5d  Fetal status reassuring.   {pregnancy plan 36-40 wks:16971::\"Reviewed Pre-eclampsia signs/symptoms\"}  Delivery options reviewed with patient  Signs of labor reviewed  Kick counts reviewed  Activity and Exercise discussed.  No follow-ups on file.    Grecia Small MA  2025   "

## 2025-04-28 NOTE — PROGRESS NOTES
"     Gynecologic Procedure Note        Procedure: IUD Insertion     Procedures    Pre procedure indication 1) Desires Mirena  Post procedure indication 1) Desires Mirena    NDC: Mirena 35463-162-70  Lot #: [id number]  Exp Date: 05/2027  BH device    Ht 165.1 cm (65\")   Wt 70.8 kg (156 lb)   LMP 06/04/2023   BMI 25.96 kg/m²       The risks, benefits, and alternatives to Mirena were explained at length with the patient. All her questions were answered and consents were signed.  Her LMP was just some spotting .  Urine Pregnancy Test was Not done patient states no intercourse since delivery.  Patient does not have an allergy to betadine or shellfish.    Time out: immediate members of the procedure team and patient agree to the following: correct patient, correct site, correct procedure to be performed. Tristian Liu MD      The patient was placed in a dorsal lithotomy position on the examining table in [facility]. A bimanual exam confirmed the uterus was midposition. A speculum was inserted into the vagina and the cervix was brought into view.  The cervix was prepped with Betadine. The anterior lip of the cervix was then grasped with a single-tooth tenaculum. The endometrial cavity was then sounded to 8 centimeters. The sealed Mirena package was opened and the IUD was removed in a sterile fashion.    The upper edge of the depth setting the flange was set at the uterine sound measurement. The  was then carefully advanced to the cervical canal into the uterus to the level of the fundus.  The slider was then retracted about 1 cm and deployed the device. The device was then gently advanced to the fundus. The IUD was then released by pulling the slider down all the way. The  was removed carefully from the uterus. The threads were then cut leaving 2-3 cm visible outside of the cervix.  The single-tooth tenaculum was removed from the anterior lip. Good hemostasis was noted.   All other instruments were " removed from the vagina.       The patient tolerated the procedure very well with a mild amount of discomfort.  She was monitored for 10 minutes prior to discharge.      There were no complications.    The patient was counseled about the need to return in 4 weeks for IUD check.     She was counseled about the need to use a backup method of contraception such as condoms until her post insertion exam was performed. The patient verbalized understanding when the IUD will need to be removed/replaced. Written information was given to the patient.  The patient is counseled to contact us if she has any significant or increasing bleeding, pain, fever, chills, or other concerns. She is instructed to see a doctor right away if she believes that she may be pregnant at any time with the IUD in place.    Return in about 4 weeks (around 5/26/2025) for IUD string check.      Tristian Liu MD  04/28/2025

## 2025-04-28 NOTE — PROGRESS NOTES
Chief Complaint   Patient presents with    Postpartum Care       Postpartum Visit         Glen Bey is a 30 y.o.  who presents today for a 6 week(s) postpartum check.     C/S: no     Vaginal, Spontaneous   Information for the patient's :  Kari Álvarez [[id number]]   3/14/2025   female   Kari Álvarez   2900 g (6 lb 6.3 oz)   Gestational Age: 36w5d       Baby Discharged: Discharged with Mom  Delivering Physician: Maryana Díaz MD    Her pregnancy was complicated by no known issues. The patient did not have a laceration or episiotomy  is healing well. Patient describes vaginal bleeding as absent.  Patient is bottle feeding.  She desires  undecided possible tubal  for contraception.  Discussed options and will proceed with Mirena IUD as she is uncertain about a permanent solution.     She would like to discuss the following complaints today: Depression    Patient denies concerns for postpartum depression/anxiety. Patient denies  suicidal or homicidal ideation. Her postpartum depression screening questionnaire: 11. Fells its helping some but may need to raise it      Last Pap : 2022. Results: negative. HPV: negative.   Last Completed Pap Smear            Awaiting Completion       PAP SMEAR (Every 3 Years) Order placed this encounter      2025  Order placed for LIQUID-BASED PAP SMEAR WITH HPV GENOTYPING REGARDLESS OF INTERPRETATION (NAYELI,MELLO,REINIER) by Grecia Small MA    2022  LIQUID-BASED PAP SMEAR, [facility] (NAYELI,COR,REINIER)    2017  Pap IG, Rfx HPV ASCU                              The additional following portions of the patient's history were reviewed and updated as appropriate: allergies, current medications, past family history, past medical history, past social history, past surgical history, and problem list.    Review of Systems   Constitutional: Negative.    Respiratory: Negative.     Cardiovascular: Negative.    Gastrointestinal: Negative.   "  Genitourinary: Negative.    Musculoskeletal: Negative.    Neurological: Negative.    Psychiatric/Behavioral:  Positive for sleep disturbance, depressed mood and stress. Negative for self-injury and suicidal ideas. The patient is nervous/anxious.      All other systems reviewed and are negative.     I have reviewed and agree with the HPI, ROS, and historical information as entered above. Tristian Liu MD      Ht 165.1 cm (65\")   Wt 70.8 kg (156 lb)   LMP 06/04/2023   BMI 25.96 kg/m²     Physical Exam  Vitals reviewed. Exam conducted with a chaperone present.   Constitutional:       Appearance: Normal appearance. She is normal weight.   HENT:      Head: Normocephalic and atraumatic.   Abdominal:      General: Abdomen is flat.      Palpations: Abdomen is soft.   Genitourinary:     General: Normal vulva.      Vagina: Normal.      Cervix: Normal.      Uterus: Normal.       Adnexa: Right adnexa normal and left adnexa normal.      Comments: See IUD insertion note  Skin:     General: Skin is warm and dry.   Neurological:      Mental Status: She is alert and oriented to person, place, and time.   Psychiatric:         Mood and Affect: Mood normal.         Behavior: Behavior normal.             Assessment and Plan    Problem List Items Addressed This Visit       Depression with anxiety    Relevant Medications    FLUoxetine (PROzac) 20 MG capsule    FLUoxetine (PROzac) 20 MG capsule     Other Visit Diagnoses         36 weeks gestation of pregnancy    -  Primary    Relevant Orders    POC Urinalysis Dipstick      Pap test, as part of routine gynecological examination        Relevant Orders    LIQUID-BASED PAP SMEAR WITH HPV GENOTYPING REGARDLESS OF INTERPRETATION (NAYELI,COR,MAD)      Encounter for IUD insertion        Relevant Medications    Levonorgestrel (MIRENA) 20 MCG/DAY IUD intrauterine device 1 each (Completed) (Start on 4/28/2025 11:00 AM)            S/p Vaginal delivery, 6 week(s) postpartum.  Doing well.  "   Return to normal physical activity.  No pelvic restrictions.   Baby doing well.  Breastfeeding going well.  Signs of postpartum depression - discussed options.  Encouraged consideration of counseling and encouraged to consider SSRI.  Contraception: contraceptive methods: IUD.  Insertion date: Mirena today  Will  increae prozac to 20mg BID.   Return in about 4 weeks (around 5/26/2025) for IUD string check.     Tristian Liu MD  04/28/2025

## 2025-04-29 LAB — REF LAB TEST METHOD: NORMAL
